# Patient Record
Sex: MALE | Race: AMERICAN INDIAN OR ALASKA NATIVE | NOT HISPANIC OR LATINO | URBAN - METROPOLITAN AREA
[De-identification: names, ages, dates, MRNs, and addresses within clinical notes are randomized per-mention and may not be internally consistent; named-entity substitution may affect disease eponyms.]

---

## 2017-11-29 ENCOUNTER — OUTPATIENT (OUTPATIENT)
Dept: OUTPATIENT SERVICES | Facility: HOSPITAL | Age: 47
LOS: 1 days | Discharge: HOME | End: 2017-11-29

## 2017-11-29 DIAGNOSIS — B20 HUMAN IMMUNODEFICIENCY VIRUS [HIV] DISEASE: ICD-10-CM

## 2018-02-28 ENCOUNTER — OUTPATIENT (OUTPATIENT)
Dept: OUTPATIENT SERVICES | Facility: HOSPITAL | Age: 48
LOS: 1 days | Discharge: HOME | End: 2018-02-28

## 2018-02-28 DIAGNOSIS — I10 ESSENTIAL (PRIMARY) HYPERTENSION: ICD-10-CM

## 2018-02-28 DIAGNOSIS — E11.9 TYPE 2 DIABETES MELLITUS WITHOUT COMPLICATIONS: ICD-10-CM

## 2018-02-28 DIAGNOSIS — Z20.6 CONTACT WITH AND (SUSPECTED) EXPOSURE TO HUMAN IMMUNODEFICIENCY VIRUS [HIV]: ICD-10-CM

## 2018-02-28 DIAGNOSIS — D50.9 IRON DEFICIENCY ANEMIA, UNSPECIFIED: ICD-10-CM

## 2018-09-19 ENCOUNTER — OUTPATIENT (OUTPATIENT)
Dept: OUTPATIENT SERVICES | Facility: HOSPITAL | Age: 48
LOS: 1 days | Discharge: HOME | End: 2018-09-19

## 2018-09-19 DIAGNOSIS — B20 HUMAN IMMUNODEFICIENCY VIRUS [HIV] DISEASE: ICD-10-CM

## 2018-10-31 ENCOUNTER — OUTPATIENT (OUTPATIENT)
Dept: OUTPATIENT SERVICES | Facility: HOSPITAL | Age: 48
LOS: 1 days | Discharge: HOME | End: 2018-10-31

## 2018-11-01 DIAGNOSIS — B20 HUMAN IMMUNODEFICIENCY VIRUS [HIV] DISEASE: ICD-10-CM

## 2019-01-10 ENCOUNTER — OUTPATIENT (OUTPATIENT)
Dept: OUTPATIENT SERVICES | Facility: HOSPITAL | Age: 49
LOS: 1 days | Discharge: HOME | End: 2019-01-10

## 2019-01-10 DIAGNOSIS — B20 HUMAN IMMUNODEFICIENCY VIRUS [HIV] DISEASE: ICD-10-CM

## 2019-02-27 PROBLEM — Z00.00 ENCOUNTER FOR PREVENTIVE HEALTH EXAMINATION: Status: ACTIVE | Noted: 2019-02-27

## 2019-02-28 ENCOUNTER — OUTPATIENT (OUTPATIENT)
Dept: OUTPATIENT SERVICES | Facility: HOSPITAL | Age: 49
LOS: 1 days | Discharge: HOME | End: 2019-02-28

## 2019-02-28 DIAGNOSIS — B20 HUMAN IMMUNODEFICIENCY VIRUS [HIV] DISEASE: ICD-10-CM

## 2019-03-29 ENCOUNTER — OUTPATIENT (OUTPATIENT)
Dept: OUTPATIENT SERVICES | Facility: HOSPITAL | Age: 49
LOS: 1 days | Discharge: HOME | End: 2019-03-29

## 2019-03-29 ENCOUNTER — APPOINTMENT (OUTPATIENT)
Dept: GASTROENTEROLOGY | Facility: CLINIC | Age: 49
End: 2019-03-29

## 2019-03-29 DIAGNOSIS — Z87.891 PERSONAL HISTORY OF NICOTINE DEPENDENCE: ICD-10-CM

## 2019-03-29 NOTE — ASSESSMENT
[FreeTextEntry1] : 49 y/o M w/ PMhx of HIV on HAART and CD 4 700, T2DM was sent by his cardiologist for chest pain with suspicion of Chronic GERD\par \par #Dysphagia to solids - possibly 2/2 Esophagitis - r/o Malignancy\par - H&H stable at 15\par - No odynophagia\par - will schedule pt for EGD\par \par #Red blood per rectum occasional w/o acute anemia\par - pt is average risk for colonoscopy\par - Pt denies any straining or constipation\par - will scheduled for colonoscopy\par \par #Transamintiis - Hepatocellular possibly medication induced 2/2 HAART, Rosuvastatn vs Janumet\par - AST 34 and ALT 58\par - T. bili and ALP wnl\par - Hepatitis panel: HAV ab positive only\par - will obtain RUQ sono\par - will order CLD workup

## 2019-03-29 NOTE — PHYSICAL EXAM
[General Appearance - Alert] : alert [General Appearance - In No Acute Distress] : in no acute distress [Auscultation Breath Sounds / Voice Sounds] : lungs were clear to auscultation bilaterally [Heart Rate And Rhythm] : heart rate was normal and rhythm regular [Heart Sounds] : normal S1 and S2 [Heart Sounds Gallop] : no gallops [Murmurs] : no murmurs [Heart Sounds Pericardial Friction Rub] : no pericardial rub [Breast Appearance] : normal in appearance [Breast Palpation Mass] : no palpable masses [Bowel Sounds] : normal bowel sounds [Abdomen Soft] : soft [Abdomen Tenderness] : non-tender [Abdomen Mass (___ Cm)] : no abdominal mass palpated [No CVA Tenderness] : no ~M costovertebral angle tenderness [No Spinal Tenderness] : no spinal tenderness [Abnormal Walk] : normal gait [Nail Clubbing] : no clubbing  or cyanosis of the fingernails [Musculoskeletal - Swelling] : no joint swelling seen [Motor Tone] : muscle strength and tone were normal [Skin Color & Pigmentation] : normal skin color and pigmentation [Skin Turgor] : normal skin turgor [] : no rash

## 2019-03-29 NOTE — HISTORY OF PRESENT ILLNESS
[de-identified] : 49 y/o M w/ PMhx of HIV on HAART and CD 4 700, T2DM was sent by his cardiologist for chest pain. Pt states that since october he has been suffering acid reflux like symptoms w/ intermittent dysphagia to solids that is resolved with consumption of liquids. He states his dysphagia has been worsening and gets a feeling of food getting stuck in his chest. 2 weeks ago he had chest pain and went to his cardiologist and underwent EKG, stress test and were unremarkable. Thus it was suspected that pt may be suffering from worsening of GERD and was referred to here. Pt currently [place] any N/V/D, fever, chills, sob, dizziness, confusion or abdominal pain.\par \par Pt also has been experiencing occasional red blood in his stool especially after eating spicy food. This happens once a month and pt does not suffer from constipation.\par \par RF: HIV on HAART, Former 30 pack year smoking hx. No illicit or IV drug use or etoh. NO family hx of stomach or colon cancer\par \par EGD/Colonoscopy: No previous egd/colonoscopy

## 2019-04-05 DIAGNOSIS — K62.5 HEMORRHAGE OF ANUS AND RECTUM: ICD-10-CM

## 2019-04-05 DIAGNOSIS — R13.10 DYSPHAGIA, UNSPECIFIED: ICD-10-CM

## 2019-04-11 LAB
ALBUMIN SERPL ELPH-MCNC: 4.7 G/DL
ALP BLD-CCNC: 53 U/L
ALT SERPL-CCNC: 38 U/L
ANA SER IF-ACNC: NEGATIVE
APTT BLD: 33.4 SEC
AST SERPL-CCNC: 23 U/L
BASOPHILS # BLD AUTO: 0.02 K/UL
BASOPHILS NFR BLD AUTO: 0.4 %
BILIRUB DIRECT SERPL-MCNC: <0.2 MG/DL
BILIRUB INDIRECT SERPL-MCNC: >0.2 MG/DL
BILIRUB SERPL-MCNC: 0.4 MG/DL
CERULOPLASMIN SERPL-MCNC: 19 MG/DL
CMV IGG SERPL QL: 3.4 U/ML
CMV IGG SERPL-IMP: POSITIVE
EBV DNA SERPL NAA+PROBE-ACNC: NOT DETECTED IU/ML
EBVPCR LOG: NOT DETECTED LOGIU/ML
EOSINOPHIL # BLD AUTO: 0.16 K/UL
EOSINOPHIL NFR BLD AUTO: 2.9 %
FERRITIN SERPL-MCNC: 494 NG/ML
HCT VFR BLD CALC: 43.5 %
HGB BLD-MCNC: 14.4 G/DL
IGA SER QL IEP: 181 MG/DL
IMM GRANULOCYTES NFR BLD AUTO: 0.4 %
INR PPP: 1.04 RATIO
IRON SATN MFR SERPL: 31 %
IRON SERPL-MCNC: 93 UG/DL
LKM AB SER QL IF: <20.1 UNITS
LYMPHOCYTES # BLD AUTO: 2.16 K/UL
LYMPHOCYTES NFR BLD AUTO: 39.3 %
MAN DIFF?: NORMAL
MCHC RBC-ENTMCNC: 28.4 PG
MCHC RBC-ENTMCNC: 33.1 G/DL
MCV RBC AUTO: 85.8 FL
MONOCYTES # BLD AUTO: 0.39 K/UL
MONOCYTES NFR BLD AUTO: 7.1 %
NEUTROPHILS # BLD AUTO: 2.74 K/UL
NEUTROPHILS NFR BLD AUTO: 49.9 %
PLATELET # BLD AUTO: 135 K/UL
PROT SERPL-MCNC: 7 G/DL
PT BLD: 12 SEC
RBC # BLD: 5.07 M/UL
RBC # FLD: 13.2 %
SMOOTH MUSCLE AB SER QL IF: NORMAL
TIBC SERPL-MCNC: 297 UG/DL
TRANSFERRIN SERPL-MCNC: 251 MG/DL
TTG IGA SER IA-ACNC: <1.2 U/ML
TTG IGA SER-ACNC: NEGATIVE
TTG IGG SER IA-ACNC: <1.2 U/ML
TTG IGG SER IA-ACNC: NEGATIVE
UIBC SERPL-MCNC: 204 UG/DL
WBC # FLD AUTO: 5.49 K/UL

## 2019-05-03 ENCOUNTER — APPOINTMENT (OUTPATIENT)
Dept: GASTROENTEROLOGY | Facility: CLINIC | Age: 49
End: 2019-05-03

## 2019-05-30 ENCOUNTER — OUTPATIENT (OUTPATIENT)
Dept: OUTPATIENT SERVICES | Facility: HOSPITAL | Age: 49
LOS: 1 days | Discharge: HOME | End: 2019-05-30

## 2019-05-30 DIAGNOSIS — B20 HUMAN IMMUNODEFICIENCY VIRUS [HIV] DISEASE: ICD-10-CM

## 2019-06-21 ENCOUNTER — TRANSCRIPTION ENCOUNTER (OUTPATIENT)
Age: 49
End: 2019-06-21

## 2019-06-21 ENCOUNTER — OUTPATIENT (OUTPATIENT)
Dept: OUTPATIENT SERVICES | Facility: HOSPITAL | Age: 49
LOS: 1 days | Discharge: HOME | End: 2019-06-21
Payer: COMMERCIAL

## 2019-06-21 ENCOUNTER — RESULT REVIEW (OUTPATIENT)
Age: 49
End: 2019-06-21

## 2019-06-21 VITALS
RESPIRATION RATE: 20 BRPM | DIASTOLIC BLOOD PRESSURE: 76 MMHG | SYSTOLIC BLOOD PRESSURE: 114 MMHG | HEIGHT: 68 IN | TEMPERATURE: 98 F | HEART RATE: 74 BPM | WEIGHT: 190.04 LBS

## 2019-06-21 VITALS — HEART RATE: 72 BPM | RESPIRATION RATE: 18 BRPM | SYSTOLIC BLOOD PRESSURE: 124 MMHG | DIASTOLIC BLOOD PRESSURE: 76 MMHG

## 2019-06-21 DIAGNOSIS — Z90.49 ACQUIRED ABSENCE OF OTHER SPECIFIED PARTS OF DIGESTIVE TRACT: Chronic | ICD-10-CM

## 2019-06-21 DIAGNOSIS — I80.219 PHLEBITIS AND THROMBOPHLEBITIS OF UNSPECIFIED ILIAC VEIN: ICD-10-CM

## 2019-06-21 PROCEDURE — 88312 SPECIAL STAINS GROUP 1: CPT | Mod: 26

## 2019-06-21 PROCEDURE — 88305 TISSUE EXAM BY PATHOLOGIST: CPT | Mod: 26

## 2019-06-21 NOTE — CHART NOTE - NSCHARTNOTEFT_GEN_A_CORE
PACU ANESTHESIA ADMISSION NOTE      Procedure:   Post op diagnosis:      ____  Intubated  TV:______       Rate: ______      FiO2: ______    _x___  Patent Airway    _x___  Full return of protective reflexes    _x___  Full recovery from anesthesia / back to baseline status    Vitals:            T:                BP :      139/91          R:   20           Sat:   100            P:69      Mental Status:  _x___ Awake   _____ Alert   _____ Drowsy   _____ Sedated    Nausea/Vomiting:  _x___  NO       ______Yes,   See Post - Op Orders         Pain Scale (0-10):  __0___    Treatment: _x___ None    ____ See Post - Op/PCA Orders    Post - Operative Fluids:   __x__ Oral   ____ See Post - Op Orders    Plan: Discharge:   _x___Home       _____Floor     _____Critical Care    _____  Other:_________________    Comments:  No anesthesia issues or complications noted.  Discharge when criteria met.

## 2019-06-24 LAB
SURGICAL PATHOLOGY STUDY: SIGNIFICANT CHANGE UP
SURGICAL PATHOLOGY STUDY: SIGNIFICANT CHANGE UP

## 2019-06-26 DIAGNOSIS — K52.9 NONINFECTIVE GASTROENTERITIS AND COLITIS, UNSPECIFIED: ICD-10-CM

## 2019-06-26 DIAGNOSIS — K63.5 POLYP OF COLON: ICD-10-CM

## 2019-06-26 DIAGNOSIS — K29.50 UNSPECIFIED CHRONIC GASTRITIS WITHOUT BLEEDING: ICD-10-CM

## 2019-06-26 DIAGNOSIS — K64.8 OTHER HEMORRHOIDS: ICD-10-CM

## 2019-06-26 DIAGNOSIS — K22.70 BARRETT'S ESOPHAGUS WITHOUT DYSPLASIA: ICD-10-CM

## 2019-06-26 DIAGNOSIS — Z12.11 ENCOUNTER FOR SCREENING FOR MALIGNANT NEOPLASM OF COLON: ICD-10-CM

## 2019-06-26 DIAGNOSIS — K57.30 DIVERTICULOSIS OF LARGE INTESTINE WITHOUT PERFORATION OR ABSCESS WITHOUT BLEEDING: ICD-10-CM

## 2019-06-26 DIAGNOSIS — D12.8 BENIGN NEOPLASM OF RECTUM: ICD-10-CM

## 2019-09-01 ENCOUNTER — INPATIENT (INPATIENT)
Facility: HOSPITAL | Age: 49
LOS: 15 days | Discharge: HOME HEALTH | End: 2019-09-17
Attending: THORACIC SURGERY (CARDIOTHORACIC VASCULAR SURGERY) | Admitting: THORACIC SURGERY (CARDIOTHORACIC VASCULAR SURGERY)
Payer: COMMERCIAL

## 2019-09-01 VITALS
HEART RATE: 118 BPM | RESPIRATION RATE: 20 BRPM | WEIGHT: 195.11 LBS | SYSTOLIC BLOOD PRESSURE: 126 MMHG | DIASTOLIC BLOOD PRESSURE: 74 MMHG | OXYGEN SATURATION: 97 % | TEMPERATURE: 103 F

## 2019-09-01 DIAGNOSIS — Z90.49 ACQUIRED ABSENCE OF OTHER SPECIFIED PARTS OF DIGESTIVE TRACT: Chronic | ICD-10-CM

## 2019-09-01 LAB
ALBUMIN SERPL ELPH-MCNC: 4.9 G/DL — SIGNIFICANT CHANGE UP (ref 3.5–5.2)
ALP SERPL-CCNC: 58 U/L — SIGNIFICANT CHANGE UP (ref 30–115)
ALT FLD-CCNC: 26 U/L — SIGNIFICANT CHANGE UP (ref 0–41)
ANION GAP SERPL CALC-SCNC: 15 MMOL/L — HIGH (ref 7–14)
APTT BLD: 31.9 SEC — SIGNIFICANT CHANGE UP (ref 27–39.2)
AST SERPL-CCNC: 33 U/L — SIGNIFICANT CHANGE UP (ref 0–41)
BASOPHILS # BLD AUTO: 0.02 K/UL — SIGNIFICANT CHANGE UP (ref 0–0.2)
BASOPHILS NFR BLD AUTO: 0.2 % — SIGNIFICANT CHANGE UP (ref 0–1)
BILIRUB SERPL-MCNC: 0.9 MG/DL — SIGNIFICANT CHANGE UP (ref 0.2–1.2)
BUN SERPL-MCNC: 12 MG/DL — SIGNIFICANT CHANGE UP (ref 10–20)
CALCIUM SERPL-MCNC: 9.5 MG/DL — SIGNIFICANT CHANGE UP (ref 8.5–10.1)
CHLORIDE SERPL-SCNC: 96 MMOL/L — LOW (ref 98–110)
CO2 SERPL-SCNC: 25 MMOL/L — SIGNIFICANT CHANGE UP (ref 17–32)
CREAT SERPL-MCNC: 1 MG/DL — SIGNIFICANT CHANGE UP (ref 0.7–1.5)
EOSINOPHIL # BLD AUTO: 0.01 K/UL — SIGNIFICANT CHANGE UP (ref 0–0.7)
EOSINOPHIL NFR BLD AUTO: 0.1 % — SIGNIFICANT CHANGE UP (ref 0–8)
GLUCOSE SERPL-MCNC: 159 MG/DL — HIGH (ref 70–99)
HCT VFR BLD CALC: 45.6 % — SIGNIFICANT CHANGE UP (ref 42–52)
HGB BLD-MCNC: 15.5 G/DL — SIGNIFICANT CHANGE UP (ref 14–18)
IMM GRANULOCYTES NFR BLD AUTO: 0.2 % — SIGNIFICANT CHANGE UP (ref 0.1–0.3)
INR BLD: 1.21 RATIO — SIGNIFICANT CHANGE UP (ref 0.65–1.3)
LACTATE SERPL-SCNC: 1.7 MMOL/L — SIGNIFICANT CHANGE UP (ref 0.5–2.2)
LYMPHOCYTES # BLD AUTO: 0.47 K/UL — LOW (ref 1.2–3.4)
LYMPHOCYTES # BLD AUTO: 5.4 % — LOW (ref 20.5–51.1)
MCHC RBC-ENTMCNC: 29.2 PG — SIGNIFICANT CHANGE UP (ref 27–31)
MCHC RBC-ENTMCNC: 34 G/DL — SIGNIFICANT CHANGE UP (ref 32–37)
MCV RBC AUTO: 85.9 FL — SIGNIFICANT CHANGE UP (ref 80–94)
MONOCYTES # BLD AUTO: 0.52 K/UL — SIGNIFICANT CHANGE UP (ref 0.1–0.6)
MONOCYTES NFR BLD AUTO: 6 % — SIGNIFICANT CHANGE UP (ref 1.7–9.3)
NEUTROPHILS # BLD AUTO: 7.63 K/UL — HIGH (ref 1.4–6.5)
NEUTROPHILS NFR BLD AUTO: 88.1 % — HIGH (ref 42.2–75.2)
NRBC # BLD: 0 /100 WBCS — SIGNIFICANT CHANGE UP (ref 0–0)
PLATELET # BLD AUTO: 104 K/UL — LOW (ref 130–400)
POTASSIUM SERPL-MCNC: 4.1 MMOL/L — SIGNIFICANT CHANGE UP (ref 3.5–5)
POTASSIUM SERPL-SCNC: 4.1 MMOL/L — SIGNIFICANT CHANGE UP (ref 3.5–5)
PROT SERPL-MCNC: 8 G/DL — SIGNIFICANT CHANGE UP (ref 6–8)
PROTHROM AB SERPL-ACNC: 13.9 SEC — HIGH (ref 9.95–12.87)
RBC # BLD: 5.31 M/UL — SIGNIFICANT CHANGE UP (ref 4.7–6.1)
RBC # FLD: 12.6 % — SIGNIFICANT CHANGE UP (ref 11.5–14.5)
SODIUM SERPL-SCNC: 136 MMOL/L — SIGNIFICANT CHANGE UP (ref 135–146)
WBC # BLD: 8.67 K/UL — SIGNIFICANT CHANGE UP (ref 4.8–10.8)
WBC # FLD AUTO: 8.67 K/UL — SIGNIFICANT CHANGE UP (ref 4.8–10.8)

## 2019-09-01 PROCEDURE — 70491 CT SOFT TISSUE NECK W/DYE: CPT | Mod: 26

## 2019-09-01 PROCEDURE — 71046 X-RAY EXAM CHEST 2 VIEWS: CPT | Mod: 26

## 2019-09-01 PROCEDURE — 99285 EMERGENCY DEPT VISIT HI MDM: CPT

## 2019-09-01 RX ORDER — SODIUM CHLORIDE 9 MG/ML
2000 INJECTION INTRAMUSCULAR; INTRAVENOUS; SUBCUTANEOUS ONCE
Refills: 0 | Status: COMPLETED | OUTPATIENT
Start: 2019-09-01 | End: 2019-09-01

## 2019-09-01 RX ORDER — SODIUM CHLORIDE 9 MG/ML
700 INJECTION INTRAMUSCULAR; INTRAVENOUS; SUBCUTANEOUS ONCE
Refills: 0 | Status: COMPLETED | OUTPATIENT
Start: 2019-09-01 | End: 2019-09-01

## 2019-09-01 RX ORDER — VANCOMYCIN HCL 1 G
1500 VIAL (EA) INTRAVENOUS ONCE
Refills: 0 | Status: COMPLETED | OUTPATIENT
Start: 2019-09-01 | End: 2019-09-01

## 2019-09-01 RX ORDER — ACETAMINOPHEN 500 MG
650 TABLET ORAL ONCE
Refills: 0 | Status: COMPLETED | OUTPATIENT
Start: 2019-09-01 | End: 2019-09-01

## 2019-09-01 RX ORDER — AMPICILLIN SODIUM AND SULBACTAM SODIUM 250; 125 MG/ML; MG/ML
3 INJECTION, POWDER, FOR SUSPENSION INTRAMUSCULAR; INTRAVENOUS ONCE
Refills: 0 | Status: COMPLETED | OUTPATIENT
Start: 2019-09-01 | End: 2019-09-01

## 2019-09-01 RX ORDER — IBUPROFEN 200 MG
600 TABLET ORAL ONCE
Refills: 0 | Status: COMPLETED | OUTPATIENT
Start: 2019-09-01 | End: 2019-09-01

## 2019-09-01 RX ADMIN — AMPICILLIN SODIUM AND SULBACTAM SODIUM 200 GRAM(S): 250; 125 INJECTION, POWDER, FOR SUSPENSION INTRAMUSCULAR; INTRAVENOUS at 21:17

## 2019-09-01 RX ADMIN — SODIUM CHLORIDE 2000 MILLILITER(S): 9 INJECTION INTRAMUSCULAR; INTRAVENOUS; SUBCUTANEOUS at 19:01

## 2019-09-01 RX ADMIN — Medication 650 MILLIGRAM(S): at 21:17

## 2019-09-01 RX ADMIN — SODIUM CHLORIDE 700 MILLILITER(S): 9 INJECTION INTRAMUSCULAR; INTRAVENOUS; SUBCUTANEOUS at 21:16

## 2019-09-01 RX ADMIN — Medication 600 MILLIGRAM(S): at 21:00

## 2019-09-01 RX ADMIN — Medication 600 MILLIGRAM(S): at 19:01

## 2019-09-01 RX ADMIN — Medication 300 MILLIGRAM(S): at 22:50

## 2019-09-01 RX ADMIN — Medication 650 MILLIGRAM(S): at 23:19

## 2019-09-01 NOTE — CONSULT NOTE ADULT - SUBJECTIVE AND OBJECTIVE BOX
Patient is a 48y old  Male who presents with a chief complaint of lower left side tooth pain    HPI: Patient c/o left tooth pain for 1 week, Has appt for wisdom tooth extraction this week, Fever up to 105 with worsening pain since last night, Took motrin 800mg 10 am and tylenol 500mg 3 pm, No HA, No neck pain,  no cough, no SOB. H/o DM, HIV with 0 viral load and normal CD4/,      PAST MEDICAL & SURGICAL HISTORY:  Diabetes  Hypercholesteremia  HIV (human immunodeficiency virus infection)  History of appendectomy    (  - ) heart valve replacement  (  - ) joint replacement  ( -  ) pregnancy    MEDICATIONS  (STANDING):  vancomycin  IVPB 1500 milliGRAM(s) IV Intermittent once    MEDICATIONS  (PRN):      Allergies    No Known Allergies    Intolerances        FAMILY HISTORY:      *SOCIAL HISTORY: (-   ) Tobacco; (-   ) ETOH    *Last Dental Visit:    Vital Signs Last 24 Hrs  T(C): 37.7 (01 Sep 2019 21:10), Max: 39.6 (01 Sep 2019 18:09)  T(F): 99.9 (01 Sep 2019 21:10), Max: 103.3 (01 Sep 2019 18:09)  HR: 118 (01 Sep 2019 18:09) (118 - 118)  BP: 126/74 (01 Sep 2019 18:09) (126/74 - 126/74)  BP(mean): --  RR: 20 (01 Sep 2019 18:09) (20 - 20)  SpO2: 97% (01 Sep 2019 18:09) (97% - 97%)    LABS:                        15.5   8.67  )-----------( 104      ( 01 Sep 2019 18:50 )             45.6     09-01    136  |  96<L>  |  12  ----------------------------<  159<H>  4.1   |  25  |  1.0    Ca    9.5      01 Sep 2019 18:50    TPro  8.0  /  Alb  4.9  /  TBili  0.9  /  DBili  x   /  AST  33  /  ALT  26  /  AlkPhos  58  09-01    WBC Count: 8.67 K/uL [4.80 - 10.80] (09-01 @ 18:50)  Platelet Count - Automated: 104 K/uL <L> [130 - 400] (09-01 @ 18:50)  INR: 1.21 ratio [0.65 - 1.30] (09-01 @ 18:50)        PT/INR - ( 01 Sep 2019 18:50 )   PT: 13.90 sec;   INR: 1.21 ratio         PTT - ( 01 Sep 2019 18:50 )  PTT:31.9 sec  EOE:  TMJ ( -  ) clicks                     (  - ) pops                     (  - ) crepitus             Mandible <<FROM>>             Facial bones and MOM <<grossly intact>>             ( +  ) trismus (around 20-25mm opening)             ( -  ) lymphadenopathy             (  + ) swelling (mild swelling on the left side of neck)             (  - ) asymmetry             ( +  ) palpation             ( -  ) dyspnea             (  - ) dysphagia             (  - ) loss of consciousness    IOE:  <<permanent> dentition: <<grossly intact>           hard/soft palate:  (  - ) palatal torus, <<No pathology noted>>           tongue/FOM <<No pathology noted>>           labial/buccal mucosa <<No pathology noted>>           ( +  ) percussion           (+   ) palpation           ( -  ) swelling            ( -  ) abscess           (  - ) sinus tract      *DENTAL RADIOGRAPHS: N/A    RADIOLOGY & ADDITIONAL STUDIES:    *ASSESSMENT:    Clinically,    EOE: slight swelling on the left neck, tenderness when palpated  IOE: no signs of swelling. Pain worsening from 19 -> 18 -> 17 when palpated. Gingiva inflamed on the distal of tooth #17    Primary diagnosis: Pericoronitis    *PLAN:    IV antibiotic and peridex oral rinse.    Extraction of tooth #17 on Tuesday    PROCEDURE:   Verbal and written consent given.  Anesthesia: << N/A   >>   Treatment: <<  N/A  >>     RECOMMENDATIONS:  1) << soft diet, IV antibiotic, Peridex oral rinse  >>  2) Dental F/U with [facility] on Tuesday.  3) If any difficulty swallowing/breathing, fever occur, call ER    Dr. Nolberto Page DDS, pager #[phone]

## 2019-09-01 NOTE — ED PROVIDER NOTE - CLINICAL SUMMARY MEDICAL DECISION MAKING FREE TEXT BOX
pt seen for neck pain x 5 days with fever x 2 days, s/p dental procedure, labs unremarkable and CT ST neck without signs abscess. pt with sepsis workup and broad spectrum abx given T max 105 and admitted for further workup fever and neck swelling/tenderness.

## 2019-09-01 NOTE — ED PROVIDER NOTE - PROGRESS NOTE DETAILS
dental at beside Dr. Page ELIESEREVY: dental at beside Dr. Page KYLE: unable to reach Dr. Paulino, paged [phone], will admit to his service as this is an HIV patient. discussed with hospitalist and she agrees that admission should go to Dr. Paulino. disucssed with MAR. ordered EKG to ensure sinus tach. called code STEMI. Dr. Ken @ bedside. MAR aware. will draw trop now. code STEMI cancelled by Dr. Ken. pt still denies CP. pt relates has had an "abnormal EKG in the past", but it is unclear if he has had these changes before. requests repeat EKG in 30 minutes which she relayed to medical resident to eval for dynamic changes. recommends to keep on medicine floor for now, and if changes on EKG can be upgraded as needed. MAR aware of all. KYLE: called code STEMI. Dr. Ken @ bedside. MAR aware. will draw trop now. MLEVY: code STEMI cancelled by Dr. Ken. pt still denies CP. pt relates has had an "abnormal EKG in the past", but it is unclear if he has had these changes before. requests repeat EKG in 30 minutes which she relayed to medical resident to eval for dynamic changes. recommends to keep on medicine floor for now, and if changes on EKG can be upgraded as needed. MAR aware of all.

## 2019-09-01 NOTE — ED PROVIDER NOTE - OBJECTIVE STATEMENT
Patient c/o left tooth pain for 1 week, Has appt for wisdom tooth extraction this week, Fever up to 105 with worsening pain since last night, Took motrin 800mg 10 am and tylenol 500mg 3 pm, No HA, No neck pain,  no cough, no SOB. H/o DM, HIV with 0 viral load and normal CD4/,

## 2019-09-01 NOTE — ED ADULT NURSE REASSESSMENT NOTE - NS ED NURSE REASSESS COMMENT FT1
pt received from urgent care for further work up and admission. medications pending from pharmacy, pt is aaox3, nad, respirations easy and regular, skin is warm, dry, and normal in color, NS infusing, will recheck temp and given medications when they arrive

## 2019-09-01 NOTE — ED ADULT NURSE NOTE - OBJECTIVE STATEMENT
Pt presents to ED c/o left lower wisdom tooth pain x1 day; pt currently has a fever. Denies nausea/vomiting.

## 2019-09-02 PROBLEM — E78.00 PURE HYPERCHOLESTEROLEMIA, UNSPECIFIED: Chronic | Status: ACTIVE | Noted: 2019-06-21

## 2019-09-02 PROBLEM — B20 HUMAN IMMUNODEFICIENCY VIRUS [HIV] DISEASE: Chronic | Status: ACTIVE | Noted: 2019-06-21

## 2019-09-02 PROBLEM — E11.9 TYPE 2 DIABETES MELLITUS WITHOUT COMPLICATIONS: Chronic | Status: ACTIVE | Noted: 2019-06-21

## 2019-09-02 LAB
ALBUMIN SERPL ELPH-MCNC: 4.3 G/DL — SIGNIFICANT CHANGE UP (ref 3.5–5.2)
ALP SERPL-CCNC: 49 U/L — SIGNIFICANT CHANGE UP (ref 30–115)
ALT FLD-CCNC: 26 U/L — SIGNIFICANT CHANGE UP (ref 0–41)
ANION GAP SERPL CALC-SCNC: 11 MMOL/L — SIGNIFICANT CHANGE UP (ref 7–14)
APTT BLD: 39.2 SEC — SIGNIFICANT CHANGE UP (ref 27–39.2)
APTT BLD: 55.5 SEC — HIGH (ref 27–39.2)
AST SERPL-CCNC: 45 U/L — HIGH (ref 0–41)
BASOPHILS # BLD AUTO: 0.02 K/UL — SIGNIFICANT CHANGE UP (ref 0–0.2)
BASOPHILS NFR BLD AUTO: 0.3 % — SIGNIFICANT CHANGE UP (ref 0–1)
BILIRUB SERPL-MCNC: 0.7 MG/DL — SIGNIFICANT CHANGE UP (ref 0.2–1.2)
BUN SERPL-MCNC: 9 MG/DL — LOW (ref 10–20)
CALCIUM SERPL-MCNC: 8.6 MG/DL — SIGNIFICANT CHANGE UP (ref 8.5–10.1)
CHLORIDE SERPL-SCNC: 101 MMOL/L — SIGNIFICANT CHANGE UP (ref 98–110)
CK MB CFR SERPL CALC: 26.5 NG/ML — HIGH (ref 0.6–6.3)
CK SERPL-CCNC: 495 U/L — HIGH (ref 0–225)
CO2 SERPL-SCNC: 23 MMOL/L — SIGNIFICANT CHANGE UP (ref 17–32)
CREAT SERPL-MCNC: 0.8 MG/DL — SIGNIFICANT CHANGE UP (ref 0.7–1.5)
EOSINOPHIL # BLD AUTO: 0.03 K/UL — SIGNIFICANT CHANGE UP (ref 0–0.7)
EOSINOPHIL NFR BLD AUTO: 0.4 % — SIGNIFICANT CHANGE UP (ref 0–8)
GLUCOSE BLDC GLUCOMTR-MCNC: 144 MG/DL — HIGH (ref 70–99)
GLUCOSE SERPL-MCNC: 157 MG/DL — HIGH (ref 70–99)
HCT VFR BLD CALC: 39.1 % — LOW (ref 42–52)
HGB BLD-MCNC: 13.1 G/DL — LOW (ref 14–18)
IMM GRANULOCYTES NFR BLD AUTO: 0.4 % — HIGH (ref 0.1–0.3)
INR BLD: 1.33 RATIO — HIGH (ref 0.65–1.3)
LYMPHOCYTES # BLD AUTO: 0.61 K/UL — LOW (ref 1.2–3.4)
LYMPHOCYTES # BLD AUTO: 9.1 % — LOW (ref 20.5–51.1)
MAGNESIUM SERPL-MCNC: 1.8 MG/DL — SIGNIFICANT CHANGE UP (ref 1.8–2.4)
MCHC RBC-ENTMCNC: 28.9 PG — SIGNIFICANT CHANGE UP (ref 27–31)
MCHC RBC-ENTMCNC: 33.5 G/DL — SIGNIFICANT CHANGE UP (ref 32–37)
MCV RBC AUTO: 86.1 FL — SIGNIFICANT CHANGE UP (ref 80–94)
MONOCYTES # BLD AUTO: 0.43 K/UL — SIGNIFICANT CHANGE UP (ref 0.1–0.6)
MONOCYTES NFR BLD AUTO: 6.4 % — SIGNIFICANT CHANGE UP (ref 1.7–9.3)
NEUTROPHILS # BLD AUTO: 5.56 K/UL — SIGNIFICANT CHANGE UP (ref 1.4–6.5)
NEUTROPHILS NFR BLD AUTO: 83.4 % — HIGH (ref 42.2–75.2)
NRBC # BLD: 0 /100 WBCS — SIGNIFICANT CHANGE UP (ref 0–0)
PLATELET # BLD AUTO: 95 K/UL — LOW (ref 130–400)
POTASSIUM SERPL-MCNC: 3.7 MMOL/L — SIGNIFICANT CHANGE UP (ref 3.5–5)
POTASSIUM SERPL-SCNC: 3.7 MMOL/L — SIGNIFICANT CHANGE UP (ref 3.5–5)
PROT SERPL-MCNC: 6.7 G/DL — SIGNIFICANT CHANGE UP (ref 6–8)
PROTHROM AB SERPL-ACNC: 15.3 SEC — HIGH (ref 9.95–12.87)
RBC # BLD: 4.54 M/UL — LOW (ref 4.7–6.1)
RBC # FLD: 12.7 % — SIGNIFICANT CHANGE UP (ref 11.5–14.5)
SODIUM SERPL-SCNC: 135 MMOL/L — SIGNIFICANT CHANGE UP (ref 135–146)
TROPONIN T SERPL-MCNC: 0.45 NG/ML — CRITICAL HIGH
TROPONIN T SERPL-MCNC: 0.49 NG/ML — CRITICAL HIGH
WBC # BLD: 6.68 K/UL — SIGNIFICANT CHANGE UP (ref 4.8–10.8)
WBC # FLD AUTO: 6.68 K/UL — SIGNIFICANT CHANGE UP (ref 4.8–10.8)

## 2019-09-02 PROCEDURE — 93010 ELECTROCARDIOGRAM REPORT: CPT | Mod: 77

## 2019-09-02 PROCEDURE — 93306 TTE W/DOPPLER COMPLETE: CPT | Mod: 26

## 2019-09-02 PROCEDURE — 93010 ELECTROCARDIOGRAM REPORT: CPT | Mod: 77,76

## 2019-09-02 RX ORDER — CLOPIDOGREL BISULFATE 75 MG/1
75 TABLET, FILM COATED ORAL DAILY
Refills: 0 | Status: DISCONTINUED | OUTPATIENT
Start: 2019-09-03 | End: 2019-09-05

## 2019-09-02 RX ORDER — AMOXICILLIN 250 MG/5ML
500 SUSPENSION, RECONSTITUTED, ORAL (ML) ORAL THREE TIMES A DAY
Refills: 0 | Status: DISCONTINUED | OUTPATIENT
Start: 2019-09-02 | End: 2019-09-02

## 2019-09-02 RX ORDER — CLOPIDOGREL BISULFATE 75 MG/1
300 TABLET, FILM COATED ORAL ONCE
Refills: 0 | Status: COMPLETED | OUTPATIENT
Start: 2019-09-02 | End: 2019-09-02

## 2019-09-02 RX ORDER — METOPROLOL TARTRATE 50 MG
25 TABLET ORAL
Refills: 0 | Status: DISCONTINUED | OUTPATIENT
Start: 2019-09-02 | End: 2019-09-09

## 2019-09-02 RX ORDER — CHLORHEXIDINE GLUCONATE 213 G/1000ML
1 SOLUTION TOPICAL
Refills: 0 | Status: DISCONTINUED | OUTPATIENT
Start: 2019-09-02 | End: 2019-09-10

## 2019-09-02 RX ORDER — HEPARIN SODIUM 5000 [USP'U]/ML
900 INJECTION INTRAVENOUS; SUBCUTANEOUS
Qty: 25000 | Refills: 0 | Status: DISCONTINUED | OUTPATIENT
Start: 2019-09-02 | End: 2019-09-02

## 2019-09-02 RX ORDER — TRAMADOL HYDROCHLORIDE 50 MG/1
50 TABLET ORAL THREE TIMES A DAY
Refills: 0 | Status: DISCONTINUED | OUTPATIENT
Start: 2019-09-02 | End: 2019-09-06

## 2019-09-02 RX ORDER — CHLORHEXIDINE GLUCONATE 213 G/1000ML
15 SOLUTION TOPICAL
Refills: 0 | Status: DISCONTINUED | OUTPATIENT
Start: 2019-09-02 | End: 2019-09-02

## 2019-09-02 RX ORDER — AMPICILLIN SODIUM AND SULBACTAM SODIUM 250; 125 MG/ML; MG/ML
INJECTION, POWDER, FOR SUSPENSION INTRAMUSCULAR; INTRAVENOUS
Refills: 0 | Status: DISCONTINUED | OUTPATIENT
Start: 2019-09-03 | End: 2019-09-06

## 2019-09-02 RX ORDER — ACETAMINOPHEN 500 MG
650 TABLET ORAL EVERY 6 HOURS
Refills: 0 | Status: DISCONTINUED | OUTPATIENT
Start: 2019-09-02 | End: 2019-09-10

## 2019-09-02 RX ORDER — PANTOPRAZOLE SODIUM 20 MG/1
40 TABLET, DELAYED RELEASE ORAL
Refills: 0 | Status: DISCONTINUED | OUTPATIENT
Start: 2019-09-02 | End: 2019-09-10

## 2019-09-02 RX ORDER — AMPICILLIN SODIUM AND SULBACTAM SODIUM 250; 125 MG/ML; MG/ML
3 INJECTION, POWDER, FOR SUSPENSION INTRAMUSCULAR; INTRAVENOUS EVERY 6 HOURS
Refills: 0 | Status: DISCONTINUED | OUTPATIENT
Start: 2019-09-02 | End: 2019-09-02

## 2019-09-02 RX ORDER — ASPIRIN/CALCIUM CARB/MAGNESIUM 324 MG
325 TABLET ORAL ONCE
Refills: 0 | Status: COMPLETED | OUTPATIENT
Start: 2019-09-02 | End: 2019-09-02

## 2019-09-02 RX ORDER — SITAGLIPTIN AND METFORMIN HYDROCHLORIDE 500; 50 MG/1; MG/1
1 TABLET, FILM COATED ORAL
Qty: 0 | Refills: 0 | DISCHARGE

## 2019-09-02 RX ORDER — METOPROLOL TARTRATE 50 MG
25 TABLET ORAL ONCE
Refills: 0 | Status: COMPLETED | OUTPATIENT
Start: 2019-09-02 | End: 2019-09-02

## 2019-09-02 RX ORDER — HEPARIN SODIUM 5000 [USP'U]/ML
1150 INJECTION INTRAVENOUS; SUBCUTANEOUS
Qty: 25000 | Refills: 0 | Status: DISCONTINUED | OUTPATIENT
Start: 2019-09-02 | End: 2019-09-03

## 2019-09-02 RX ORDER — ATORVASTATIN CALCIUM 80 MG/1
40 TABLET, FILM COATED ORAL AT BEDTIME
Refills: 0 | Status: DISCONTINUED | OUTPATIENT
Start: 2019-09-02 | End: 2019-09-10

## 2019-09-02 RX ORDER — ASPIRIN/CALCIUM CARB/MAGNESIUM 324 MG
81 TABLET ORAL DAILY
Refills: 0 | Status: DISCONTINUED | OUTPATIENT
Start: 2019-09-02 | End: 2019-09-10

## 2019-09-02 RX ORDER — AMPICILLIN SODIUM AND SULBACTAM SODIUM 250; 125 MG/ML; MG/ML
3 INJECTION, POWDER, FOR SUSPENSION INTRAMUSCULAR; INTRAVENOUS EVERY 6 HOURS
Refills: 0 | Status: DISCONTINUED | OUTPATIENT
Start: 2019-09-03 | End: 2019-09-06

## 2019-09-02 RX ORDER — AMPICILLIN SODIUM AND SULBACTAM SODIUM 250; 125 MG/ML; MG/ML
3 INJECTION, POWDER, FOR SUSPENSION INTRAMUSCULAR; INTRAVENOUS ONCE
Refills: 0 | Status: COMPLETED | OUTPATIENT
Start: 2019-09-02 | End: 2019-09-03

## 2019-09-02 RX ORDER — HEPARIN SODIUM 5000 [USP'U]/ML
4100 INJECTION INTRAVENOUS; SUBCUTANEOUS ONCE
Refills: 0 | Status: COMPLETED | OUTPATIENT
Start: 2019-09-02 | End: 2019-09-02

## 2019-09-02 RX ADMIN — HEPARIN SODIUM 9 UNIT(S)/HR: 5000 INJECTION INTRAVENOUS; SUBCUTANEOUS at 02:46

## 2019-09-02 RX ADMIN — Medication 650 MILLIGRAM(S): at 20:31

## 2019-09-02 RX ADMIN — AMPICILLIN SODIUM AND SULBACTAM SODIUM 200 GRAM(S): 250; 125 INJECTION, POWDER, FOR SUSPENSION INTRAMUSCULAR; INTRAVENOUS at 06:22

## 2019-09-02 RX ADMIN — CHLORHEXIDINE GLUCONATE 15 MILLILITER(S): 213 SOLUTION TOPICAL at 17:44

## 2019-09-02 RX ADMIN — Medication 650 MILLIGRAM(S): at 18:54

## 2019-09-02 RX ADMIN — HEPARIN SODIUM 4100 UNIT(S): 5000 INJECTION INTRAVENOUS; SUBCUTANEOUS at 02:45

## 2019-09-02 RX ADMIN — HEPARIN SODIUM 11.5 UNIT(S)/HR: 5000 INJECTION INTRAVENOUS; SUBCUTANEOUS at 20:00

## 2019-09-02 RX ADMIN — AMPICILLIN SODIUM AND SULBACTAM SODIUM 200 GRAM(S): 250; 125 INJECTION, POWDER, FOR SUSPENSION INTRAMUSCULAR; INTRAVENOUS at 17:45

## 2019-09-02 RX ADMIN — Medication 500 MILLIGRAM(S): at 21:52

## 2019-09-02 RX ADMIN — Medication 25 MILLIGRAM(S): at 02:58

## 2019-09-02 RX ADMIN — AMPICILLIN SODIUM AND SULBACTAM SODIUM 200 GRAM(S): 250; 125 INJECTION, POWDER, FOR SUSPENSION INTRAMUSCULAR; INTRAVENOUS at 12:30

## 2019-09-02 RX ADMIN — CLOPIDOGREL BISULFATE 300 MILLIGRAM(S): 75 TABLET, FILM COATED ORAL at 02:45

## 2019-09-02 RX ADMIN — Medication 81 MILLIGRAM(S): at 11:39

## 2019-09-02 RX ADMIN — CHLORHEXIDINE GLUCONATE 15 MILLILITER(S): 213 SOLUTION TOPICAL at 06:22

## 2019-09-02 RX ADMIN — Medication 650 MILLIGRAM(S): at 04:01

## 2019-09-02 RX ADMIN — Medication 325 MILLIGRAM(S): at 02:45

## 2019-09-02 RX ADMIN — Medication 25 MILLIGRAM(S): at 05:47

## 2019-09-02 RX ADMIN — Medication 650 MILLIGRAM(S): at 12:31

## 2019-09-02 RX ADMIN — ATORVASTATIN CALCIUM 40 MILLIGRAM(S): 80 TABLET, FILM COATED ORAL at 21:52

## 2019-09-02 RX ADMIN — Medication 25 MILLIGRAM(S): at 17:44

## 2019-09-02 NOTE — CONSULT NOTE ADULT - ASSESSMENT
Assessment:  Elevated cardiac enzymes  Fever rule out sources of infection  Pt denies every having chest pain    Plan:  start aspirin, load with plavix  heparin bolus and drip  upgrade to CCU  check 2D Echo  trend cardiac enzymes Assessment:  Elevated cardiac enzymes  Fever rule out sources of infection  Pt denies every having chest pain    Plan:  start aspirin, load with plavix  heparin bolus and drip  upgrade to CCU  check 2D Echo  trend cardiac enzymes  sepsis work up, obtain blood culture

## 2019-09-02 NOTE — PROGRESS NOTE ADULT - SUBJECTIVE AND OBJECTIVE BOX
Patient is a 48y old  Male who presents with a chief complaint of Fevers and left-sided tooth/facial pain (02 Sep 2019 12:31)        REVIEW OF SYSTEMS: All other review systems are negative      ICU Vital Signs Last 24 Hrs  T(C): 37.7 (02 Sep 2019 20:00), Max: 38.7 (02 Sep 2019 18:00)  T(F): 99.8 (02 Sep 2019 20:00), Max: 101.6 (02 Sep 2019 18:00)  HR: 84 (02 Sep 2019 22:00) (83 - 103)  BP: 104/63 (02 Sep 2019 22:00) (101/59 - 158/92)  BP(mean): 83 (02 Sep 2019 22:00) (75 - 118)  ABP: --  ABP(mean): --  RR: 22 (02 Sep 2019 22:00) (16 - 22)  SpO2: 99% (02 Sep 2019 22:00) (95% - 100%)        PHYSICAL EXAM:  GENERAL: Not in distress  HEENT:   CHEST/LUNG: Clear to percussion bilaterally  HEART: s1 and s2 present   ABDOMEN: Soft, Nontender, Nondistended; Bowel sounds present  EXTREMITIES:  2+ Peripheral Pulses, No clubbing, cyanosis, or edema  CNS: Awake and Alert      MEDICATIONS  (STANDING):  abacavir 600 mG/dolutegravir 50 mG/lamiVUDine 300 mG 1 Tablet(s) Oral daily  amoxicillin 500 milliGRAM(s) Oral three times a day  aspirin enteric coated 81 milliGRAM(s) Oral daily  atorvastatin 40 milliGRAM(s) Oral at bedtime  chlorhexidine 4% Liquid 1 Application(s) Topical <User Schedule>  heparin  Infusion 1150 Unit(s)/Hr (11.5 mL/Hr) IV Continuous <Continuous>  metoprolol tartrate 25 milliGRAM(s) Oral two times a day  pantoprazole    Tablet 40 milliGRAM(s) Oral before breakfast    MEDICATIONS  (PRN):  acetaminophen   Tablet .. 650 milliGRAM(s) Oral every 6 hours PRN Temp greater or equal to 38C (100.4F)  acetaminophen   Tablet .. 650 milliGRAM(s) Oral every 6 hours PRN Mild Pain (1 - 3)  traMADol 50 milliGRAM(s) Oral three times a day PRN Moderate Pain (4 - 6)        Allergies    No Known Allergies        LABS:                        13.1   6.68  )-----------( 95       ( 02 Sep 2019 05:44 )             39.1       09-02    135  |  101  |  9<L>  ----------------------------<  157<H>  3.7   |  23  |  0.8    Ca    8.6      02 Sep 2019 05:44  Mg     1.8     09-02    TPro  6.7  /  Alb  4.3  /  TBili  0.7  /  DBili  x   /  AST  45<H>  /  ALT  26  /  AlkPhos  49  09-02    PT/INR - ( 02 Sep 2019 05:44 )   PT: 15.30 sec;   INR: 1.33 ratio      PTT - ( 02 Sep 2019 18:10 )  PTT:39.2 sec        CAPILLARY BLOOD GLUCOSE  POCT Blood Glucose.: 144 mg/dL (02 Sep 2019 19:48)      RADIOLOGY & ADDITIONAL TESTS:    < from: CT Neck Soft Tissue w/ IV Cont (09.01.19 @ 20:28) >  Bilateral parotid, submandibular, and sublingual glands are symmetric and   enhance homogeneously.    The adenoidal soft tissues, lingual tonsils and palatine tonsils are   grossly unremarkable.    The soft tissues of the oral cavity are obscured by metallic streaking   artifact.  Soft tissues of the floor of the mouth and tongue base are   symmetric and within normal limits.      The pre-epiglottic space is clear.  Bilateral aryepiglottic folds are   within normal limits bilateral pyriform sinuses are patent.The true and   false vocal cords are within normal limits.     The thyroid gland is symmetric and enhances homogeneously.  The great   vessels of the neck are unremarkable. No cervical lymphadenopathy.   Straightening of the normal curvature of the cervical spine.    Right apical calcified granuloma. The upper heart and mediastinum are   within normal limits. Main pulmonary artery measuring at the upper limits   of normal at 2.9 cm (5/269).    Few locules of air adjacent to the left molar likely secondary to recent   dental procedure.    < end of copied text > Patient is seen and examined at the bed side, spiked fever and currently is afebrile. The Left mandibular area/neck pain persist, CT neck was not revealing. The OMSF recommendation noted.         REVIEW OF SYSTEMS: All other review systems are negative        ICU Vital Signs Last 24 Hrs  T(C): 37.7 (02 Sep 2019 20:00), Max: 38.7 (02 Sep 2019 18:00)  T(F): 99.8 (02 Sep 2019 20:00), Max: 101.6 (02 Sep 2019 18:00)  HR: 84 (02 Sep 2019 22:00) (83 - 103)  BP: 104/63 (02 Sep 2019 22:00) (101/59 - 158/92)  BP(mean): 83 (02 Sep 2019 22:00) (75 - 118)  ABP: --  ABP(mean): --  RR: 22 (02 Sep 2019 22:00) (16 - 22)  SpO2: 99% (02 Sep 2019 22:00) (95% - 100%)        PHYSICAL EXAM:  GENERAL: Not in distress  HEENT: Left Mandibular area, Parotid area swollen, tender and firm  CHEST/LUNG: Clear to percussion bilaterally  HEART: s1 and s2 present   ABDOMEN: Nontender and non distended  EXTREMITIES: No pedal  edema  CNS: Awake and Alert        MEDICATIONS  (STANDING):  abacavir 600 mG/dolutegravir 50 mG/lamiVUDine 300 mG 1 Tablet(s) Oral daily  amoxicillin 500 milliGRAM(s) Oral three times a day  aspirin enteric coated 81 milliGRAM(s) Oral daily  atorvastatin 40 milliGRAM(s) Oral at bedtime  chlorhexidine 4% Liquid 1 Application(s) Topical <User Schedule>  heparin  Infusion 1150 Unit(s)/Hr (11.5 mL/Hr) IV Continuous <Continuous>  metoprolol tartrate 25 milliGRAM(s) Oral two times a day  pantoprazole    Tablet 40 milliGRAM(s) Oral before breakfast    MEDICATIONS  (PRN):  acetaminophen   Tablet .. 650 milliGRAM(s) Oral every 6 hours PRN Temp greater or equal to 38C (100.4F)  acetaminophen   Tablet .. 650 milliGRAM(s) Oral every 6 hours PRN Mild Pain (1 - 3)  traMADol 50 milliGRAM(s) Oral three times a day PRN Moderate Pain (4 - 6)        Allergies    No Known Allergies        LABS:                        13.1   6.68  )-----------( 95       ( 02 Sep 2019 05:44 )             39.1       09-02    135  |  101  |  9<L>  ----------------------------<  157<H>  3.7   |  23  |  0.8    Ca    8.6      02 Sep 2019 05:44  Mg     1.8     09-02    TPro  6.7  /  Alb  4.3  /  TBili  0.7  /  DBili  x   /  AST  45<H>  /  ALT  26  /  AlkPhos  49  09-02    PT/INR - ( 02 Sep 2019 05:44 )   PT: 15.30 sec;   INR: 1.33 ratio      PTT - ( 02 Sep 2019 18:10 )  PTT:39.2 sec        CAPILLARY BLOOD GLUCOSE  POCT Blood Glucose.: 144 mg/dL (02 Sep 2019 19:48)      RADIOLOGY & ADDITIONAL TESTS:    < from: CT Neck Soft Tissue w/ IV Cont (09.01.19 @ 20:28) >  Bilateral parotid, submandibular, and sublingual glands are symmetric and   enhance homogeneously.    The adenoidal soft tissues, lingual tonsils and palatine tonsils are   grossly unremarkable.    The soft tissues of the oral cavity are obscured by metallic streaking   artifact.  Soft tissues of the floor of the mouth and tongue base are   symmetric and within normal limits.      The pre-epiglottic space is clear.  Bilateral aryepiglottic folds are   within normal limits bilateral pyriform sinuses are patent.The true and   false vocal cords are within normal limits.     The thyroid gland is symmetric and enhances homogeneously.  The great   vessels of the neck are unremarkable. No cervical lymphadenopathy.   Straightening of the normal curvature of the cervical spine.    Right apical calcified granuloma. The upper heart and mediastinum are   within normal limits. Main pulmonary artery measuring at the upper limits   of normal at 2.9 cm (5/269).    Few locules of air adjacent to the left molar likely secondary to recent   dental procedure.    < end of copied text >

## 2019-09-02 NOTE — H&P ADULT - NSICDXPASTMEDICALHX_GEN_ALL_CORE_FT
PAST MEDICAL HISTORY:  Diabetes     HIV (human immunodeficiency virus infection)     Hypercholesteremia

## 2019-09-02 NOTE — H&P ADULT - ASSESSMENT
49 y/o M with PMH of HIV (on treatment), DM, DLD, presents to the ED for left tooth pain for 1 week.     # Pericoronitis   - febrile at home to 105.   - s/p 2.7 L bolus and unasyn and vancomycin in ED  - Dental consult noted and appreciated  - start Unasyn and vancomycin  - soft diet. speech and swallow eval if patient is unable to tolerate  - Dental F/U with [facility] on Tuesday.  - f/u ID    # HIV  - f/u CD4 and viral load  - continue Triumeq    # ST elevation in leads V1-V3. Asymptomatic  - Code STEMI called in ED. Seen by cardiology resident Dr. Ken, and code cancelled.   - Pt's known to have abnormal EKG which pt had work up as outpatient with treadmill stress test and Echo 6-7 months ago noted no abnormal findings at the time  - two serial EKGs reviewed with Cardiologist on call  - f/u troponin  - f/u am toponin and repeat EKG    # DM  - hold oral anti-glycemics  - fingersticks ACHS  - Start on basal/bolus and SSI insulin if fs > 180     # DLD  - f/u am lipid profile  - continue statin    dvt ppx: lovenox  gi ppx: not indicated  Soft diet  ambulate as tolerated  dispo: from home  FULL CODE 49 y/o M with PMH of HIV (on treatment), DM, DLD, presents to the ED for left tooth pain for 1 week.     # Pericoronitis   - febrile at home to 105.   - s/p 2.7 L bolus and unasyn and vancomycin in ED  - Dental consult noted and appreciated  - start Unasyn and vancomycin  - soft diet. speech and swallow eval if patient is unable to tolerate  - Dental F/U with [facility] on Tuesday.  - f/u ID    # HIV  - f/u CD4 and viral load  - continue Triumeq    # ST elevation in leads V1-V3. Asymptomatic  - Code STEMI called in ED. Seen by cardiology resident Dr. Ken, and code cancelled.   - Pt's known to have abnormal EKG which pt had work up as outpatient with treadmill stress test and Echo 6-7 months ago noted no abnormal findings at the time  - two serial EKGs reviewed with Cardiologist on call  - troponin positive at 0.45. CKMB: 26.5.   - f/u am toponin and repeat EKG  - start heparin drip.     # DM  - hold oral anti-glycemics  - fingersticks ACHS  - Start on basal/bolus and SSI insulin if fs > 180     # DLD  - f/u am lipid profile  - continue statin    dvt ppx: lovenox  gi ppx: not indicated  Soft diet  ambulate as tolerated  dispo: from home  FULL CODE 47 y/o M with PMH of HIV (on treatment), DM, DLD, presents to the ED for left tooth pain for 1 week.     # Pericoronitis   - febrile at home to 105.   - s/p 2.7 L bolus and unasyn and vancomycin in ED  - Dental consult noted and appreciated  - start Unasyn and vancomycin  - soft diet. speech and swallow eval if patient is unable to tolerate  - Dental F/U with [facility] on Tuesday.  - f/u ID    # HIV  - f/u CD4 and viral load  - continue Triumeq    # ST elevation in leads V1-V3. Asymptomatic  - Code STEMI called in ED. Seen by cardiology resident Dr. Ken, and code cancelled.   - Pt's known to have abnormal EKG which pt had work up as outpatient with treadmill stress test and Echo 6-7 months ago noted no abnormal findings at the time  - two serial EKGs reviewed with Cardiologist on call  - troponin positive at 0.45. CKMB: 26.5.   - f/u am toponin and repeat EKG    # DM  - hold oral anti-glycemics  - fingersticks ACHS  - Start on basal/bolus and SSI insulin if fs > 180     # DLD  - f/u am lipid profile  - continue statin    dvt ppx: lovenox  gi ppx: not indicated  Soft diet  ambulate as tolerated  dispo: from home  FULL CODE 49 y/o M with PMH of HIV (on treatment), DM, DLD, presents to the ED for left tooth pain for 1 week. Code STEMI called in ED for ST elevation and positive cardiac enzymes.     # STEMI  - Code STEMI called in ED. Seen by cardiology fellow, Dr Ken. Upgrade to CCU approved by Dr Bennett  - Pt's known to have abnormal EKG which and recently had work up as outpatient with treadmill stress test and Echo 6-7 months ago noted no abnormal findings at the time  - troponin positive at 0.45. CKMB: 26.5.   - aspirin and plavix loading dose  - start patient on heparin drip  - f/u am toponin and repeat EKG  - cardiology follow up.     # Pericoronitis   - febrile at home to 105.   - s/p 2.7 L bolus and unasyn and vancomycin in ED  - Dental consult noted and appreciated  - start Unasyn and vancomycin  - soft diet. speech and swallow eval if patient is unable to tolerate  - Dental F/U with [facility] on Tuesday.  - f/u ID    # HIV  - f/u CD4 and viral load  - continue Triumeq    # DM  - hold oral anti-glycemics  - fingersticks ACHS  - Start on basal/bolus and SSI insulin if fs > 180     # DLD  - f/u am lipid profile  - continue statin    dvt ppx: lovenox  gi ppx: not indicated  Soft diet  ambulate as tolerated  dispo: from home  FULL CODE 47 y/o M with PMH of HIV (on treatment), DM, DLD, presents to the ED for left tooth pain for 1 week. Code STEMI called in ED for ST elevation and positive cardiac enzymes.     # STEMI  - Code STEMI called in ED. Seen by cardiology fellow, Dr Ken. Upgrade to CCU approved by Dr Bennett  - Pt's known to have abnormal EKG which and recently had work up as outpatient with treadmill stress test and Echo 6-7 months ago noted no abnormal findings at the time  - troponin positive at 0.45. CKMB: 26.5.   - aspirin and plavix loading dose  - start patient on heparin drip  - f/u am toponin and repeat EKG  - f/u TTE  - cardiology follow up.     # Pericoronitis   - febrile at home to 105.   - s/p 2.7 L bolus and unasyn and vancomycin in ED  - Dental consult noted and appreciated  - start Unasyn and vancomycin  - soft diet. speech and swallow eval if patient is unable to tolerate  - Dental F/U with [facility] on Tuesday.  - f/u ID    # HIV  - f/u CD4 and viral load  - continue Triumeq    # DM  - hold oral anti-glycemics  - fingersticks ACHS  - Start on basal/bolus and SSI insulin if fs > 180     # DLD  - f/u am lipid profile  - continue statin    dvt ppx: lovenox  gi ppx: not indicated  npo for now  ambulate as tolerated  dispo: from home  FULL CODE 49 y/o M with PMH of HIV (on treatment), DM, DLD, presents to the ED for left tooth pain for 1 week. Code STEMI called in ED for ST elevation and positive cardiac enzymes.     # STEMI  - Code STEMI called in ED. Seen by cardiology fellow, Dr Ken. Upgrade to CCU approved by Dr Bennett  - Pt's known to have abnormal EKG which and recently had work up as outpatient with treadmill stress test and Echo 6-7 months ago noted no abnormal findings at the time  - troponin positive at 0.45. CKMB: 26.5.   - aspirin and plavix loading dose. start BB  - start patient on heparin drip  - f/u am toponin and repeat EKG  - f/u TTE  - npo  - cardiology follow up.     # Pericoronitis   - febrile at home to 105.   - s/p 2.7 L bolus and unasyn and vancomycin in ED  - Dental consult noted and appreciated  - start Unasyn and vancomycin  - soft diet. speech and swallow eval if patient is unable to tolerate  - Dental F/U with [facility] on Tuesday.  - f/u ID    # HIV  - f/u CD4 and viral load  - continue Triumeq    # DM  - hold oral anti-glycemics  - fingersticks ACHS  - Start on basal/bolus and SSI insulin if fs > 180     # DLD  - f/u am lipid profile  - continue statin    dvt ppx: lovenox  gi ppx: not indicated  npo for now  ambulate as tolerated  dispo: from home  FULL CODE 47 y/o M with PMH of HIV (on treatment), DM, DLD, presents to the ED for left tooth pain for 1 week. Code STEMI called in ED for ST elevation and positive cardiac enzymes.     # STEMI  - Code STEMI called in ED. Seen by cardiology fellow, Dr Ken. Upgrade to CCU approved by Dr Bennett  - Pt's known to have abnormal EKG which and recently had work up as outpatient with treadmill stress test and Echo 6-7 months ago noted no abnormal findings at the time  - troponin positive at 0.45. CKMB: 26.5.   - aspirin and plavix loading dose. start BB  - start patient on heparin drip  - f/u am toponin and repeat EKG  - f/u TTE  - npo  - cardiology follow up.     # Pericoronitis   - febrile at home to 105.   - s/p 2.7 L bolus and unasyn and vancomycin in ED  - Dental consult noted and appreciated  - start Unasyn   - soft diet. speech and swallow eval if patient is unable to tolerate  - Dental F/U with [facility] on Tuesday.  - f/u ID    # HIV  - f/u CD4 and viral load  - continue Triumeq    # DM  - hold oral anti-glycemics  - fingersticks ACHS  - Start on basal/bolus and SSI insulin if fs > 180     # DLD  - f/u am lipid profile  - continue statin    dvt ppx: lovenox  gi ppx: not indicated  npo for now  ambulate as tolerated  dispo: from home  FULL CODE

## 2019-09-02 NOTE — CHART NOTE - NSCHARTNOTEFT_GEN_A_CORE
STEMI called, which I immediately responded  Pt denies any chest pain  Pt had left sided jaw and gum pain with presence of tenderness upon palpation of the jaw, mainly presented with chief compliant of dental pain and jaw pain persistent for days with recent dental work  Pt's known to have abnormal EKG which pt had work up as outpatient with treadmill stress test and Echo 6-7 months ago noted no abnormal findings at the time  EKG reviewed with Cardiologist on call  STEMI code cancelled STEMI called, which I immediately responded  Pt denies any chest pain, SOB or palpitations  Pt had left sided jaw and gum pain with presence of tenderness upon palpation of the jaw, mainly presented with chief compliant of fever, dental pain and jaw pain persistent for days with recent dental work  Pt's known to have abnormal EKG which pt had work up as outpatient with treadmill stress test and Echo 6-7 months ago noted no abnormal findings at the time  two serial EKGs reviewed with Cardiologist on call  STEMI code cancelled STEMI called, which I immediately responded  Pt denies any chest pain, SOB or palpitations  Pt had left sided jaw and gum pain with presence of tenderness upon palpation of the jaw, mainly presented with chief compliant of fever, dental pain and jaw pain persistent for days with recent dental work  Pt's known to have abnormal EKG which pt had work up as outpatient with treadmill stress test and Echo 6-7 months ago noted no abnormal findings at the time  two serial EKGs reviewed with Cardiologist on call, no dyanmic changes  STEMI code cancelled

## 2019-09-02 NOTE — H&P ADULT - NSHPPHYSICALEXAM_GEN_ALL_CORE
GENERAL: NAD, speaks in full sentences, no signs of respiratory distress  HEAD: tenderness to palpation over left jaw-line. mild swelling of left neck  NECK: Supple  CHEST/LUNG: Clear to auscultation bilaterally; No wheeze or crackles  HEART: S1, S2; RRR; No murmurs, rubs, or gallops  ABDOMEN: BS+; Soft, Non-tender, Non-distended  EXTREMITIES:  2+ Peripheral Pulses, No clubbing, cyanosis, or edema  PSYCH: AAOx3  NEUROLOGY: non-focal  SKIN: No rashes or lesions

## 2019-09-02 NOTE — CONSULT NOTE ADULT - SUBJECTIVE AND OBJECTIVE BOX
Date of Admission: 09-01-19    CHIEF COMPLAINT: Patient is a 48y old  Male who presents with a chief complaint of Fevers and left-sided tooth/facial pain (02 Sep 2019 01:07)      HPI:  49 y/o M with PMH of HIV (on treatment), DM, DLD, presents to the ED for left tooth pain for 1 week. He says the pain is left sided and radiates up to his head. It is shock-like and pulsatile in nature. Originates at angle of jaw. Made worse by chewing food and palpation of outside of his jaw. He was recently seen by dentist and had a filling placed and was told he would need to have his left wisdom teeth extracted. He has an appointment scheduled with them for Tuesday. He had a fever at home to up to 105 since last night. He took motrin 800mg 10 am and tylenol 500mg 3 pm with minimal relieft of his pain. He went to a walk in clinic where he received amoxicillin and told to come to the ED. He denies changes in vision or hearing, denies neck pain or cough, no SOB or dysphagia/odynophagia. He denies chest pain, SOB, palpitations, abdominal pain, nausea/vomiting/constipation, urinary symptoms or lower extremity swelling. He has HIV with 0 viral load and normal CD4, as per patient. In ED, STEMI code was called for ST elevation on V1-V3. Stat enzymes were positive. Patient denied chest pain and recently had a stress test and echo by Dr Griffin for abnormal EKG findings. He reports workup was negative. (02 Sep 2019 01:07)      PAST MEDICAL & SURGICAL HISTORY:  Diabetes  Hypercholesteremia  HIV (human immunodeficiency virus infection)  History of appendectomy      FAMILY HISTORY:  FH: diabetes mellitus: mother and father  FH: heart failure: mother  [x] no pertinent family history of premature cardiovascular disease in first degree relatives.    SOCIAL HISTORY:    [x] Active smoker  [x] No alcohol use  [x] No illicit drug use    Allergies: No Known Allergies	    REVIEW OF SYSTEMS:  CONSTITUTIONAL: (+) Fever 105. No weight loss, or fatigue  CARDIOLOGY: No chest pain, shortness of breath or syncopal episodes.   RESPIRATORY: No shortness of breath, wheezing.   NEUROLOGICAL: No weakness, no focal deficits to report.  GI: No BRBPR, no N,V,diarrhea.    PSYCHIATRY: Normal mood and affect.  HEENT: No nasal discharge, no ecchymosis. (+) Jaw pain and tenderness.  SKIN: No ecchymosis, no breakdown  MUSCULOSKELETAL: Full range of motion x4.   EXTREM: No leg swelling or erythema.    PHYSICAL EXAM:  T(C): 36.8 (09-01-19 @ 23:54), Max: 39.6 (09-01-19 @ 18:09)  HR: 83 (09-01-19 @ 23:54) (83 - 118)  BP: 123/80 (09-01-19 @ 23:54) (123/80 - 126/74)  RR: 18 (09-01-19 @ 23:54) (18 - 20)  SpO2: 97% (09-01-19 @ 18:09) (97% - 97%)  Wt(kg): --  I&O's Summary      General Appearance: Well appearing, normal for age and gender. 	  Neck: Normal JVP, no bruit.   Eyes: No xanthomalasia, Extra Ocular muscles intact.   HENT:  No lesion. Jaw and neck tenderness upon palpation.  Cardiovascular: Regular rate and rhythm S1 S2, No JVD, No murmurs, No edema  Respiratory: Lungs clear to auscultation	  Psychiatry: Alert and oriented x 3, Mood & affect appropriate  Gastrointestinal:  Soft, Non-tender  Skin/Integumen: No rashes, No ecchymoses, No cyanosis	  Neurologic: Non-focal deficits.  Musculoskeletal/ extremities: Normal range of motion, No clubbing, cyanosis or edema  Vascular: Peripheral pulses palpable 2+ bilaterally    LABS:	 	                        15.5   8.67  )-----------( 104      ( 01 Sep 2019 18:50 )             45.6     09-01    136  |  96<L>  |  12  ----------------------------<  159<H>  4.1   |  25  |  1.0    Ca    9.5      01 Sep 2019 18:50    TPro  8.0  /  Alb  4.9  /  TBili  0.9  /  DBili  x   /  AST  33  /  ALT  26  /  AlkPhos  58  09-01    CARDIAC MARKERS ( 02 Sep 2019 00:53 )  x     / 0.45 ng/mL / 495 U/L / x     / 26.5 ng/mL      PT/INR - ( 01 Sep 2019 18:50 )   PT: 13.90 sec;   INR: 1.21 ratio         PTT - ( 01 Sep 2019 18:50 )  PTT:31.9 sec    CARDIAC MARKERS:      TELEMETRY EVENTS: 	    ECG: Normal sinus rhythm. ST elevation in V1-V3	with Q waves  RADIOLOGY: < from: CT Neck Soft Tissue w/ IV Cont (09.01.19 @ 20:28) >  Normal CT scan of the soft tissues of the neckwith contrast.    OTHER: 	  Home Medications:  Janumet 50 mg-1000 mg oral tablet: 1 tab(s) orally 2 times a day (02 Sep 2019 01:15)  omeprazole 40 mg oral delayed release capsule: 1 cap(s) orally once a day (02 Sep 2019 01:15)  rosuvastatin 20 mg oral tablet: 1 tab(s) orally once a day (02 Sep 2019 01:15)  Triumeq oral tablet: 1 tab(s) orally once a day (02 Sep 2019 01:15)    MEDICATIONS  (STANDING):  abacavir 600 mG/dolutegravir 50 mG/lamiVUDine 300 mG 1 Tablet(s) Oral daily  aspirin enteric coated 325 milliGRAM(s) Oral once  aspirin enteric coated 81 milliGRAM(s) Oral daily  atorvastatin 40 milliGRAM(s) Oral at bedtime  chlorhexidine 0.12% Liquid 15 milliLiter(s) Oral Mucosa two times a day  chlorhexidine 4% Liquid 1 Application(s) Topical <User Schedule>  clopidogrel Tablet 300 milliGRAM(s) Oral once  pantoprazole    Tablet 40 milliGRAM(s) Oral before breakfast    MEDICATIONS  (PRN):

## 2019-09-02 NOTE — PROGRESS NOTE ADULT - ASSESSMENT
# Ehsan's Angina vs  Lemierre's syndrome    would recommend:     1. Follow up cultures  2. Start on Unasyn ( If shortage of Unasyn then can be start on Zosyn)  and Clindamycin until work up is done  3. Monitor Temp. and c/w supportive care Patient is a 48y old  Male who presents with a chief complaint of Fevers and left-sided tooth/facial pain (02 Sep 2019 12:31)    # Ehsan's Angina - Most likely  # s/p code STEMI - Management by CCU team    would recommend:     1. Follow up Blood cultures  2. Start on Unasyn ( If shortage of Unasyn then can be start on Zosyn)  and Clindamycin until work up is done  3. Monitor Temp. and c/w supportive care  4. Management of STEMI as per CCU team  5. Continue HAART  6. Pain management as needed    d/w patient and CCU team

## 2019-09-02 NOTE — H&P ADULT - NSHPLABSRESULTS_GEN_ALL_CORE
15.5   8.67  )-----------( 104      ( 01 Sep 2019 18:50 )             45.6       09-01    136  |  96<L>  |  12  ----------------------------<  159<H>  4.1   |  25  |  1.0    Ca    9.5      01 Sep 2019 18:50    TPro  8.0  /  Alb  4.9  /  TBili  0.9  /  DBili  x   /  AST  33  /  ALT  26  /  AlkPhos  58  09-01      PT/INR - ( 01 Sep 2019 18:50 )   PT: 13.90 sec;   INR: 1.21 ratio         PTT - ( 01 Sep 2019 18:50 )  PTT:31.9 sec    RADIOLOGY:  < from: CT Neck Soft Tissue w/ IV Cont (09.01.19 @ 20:28) >      Impression:     Normal CT scan of the soft tissues of the neckwith contrast.      < end of copied text >

## 2019-09-02 NOTE — CONSULT NOTE ADULT - ASSESSMENT
A/P: 48M pmhx DM/HIV/HLD with left mandibular pain of odontogenic origin (teeth #16, 17) d/t caries, less likely pericoronitis. Code STEMI activated, upgraded to CCU for positive troponemia. Hemodynamically stable.     -No acute surgical intervention per OMFS  -Discontinue iv antibiotics and peridex, recommend PO amoxicillin to complete 7 days course  -Pain control  -Cardiac workup/monitoring per ccu  -Continue care per primary team  -OMFS following, please page if any questions or concerns    D/w Dr. Andersen A/P: 48M pmhx DM/HIV/HLD with left mandibular pain of odontogenic origin (teeth #16, 17) d/t caries, less likely pericoronitis as soft tissue is asymptomatic and non-edematous. Code STEMI activated, upgraded to CCU for positive troponemia and pos ekg findings, asymtopmatic. Hemodynamically stable.     -No acute surgical intervention per OMFS  -Discontinue iv antibiotics and peridex, recommend PO amoxicillin to complete 7 days course  -Pain control  -Cardiac workup/monitoring per ccu  -Continue care per primary team  -OMFS following, please page if any questions or concerns    D/w Dr. Andersen

## 2019-09-02 NOTE — ED ADULT NURSE REASSESSMENT NOTE - NS ED NURSE REASSESS COMMENT FT1
EKG performed on pt, code STEMI was called, Len MIR request green and gold top to be draw, meds will not be given at this time, pt not moved to crit at this time, pt placed on cardiac monitor, pt denies CP, sob, diaphoresis, nausea

## 2019-09-02 NOTE — PROVIDER CONTACT NOTE (OTHER) - SITUATION
Patient remains on Heparin Infusion at 900units/hr as ordered PATIENT SPECIFIC PROTOCOL. Last PTT results around 0545AM. NO PTT ordered since then.

## 2019-09-02 NOTE — ED ADULT NURSE REASSESSMENT NOTE - NS ED NURSE REASSESS COMMENT FT1
pt was upgraded to ccu, pt moved to crit, medications administered as ordered, pt stable at this time, pt denies cp at this time

## 2019-09-02 NOTE — H&P ADULT - HISTORY OF PRESENT ILLNESS
49 y/o M with PMH of HIV (on treatment), DM, DLD, presents to the ED for left tooth pain for 1 week. He says the pain is left sided and radiates up to his head. It is shock-like and pulsatile in nature. Originates at angle of jaw. Made worse by chewing food and palpation of outside of his jaw. He was recently seen by dentist and had a filling placed and was told he would need to have his left wisdom teeth extracted. He has an appointment scheduled with them for Tuesday. He had a fever at home to up to 105 since last night. He took motrin 800mg 10 am and tylenol 500mg 3 pm with minimal relieft of his pain. He went to a walk in clinic where he received amoxicillin and told to come to the ED. He denies changes in vision or hearing, denies neck pain or cough, no SOB or dysphagia/odynophagia. He denies chest pain, SOB, palpitations, abdominal pain, nausea/vomiting/constipation, urinary symptoms or lower extremity swelling. He has HIV with 0 viral load and normal CD4, as per patient. In ED, STEMI code was called for ST elevation on V1-V3. Stat enzymes were drawn and sent to ED. Patient denied chest pain and recently had a stress test and echo by Dr Griffin for abnormal EKG findings. He reports workup was negative. 49 y/o M with PMH of HIV (on treatment), DM, DLD, presents to the ED for left tooth pain for 1 week. He says the pain is left sided and radiates up to his head. It is shock-like and pulsatile in nature. Originates at angle of jaw. Made worse by chewing food and palpation of outside of his jaw. He was recently seen by dentist and had a filling placed and was told he would need to have his left wisdom teeth extracted. He has an appointment scheduled with them for Tuesday. He had a fever at home to up to 105 since last night. He took motrin 800mg 10 am and tylenol 500mg 3 pm with minimal relieft of his pain. He went to a walk in clinic where he received amoxicillin and told to come to the ED. He denies changes in vision or hearing, denies neck pain or cough, no SOB or dysphagia/odynophagia. He denies chest pain, SOB, palpitations, abdominal pain, nausea/vomiting/constipation, urinary symptoms or lower extremity swelling. He has HIV with 0 viral load and normal CD4, as per patient. In ED, STEMI code was called for ST elevation on V1-V3. Stat enzymes were positive. Patient denied chest pain and recently had a stress test and echo by Dr Griffin for abnormal EKG findings. He reports workup was negative. 49 y/o M with PMH of HIV (on treatment), DM, DLD, presents to the ED for left tooth pain for 1 week. He says the pain is left sided and radiates up to his head. It is shock-like and pulsatile in nature. Originates at angle of jaw. Made worse by chewing food and palpation of outside of his jaw. He was recently seen by dentist and had a filling placed and was told he would need to have his left wisdom teeth extracted. He has an appointment scheduled with them for Tuesday. He had a fever at home to up to 105 since last night. He took motrin 800mg 10 am and tylenol 500mg 3 pm with minimal relieft of his pain. He went to a walk in clinic where he received amoxicillin and told to come to the ED. He denies changes in vision or hearing, denies neck pain or cough, no SOB or dysphagia/odynophagia. He denies chest pain, SOB, palpitations, abdominal pain, nausea/vomiting/constipation, urinary symptoms or lower extremity swelling. He has HIV with 0 viral load and normal CD4, as per patient.     In ED, STEMI code was called for ST elevation on V1-V3. Stat enzymes were positive. Patient denied chest pain, palpitations, or SOB, and recently had a stress test and echo by Dr Griffin for abnormal EKG findings. He reports workup was negative. Seen by cardiology fellow, Dr Ken and upgrade to CCU approved by Dr Sloan.

## 2019-09-02 NOTE — PATIENT PROFILE ADULT - NSPROGENANESREACTION_GEN_A_NUR
never had anesthesia I have personally performed a face to face diagnostic evaluation on this patient. I have reviewed the ACP note and agree with the history, exam and plan of care, except as noted.

## 2019-09-02 NOTE — CONSULT NOTE ADULT - SUBJECTIVE AND OBJECTIVE BOX
Oral & Maxillofacial Surgery Consult Note    Pt name: Ana M Belcher  MRN: [id number]    HPI: 49 y/o M with PMH of HIV (on treatment), DM, DLD, presents to the ED for left tooth pain for 1 week. He says the pain is left sided and radiates up to his head. It is shock-like and pulsatile in nature. Originates at angle of jaw. Made worse by chewing food and palpation of outside of his jaw. He was recently seen by dentist and had a filling placed and was told he would need to have his left wisdom teeth extracted. He has an appointment scheduled with them for Tuesday. He had a fever at home to up to 105 since last night. He took motrin 800mg 10 am and tylenol 500mg 3 pm with minimal relieft of his pain. He went to a walk in clinic where he received amoxicillin and told to come to the ED. He denies changes in vision or hearing, denies neck pain or cough, no SOB or dysphagia/odynophagia. He denies chest pain, SOB, palpitations, abdominal pain, nausea/vomiting/constipation, urinary symptoms or lower extremity swelling. He has HIV with 0 viral load and normal CD4, as per patient. In ED, STEMI code was called for ST elevation on V1-V3. Stat enzymes were positive. Patient denied chest pain, palpitations, or SOB, and recently had a stress test and echo by Dr Griffin for abnormal EKG findings. He reports workup was negative. Seen by cardiology fellow, Dr Ken and upgrade to CCU approved by Dr Sloan.     OMFS: pt was seen and examined in ED critical care area #1. Pt resting comfortably in bed, denies any sob/cp, difficulty breathing. Only endorses pain to left jaw and headache. Denies dysphagia or strange taste in mouth. Pt reports pain to have been present for 1 week gradually increasing and developed fever last night. Plan was to have tooth #16 top wisdom tooth extracted tomorrow 9/3 with omfs dr. calhoun.     ROS: as per hpi  MHx: DM, HIV (undetected VL), HLD  Meds: rosuvastatin, trimumeq, janumet, omeprazole  Allergies: NKDA  PSHx: appendectomy  SocHx: social drinker, smokes 10 cigarettes per day, denies illicits    PE:  vitals reviewed. Currently Tc 100.6F, non-tachycardic, normotensive  in no acute distress  breathing RA unlabored  no facial extraoral swelling, neck soft  intraorally, mild trismus present, ALESSIO > 25 mm. Calculus present by left mandibular molars. Teeth #16 and #17 left upper and lower wisdom teeth, carious, tender to percussion/palpation. Gingiva no ttp. No i/o swelling.     Labs:   No leukocytosis, wbc 6.68  /3.7/101/23/9/0.8<157  Trop: 0.45>0.49  CKMB 26.5    Imaging:  EKG: NSR  CT Neck w/ contrast: Normal CT scan of the soft tissues of the neck with contrast. Oral & Maxillofacial Surgery Consult Note    Pt name: Ana M Belcher  MRN: [id number]    HPI: 49 y/o M with PMH of HIV (on treatment), DM, DLD, presents to the ED for left tooth pain for 1 week. He says the pain is left sided and radiates up to his head. It is shock-like and pulsatile in nature. Originates at angle of jaw. Made worse by chewing food and palpation of outside of his jaw. He was recently seen by dentist and had a filling placed and was told he would need to have his left wisdom teeth extracted. He has an appointment scheduled with them for Tuesday. He had a fever at home to up to 105 since last night. He took motrin 800mg 10 am and tylenol 500mg 3 pm with minimal relieft of his pain. He went to a walk in clinic where he received amoxicillin and told to come to the ED. He denies changes in vision or hearing, denies neck pain or cough, no SOB or dysphagia/odynophagia. He denies chest pain, SOB, palpitations, abdominal pain, nausea/vomiting/constipation, urinary symptoms or lower extremity swelling. He has HIV with 0 viral load and normal CD4, as per patient. In ED, STEMI code was called for ST elevation on V1-V3. Stat enzymes were positive. Patient denied chest pain, palpitations, or SOB, and recently had a stress test and echo by Dr Griffin for abnormal EKG findings. He reports workup was negative. Seen by cardiology fellow, Dr Ken and upgrade to CCU approved by Dr Sloan.     OMFS: pt was seen and examined in ED critical care area #1. Pt resting comfortably in bed, denies any sob/cp, difficulty breathing. Only endorses pain to left jaw and headache. Denies dysphagia or strange taste in mouth. Pt reports pain to have been present for 1 week gradually increasing and developed fever last night. Plan was to have tooth #16 top wisdom tooth extracted tomorrow 9/3 with omfs dr. calhoun.     ROS: as per hpi  MHx: DM, HIV (undetected VL), HLD  Meds: rosuvastatin, trimumeq, janumet, omeprazole  Allergies: NKDA  PSHx: appendectomy  SocHx: social drinker, smokes 10 cigarettes per day, denies illicits    PE:  vitals reviewed. Currently Tc 100.6F, non-tachycardic, normotensive  in no acute distress  breathing RA unlabored  no facial extraoral swelling, neck soft  intraorally, mild trismus present, ALESSIO > 25 mm. Calculus present by left mandibular molars. Teeth #16 and #17 left upper and lower wisdom teeth, carious, tender to percussion/palpation. Gingiva no ttp, non-edematous. No i/o swelling.     Labs:   No leukocytosis, wbc 6.68  /3.7/101/23/9/0.8<157  Trop: 0.45>0.49  CKMB 26.5    Imaging:  EKG: NSR  CT Neck w/ contrast: Normal CT scan of the soft tissues of the neck with contrast.

## 2019-09-03 LAB
ANION GAP SERPL CALC-SCNC: 13 MMOL/L — SIGNIFICANT CHANGE UP (ref 7–14)
APPEARANCE UR: CLEAR — SIGNIFICANT CHANGE UP
APTT BLD: 49.9 SEC — HIGH (ref 27–39.2)
APTT BLD: 58.3 SEC — HIGH (ref 27–39.2)
APTT BLD: 58.8 SEC — HIGH (ref 27–39.2)
APTT BLD: 61.2 SEC — HIGH (ref 27–39.2)
BACTERIA # UR AUTO: NEGATIVE — SIGNIFICANT CHANGE UP
BASOPHILS # BLD AUTO: 0.02 K/UL — SIGNIFICANT CHANGE UP (ref 0–0.2)
BASOPHILS NFR BLD AUTO: 0.4 % — SIGNIFICANT CHANGE UP (ref 0–1)
BILIRUB UR-MCNC: NEGATIVE — SIGNIFICANT CHANGE UP
BUN SERPL-MCNC: 8 MG/DL — LOW (ref 10–20)
CALCIUM SERPL-MCNC: 8.3 MG/DL — LOW (ref 8.5–10.1)
CHLORIDE SERPL-SCNC: 99 MMOL/L — SIGNIFICANT CHANGE UP (ref 98–110)
CK MB CFR SERPL CALC: 5.9 NG/ML — SIGNIFICANT CHANGE UP (ref 0.6–6.3)
CK MB CFR SERPL CALC: 6.5 NG/ML — HIGH (ref 0.6–6.3)
CK MB CFR SERPL CALC: 7.5 NG/ML — HIGH (ref 0.6–6.3)
CK SERPL-CCNC: 185 U/L — SIGNIFICANT CHANGE UP (ref 0–225)
CK SERPL-CCNC: 228 U/L — HIGH (ref 0–225)
CK SERPL-CCNC: 278 U/L — HIGH (ref 0–225)
CO2 SERPL-SCNC: 22 MMOL/L — SIGNIFICANT CHANGE UP (ref 17–32)
COLOR SPEC: YELLOW — SIGNIFICANT CHANGE UP
CREAT SERPL-MCNC: 0.7 MG/DL — SIGNIFICANT CHANGE UP (ref 0.7–1.5)
DIFF PNL FLD: NEGATIVE — SIGNIFICANT CHANGE UP
EOSINOPHIL # BLD AUTO: 0.05 K/UL — SIGNIFICANT CHANGE UP (ref 0–0.7)
EOSINOPHIL NFR BLD AUTO: 0.9 % — SIGNIFICANT CHANGE UP (ref 0–8)
EPI CELLS # UR: 0 /HPF — SIGNIFICANT CHANGE UP (ref 0–5)
GLUCOSE SERPL-MCNC: 151 MG/DL — HIGH (ref 70–99)
GLUCOSE UR QL: NEGATIVE — SIGNIFICANT CHANGE UP
HCT VFR BLD CALC: 37.6 % — LOW (ref 42–52)
HGB BLD-MCNC: 12.7 G/DL — LOW (ref 14–18)
HIV 1+2 AB+HIV1 P24 AG SERPL QL IA: REACTIVE
HIV1+2 AB SPEC QL: ABNORMAL
HIV1+2 AB SPEC QL: SIGNIFICANT CHANGE UP
HYALINE CASTS # UR AUTO: 0 /LPF — SIGNIFICANT CHANGE UP (ref 0–7)
IMM GRANULOCYTES NFR BLD AUTO: 0.4 % — HIGH (ref 0.1–0.3)
KETONES UR-MCNC: NEGATIVE — SIGNIFICANT CHANGE UP
LEUKOCYTE ESTERASE UR-ACNC: NEGATIVE — SIGNIFICANT CHANGE UP
LYMPHOCYTES # BLD AUTO: 1.13 K/UL — LOW (ref 1.2–3.4)
LYMPHOCYTES # BLD AUTO: 20.8 % — SIGNIFICANT CHANGE UP (ref 20.5–51.1)
MAGNESIUM SERPL-MCNC: 1.9 MG/DL — SIGNIFICANT CHANGE UP (ref 1.8–2.4)
MCHC RBC-ENTMCNC: 29.3 PG — SIGNIFICANT CHANGE UP (ref 27–31)
MCHC RBC-ENTMCNC: 33.8 G/DL — SIGNIFICANT CHANGE UP (ref 32–37)
MCV RBC AUTO: 86.8 FL — SIGNIFICANT CHANGE UP (ref 80–94)
MONOCYTES # BLD AUTO: 0.6 K/UL — SIGNIFICANT CHANGE UP (ref 0.1–0.6)
MONOCYTES NFR BLD AUTO: 11 % — HIGH (ref 1.7–9.3)
NEUTROPHILS # BLD AUTO: 3.62 K/UL — SIGNIFICANT CHANGE UP (ref 1.4–6.5)
NEUTROPHILS NFR BLD AUTO: 66.5 % — SIGNIFICANT CHANGE UP (ref 42.2–75.2)
NITRITE UR-MCNC: NEGATIVE — SIGNIFICANT CHANGE UP
NRBC # BLD: 0 /100 WBCS — SIGNIFICANT CHANGE UP (ref 0–0)
PH UR: 7 — SIGNIFICANT CHANGE UP (ref 5–8)
PLATELET # BLD AUTO: 85 K/UL — LOW (ref 130–400)
POTASSIUM SERPL-MCNC: 3.4 MMOL/L — LOW (ref 3.5–5)
POTASSIUM SERPL-SCNC: 3.4 MMOL/L — LOW (ref 3.5–5)
PROT UR-MCNC: ABNORMAL
RBC # BLD: 4.33 M/UL — LOW (ref 4.7–6.1)
RBC # FLD: 12.5 % — SIGNIFICANT CHANGE UP (ref 11.5–14.5)
RBC CASTS # UR COMP ASSIST: 6 /HPF — HIGH (ref 0–4)
SODIUM SERPL-SCNC: 134 MMOL/L — LOW (ref 135–146)
SP GR SPEC: 1.02 — SIGNIFICANT CHANGE UP (ref 1.01–1.02)
TROPONIN T SERPL-MCNC: 0.33 NG/ML — CRITICAL HIGH
TROPONIN T SERPL-MCNC: 0.34 NG/ML — CRITICAL HIGH
TROPONIN T SERPL-MCNC: 0.36 NG/ML — CRITICAL HIGH
UROBILINOGEN FLD QL: ABNORMAL
WBC # BLD: 5.44 K/UL — SIGNIFICANT CHANGE UP (ref 4.8–10.8)
WBC # FLD AUTO: 5.44 K/UL — SIGNIFICANT CHANGE UP (ref 4.8–10.8)
WBC UR QL: 1 /HPF — SIGNIFICANT CHANGE UP (ref 0–5)

## 2019-09-03 PROCEDURE — 93010 ELECTROCARDIOGRAM REPORT: CPT

## 2019-09-03 PROCEDURE — 99223 1ST HOSP IP/OBS HIGH 75: CPT

## 2019-09-03 RX ORDER — POTASSIUM CHLORIDE 20 MEQ
40 PACKET (EA) ORAL ONCE
Refills: 0 | Status: COMPLETED | OUTPATIENT
Start: 2019-09-03 | End: 2019-09-03

## 2019-09-03 RX ORDER — HEPARIN SODIUM 5000 [USP'U]/ML
1450 INJECTION INTRAVENOUS; SUBCUTANEOUS
Qty: 25000 | Refills: 0 | Status: DISCONTINUED | OUTPATIENT
Start: 2019-09-03 | End: 2019-09-05

## 2019-09-03 RX ORDER — HEPARIN SODIUM 5000 [USP'U]/ML
1350 INJECTION INTRAVENOUS; SUBCUTANEOUS
Qty: 25000 | Refills: 0 | Status: DISCONTINUED | OUTPATIENT
Start: 2019-09-03 | End: 2019-09-03

## 2019-09-03 RX ADMIN — Medication 25 MILLIGRAM(S): at 05:51

## 2019-09-03 RX ADMIN — AMPICILLIN SODIUM AND SULBACTAM SODIUM 200 GRAM(S): 250; 125 INJECTION, POWDER, FOR SUSPENSION INTRAMUSCULAR; INTRAVENOUS at 17:16

## 2019-09-03 RX ADMIN — HEPARIN SODIUM 13.5 UNIT(S)/HR: 5000 INJECTION INTRAVENOUS; SUBCUTANEOUS at 01:00

## 2019-09-03 RX ADMIN — PANTOPRAZOLE SODIUM 40 MILLIGRAM(S): 20 TABLET, DELAYED RELEASE ORAL at 05:51

## 2019-09-03 RX ADMIN — TRAMADOL HYDROCHLORIDE 50 MILLIGRAM(S): 50 TABLET ORAL at 17:44

## 2019-09-03 RX ADMIN — ATORVASTATIN CALCIUM 40 MILLIGRAM(S): 80 TABLET, FILM COATED ORAL at 22:21

## 2019-09-03 RX ADMIN — Medication 100 MILLIGRAM(S): at 05:52

## 2019-09-03 RX ADMIN — Medication 100 MILLIGRAM(S): at 00:45

## 2019-09-03 RX ADMIN — Medication 40 MILLIEQUIVALENT(S): at 06:53

## 2019-09-03 RX ADMIN — Medication 81 MILLIGRAM(S): at 12:14

## 2019-09-03 RX ADMIN — TRAMADOL HYDROCHLORIDE 50 MILLIGRAM(S): 50 TABLET ORAL at 00:05

## 2019-09-03 RX ADMIN — AMPICILLIN SODIUM AND SULBACTAM SODIUM 200 GRAM(S): 250; 125 INJECTION, POWDER, FOR SUSPENSION INTRAMUSCULAR; INTRAVENOUS at 00:46

## 2019-09-03 RX ADMIN — AMPICILLIN SODIUM AND SULBACTAM SODIUM 200 GRAM(S): 250; 125 INJECTION, POWDER, FOR SUSPENSION INTRAMUSCULAR; INTRAVENOUS at 12:23

## 2019-09-03 RX ADMIN — AMPICILLIN SODIUM AND SULBACTAM SODIUM 200 GRAM(S): 250; 125 INJECTION, POWDER, FOR SUSPENSION INTRAMUSCULAR; INTRAVENOUS at 05:52

## 2019-09-03 RX ADMIN — Medication 25 MILLIGRAM(S): at 17:12

## 2019-09-03 RX ADMIN — TRAMADOL HYDROCHLORIDE 50 MILLIGRAM(S): 50 TABLET ORAL at 00:46

## 2019-09-03 RX ADMIN — AMPICILLIN SODIUM AND SULBACTAM SODIUM 200 GRAM(S): 250; 125 INJECTION, POWDER, FOR SUSPENSION INTRAMUSCULAR; INTRAVENOUS at 23:09

## 2019-09-03 RX ADMIN — CLOPIDOGREL BISULFATE 75 MILLIGRAM(S): 75 TABLET, FILM COATED ORAL at 12:14

## 2019-09-03 RX ADMIN — TRAMADOL HYDROCHLORIDE 50 MILLIGRAM(S): 50 TABLET ORAL at 23:52

## 2019-09-03 RX ADMIN — TRAMADOL HYDROCHLORIDE 50 MILLIGRAM(S): 50 TABLET ORAL at 23:12

## 2019-09-03 RX ADMIN — TRAMADOL HYDROCHLORIDE 50 MILLIGRAM(S): 50 TABLET ORAL at 06:57

## 2019-09-03 RX ADMIN — TRAMADOL HYDROCHLORIDE 50 MILLIGRAM(S): 50 TABLET ORAL at 17:22

## 2019-09-03 NOTE — PROGRESS NOTE ADULT - ASSESSMENT
IMPRESSION  47 yo M with HIV on ART, DM, DLD, recent cavity filling 8/26, presents to the ED for worsened left lower molar pain for 1 week, found to have ST elevations V2-3 and elevated CE, fever    ASSESSMENT/RECOMMENDATION  #ST elevations V2-3 and elevated CE, ischemic vs focal myocarditis. CE now downtrending. Pt denies any chest pain. Unclear if tooth pain is a manifestation of cardiac issues as occurred one week prior and pt was diagnosed with a true tooth pathology s/p cavity filling 8/26. Recent EKG/Stress test wnl.   - Cardiology following Dr. Griffin, ?cath   - Trend EKG and Shaneka  - clopidogrel Tablet 75 milliGRAM(s) Oral daily  - heparin  Infusion 1350 Unit(s)/Hr (13.5 mL/Hr) IV Continuous <Continuous>  - aspirin enteric coated 81 milliGRAM(s) Oral daily  - Check Coxsackie Abs  - TTE    #Tooth ache with L-neck swelling. Fevers. CT neck wnl, focal air lower molar likely 2/2 procedure. Doubt Lemierre's, but on differential.   - f/u BCX  - Continue ampicillin/sulbactam  IVPB 3 Gram(s) IV Intermittent every 6 hours  - D/C Clinda  - ENT consult as worsened L neck swelling   - Appreciate OMFS consult  - May need repeat CT neck     #HLD  - Discuss need for high dose statin with Cards, on atorvastatin 40 milliGRAM(s) Oral at bedtime    #Asymptomatic HIV 5/2019 CD4 670, VL UD. Well-controlled  - abacavir 600 mG/dolutegravir 50 mG/lamiVUDine 300 mG 1 Tablet(s) Oral daily  - Follows with [facility] Guadalupe    #HTN  - metoprolol tartrate 25 milliGRAM(s) Oral two times a day    #GERD  - pantoprazole    Tablet 40 milliGRAM(s) Oral before breakfast    Spectra [id number]

## 2019-09-03 NOTE — CONSULT NOTE ADULT - ASSESSMENT
A/P: 48M pmhx DM/HIV/HLD with left mandibular pain of odontogenic origin (teeth #16, 17) d/t caries, less likely pericoronitis or razia's angina suggested by ID as ct neck was negative, airway patent/no respiratory distress, no suspicion for abscess, no EO/IO swelling. Currently afebrile. Admitted to CCU for STEMI and cardiac monitoring/work-up. Hemodynamically stable.     #Odontogenic pain (teeth #16, 17)    -No acute surgical intervention per OMFS  -ID recs noted, continue with antibiotics per ID   -Pain control  -Daily labs, monitor for leukocytosis  -Trend fever curve, supportive management  -Management of stemi as per ccu/cardiology  -When stable and discharged, please have pt call Dr. Shaver at [phone] to reschedule oral surgery appointment  -OMFS signing off, consult appreciated, please re-consult if pt condition worsens or swelling develops    D/w Dr. Andersen and CCU A/P: 48M pmhx DM/HIV/HLD with left mandibular pain of odontogenic origin (teeth #16, 17) d/t caries, less likely pericoronitis or razia's angina suggested by ID as ct neck was negative, airway patent/no respiratory distress, no suspicion for abscess, no EO/IO swelling. Currently afebrile. Admitted to CCU for STEMI and cardiac monitoring/work-up. Hemodynamically stable.     #Odontogenic pain (teeth #16, 17)    -No acute surgical intervention per OMFS  -ID recs noted, continue with antibiotics per ID   -Pain control  -Daily labs, monitor for leukocytosis  -Trend fever curve, supportive management  -Management of stemi as per ccu/cardiology  -When stable and discharged, please have pt call Dr. Shaver at [phone] to reschedule oral surgery appointment  -OMFS signing off, consult appreciated, please re-consult if pt condition worsens     D/w Dr. Andersen and CCU

## 2019-09-03 NOTE — PROGRESS NOTE ADULT - SUBJECTIVE AND OBJECTIVE BOX
BRISEIDA SIMON  48y, Male  Allergy: No Known Allergies      CHIEF COMPLAINT: Fevers and left-sided tooth/facial pain (02 Sep 2019 22:44)      INTERVAL EVENTS/HPI  - T(F): , Max: 101.6 (19 @ 18:00)  - CE downtrending   - Denies CP, but reports worsening L neck pain/edema, trismus and sore throat  - WBC Count: 5.44 K/uL (19 @ 04:33)      ROS  General: Denies rigors, nightsweats  HEENT: Denies headache, rhinorrhea, eye pain  CV: Denies CP, palpitations  PULM: Denies SOB, wheezing  GI: Denies hematemesis, hematochezia, melena  : Denies discharge, hematuria  MSK: Denies arthralgias, myalgias  SKIN: Denies rash, lesions  NEURO: Denies paresthesias, weakness  PSYCH: Denies depression, anxiety    VITALS:  T(F): 99.5, Max: 101.6 (19 @ 18:00)  HR: 84  BP: 124/67  RR: 13Vital Signs Last 24 Hrs  T(C): 37.5 (03 Sep 2019 04:00), Max: 38.7 (02 Sep 2019 18:00)  T(F): 99.5 (03 Sep 2019 04:00), Max: 101.6 (02 Sep 2019 18:00)  HR: 84 (03 Sep 2019 06:00) (84 - 96)  BP: 124/67 (03 Sep 2019 06:00) (104/63 - 158/92)  BP(mean): 81 (03 Sep 2019 06:00) (81 - 118)  RR: 13 (03 Sep 2019 06:00) (13 - 22)  SpO2: 97% (03 Sep 2019 06:00) (97% - 100%)    PHYSICAL EXAM:  Gen: NAD, resting in bed  HEENT: Normocephalic, atraumatic, L lower molar erythema  Neck: supple, no lymphadenopathy,  L neck induration in submandibular area, TTP, trismus  CV: Regular rate & regular rhythm  Lungs: CTAB  Abdomen: Soft, BS present  Ext: Warm, well perfused  Neuro: non focal, awake  Skin: no rash, no erythema  Lines: no phlebitis    FH: Non-contributory  Social Hx: Non-contributory    TESTS & MEASUREMENTS:                        12.7   5.44  )-----------( 85       ( 03 Sep 2019 04:33 )             37.6         134<L>  |  99  |  8<L>  ----------------------------<  151<H>  3.4<L>   |  22  |  0.7    Ca    8.3<L>      03 Sep 2019 04:33  Mg     1.9         TPro  6.7  /  Alb  4.3  /  TBili  0.7  /  DBili  x   /  AST  45<H>  /  ALT  26  /  AlkPhos  49      eGFR if Non African American: 112 mL/min/1.73M2 (19 @ 04:33)  eGFR if African American: 130 mL/min/1.73M2 (19 @ 04:33)    LIVER FUNCTIONS - ( 02 Sep 2019 05:44 )  Alb: 4.3 g/dL / Pro: 6.7 g/dL / ALK PHOS: 49 U/L / ALT: 26 U/L / AST: 45 U/L / GGT: x           Urinalysis Basic - ( 03 Sep 2019 00:00 )    Color: Yellow / Appearance: Clear / S.016 / pH: x  Gluc: x / Ketone: Negative  / Bili: Negative / Urobili: 3 mg/dL   Blood: x / Protein: 30 mg/dL / Nitrite: Negative   Leuk Esterase: Negative / RBC: 6 /HPF / WBC 1 /HPF   Sq Epi: x / Non Sq Epi: 0 /HPF / Bacteria: Negative        Culture - Blood (collected 19 @ 18:50)  Source: .Blood Blood  Preliminary Report (19 @ 23:01):    No growth to date.    Culture - Blood (collected 19 @ 18:50)  Source: .Blood Blood  Preliminary Report (19 @ 23:01):    No growth to date.        Lactate, Blood: 1.7 mmol/L (19 @ 18:50)      INFECTIOUS DISEASES TESTING      RADIOLOGY & ADDITIONAL TESTS:  I have personally reviewed the last Chest xray  CXR  Xray Chest 2 Views PA/Lat:   EXAM:  XR CHEST PA LAT 2V            PROCEDURE DATE:  2019            INTERPRETATION:  Clinical History / Reason for exam: Fever    Comparison : Chest radiograph 2014.    Technique/Positioning: PA and lateral.    Findings:    Support devices: None.    Cardiac/mediastinum/hilum: Unremarkable.    Lung parenchyma/Pleura: Within normal limits.    Skeleton/soft tissues: Unremarkable.    Impression:      No radiographic evidence of acute cardiopulmonary disease.                      HARMONY MOISE M.D., ATTENDING RADIOLOGIST  This document has been electronically signed. Sep  2 2019 12:38PM             (19 @ 19:12)      CT      CARDIOLOGY TESTING  12 Lead ECG:   Ventricular Rate 89 BPM    Atrial Rate 89 BPM    P-R Interval 160 ms    QRS Duration 110 ms    Q-T Interval 342 ms    QTC Calculation(Bezet) 416 ms    P Axis 40 degrees    R Axis 83 degrees    T Axis 18 degrees    Diagnosis Line Normal sinus rhythm  Anteroseptal infarct , age undetermined  Abnormal ECG    Confirmed by SEKOU JULES MD ([id number]) on 2019 9:34:58 AM (19 @ 09:01)  12 Lead ECG:   Ventricular Rate 91 BPM    Atrial Rate 91 BPM    P-R Interval 164 ms    QRS Duration 110 ms    Q-T Interval 352 ms    QTC Calculation(Bezet) 432 ms    P Axis 32 degrees    R Axis 90 degrees    T Axis 39 degrees    Diagnosis Line Normal sinus rhythm  Rightward axis  Anteroseptal infarct , possibly acute  ** ** ACUTE MI / STEMI ** **  Abnormal ECG    Confirmed by Len Foote ([id number]) on 2019 7:48:21 AM (19 @ 02:24)      MEDICATIONS  abacavir 600 mG/dolutegravir 50 mG/lamiVUDine 300 mG 1  ampicillin/sulbactam  IVPB 3  ampicillin/sulbactam  IVPB   aspirin enteric coated 81  atorvastatin 40  chlorhexidine 4% Liquid 1  clindamycin IVPB   clindamycin IVPB 900  clopidogrel Tablet 75  heparin  Infusion 1350  metoprolol tartrate 25  pantoprazole    Tablet 40      ANTIBIOTICS:  abacavir 600 mG/dolutegravir 50 mG/lamiVUDine 300 mG 1 Tablet(s) Oral daily  ampicillin/sulbactam  IVPB 3 Gram(s) IV Intermittent every 6 hours  ampicillin/sulbactam  IVPB      clindamycin IVPB      clindamycin IVPB 900 milliGRAM(s) IV Intermittent every 8 hours      All available historical records have been reviewed

## 2019-09-03 NOTE — CHART NOTE - NSCHARTNOTEFT_GEN_A_CORE
CCU DOWNGRADE NOTE:    48y Male transferred to floor from CCU    Patient is a 48y old Male who presents with a chief complaint of Fevers and left-sided tooth/facial pain (03 Sep 2019 08:07)    The patient is currently admitted for the primary diagnosis of Fever    The patient was admitted to the unit for 1 Days.    The patient was never intubated, and never on pressors.     Indwelling vascular catheters: None    Urinary Catheter: None     Disposition: Home    Code Status: Full    CCU COURSE OF EVENTS:  47 yo M with HIV on ART (0 VL), DM, DLD, recent cavity filling 8/26, presents to the ED for worsened left lower molar pain for 1 week, found to have ST elevations V2-3 and elevated CE, fever. Workup ischemic vs focal myocarditis. CE now downtrending. Pt denies any chest pain. Unclear if tooth pain is a manifestation of cardiac issues as occurred one week prior and pt was diagnosed with a true tooth pathology s/p cavity filling 8/26. Per cardiologist Dr. Griffin, patient had a similar EKG about a year ago, had an ECHO and stress test wnl. Cardiac cath not needed per Dr. Griffin and can be downgraded to telemetry unit. ECHO today EF 40-45%, mildly to moderately decreased global left ventricular systolic function, multiple left ventricular regional wall motion abnormalities exist, and ischemic cardiomyopathy.      Current workup in progress:    #STEMI V2-V3 with elevated CE  - Cardiology following Dr. Griffin, no cath as of now   - Trend EKG and Shaneka  - clopidogrel Tablet 75 milliGRAM(s) Oral daily  - heparin  Infusion 1350 Unit(s)/Hr (13.5 mL/Hr) IV Continuous <Continuous>  - aspirin enteric coated 81 milliGRAM(s) Oral daily  - Check Coxsackie Abs- pending    #Tooth ache with L-neck swelling. Fevers. CT neck wnl, focal air lower molar likely 2/2 procedure. Doubt Lemierre's, but on differential.   - f/u BCX  - Continue ampicillin/sulbactam  IVPB 3 Gram(s) IV Intermittent every 6 hours  - D/C Clinda  - ENT consult as worsened L neck swelling   - Appreciate OMFS consult  - May need repeat CT neck   - When stable and discharged, please have pt call Dr. Shaver at [phone] to reschedule oral surgery appointment    #HLD  - Discuss need for high dose statin with Cards, on atorvastatin 40 milliGRAM(s) Oral at bedtime    #Asymptomatic HIV 5/2019 CD4 670, VL UD. Well-controlled  - abacavir 600 mG/dolutegravir 50 mG/lamiVUDine 300 mG 1 Tablet(s) Oral daily  - Follows with [facility] Guadalupe    #HTN  - metoprolol tartrate 25 milliGRAM(s) Oral two times a day    #GERD  - pantoprazole    Tablet 40 milliGRAM(s) Oral before breakfast

## 2019-09-03 NOTE — CONSULT NOTE ADULT - SUBJECTIVE AND OBJECTIVE BOX
Patient was seen and examined on morning rounds. No acute events overnight. Yesterday pt was upgraded to CCU for continued cardiac monitoring/work-up. Pt complaints of left wisdom tooth pain that travels to left side of head, relieved with analgesics. Denies difficulty breathing, dysphagia, swelling, chest pain, shortness of breath. Said he slept well overnight. ID consult recommendations noted, suspect razia's angina and to c/w IV abx. Otherwise patient is subjectively feeling better and reports fever to be resolved.     PE:  vitals reviewed. Non-tachycardic, normotensive. Tmax 101.6F, Tcurrent 99.5F  in no acute distress  breathing RA unlabored  no extraoral facial swelling, neck soft b/l. soft tissue lipomatosis b/l neck/chin. non-erythematous   intraorally, mild trismus present (pt reports this is normal to him), ALESSIO > 25 mm. Calculus present by left mandibular molars. Teeth #16 and #17 left upper and lower wisdom teeth, carious, tender to percussion/palpation. Gingiva and soft tissue and buccal vestibule no ttp, non-edematous. No i/o swelling. Floor of mouth is soft, unraised. Tongue has full range of motion. Mihir-pharyngeal space clear.     Labs:  no leukocytosis, wbc 5.44  bmp 134/3.4/99/22/8/0.7<151  ckmb 6.5  trop 0.45>0.49>0.36>0.34  UA negative    Imaging:  no new imaging

## 2019-09-03 NOTE — CONSULT NOTE ADULT - ASSESSMENT
48 y.o male with odontogenic pain (#17) and worsening L neck swelling.    ·	OMFS evaluated pt, OP f/u when cleared for extraction  ·	cont IV abx  ·	consider adding decadron for symptomatic relief  ·	will discuss US vs rpt CT  ·	warm compress to area  ·	w/d with attng 48 y.o male with odontogenic pain (#17) and worsening L neck swelling.    ·	OMFS evaluated pt, OP f/u when cleared for extraction  ·	cont IV abx  ·	consider adding decadron for symptomatic relief  ·	d/w Dr Garcia -> would rec to rpt CT neck with contrast to r/o developing abscess as neck findings are new since previous scan  ·	warm compress to area  ·	w/d with attng

## 2019-09-03 NOTE — CONSULT NOTE ADULT - SUBJECTIVE AND OBJECTIVE BOX
PT is a 48 y.o male admitted with fevers, pain and elevated CE - called for worsening swelling on the L neck. PT states he had a dental filling done on his left lower side last week. Pt felt immediate pain after the lidocaine wore off - returned to the dentist the next day and was told his pain was related to his wisdom tooth. PT was recommended to see Dr Shaver, saw later last week - also told his pain was related to his wisdom tooth, was planning for extraction today in the office. PT wound up coming to the ED for fevers at home (pt states 105 was highest) and inability to tolerate PO. PT was noted to have elevated cardiac enzymes in ED as well. PT states the pain and swelling have gotten worse since he has been admitted, feels like he cannot open his mouth fully. PT has not been able to tolerate much PO since being admitted, only soft/liquid (jello). Pt denies any SOB/ diff breathing.     PAST MEDICAL & SURGICAL HISTORY:  Diabetes  Hypercholesteremia  HIV (human immunodeficiency virus infection)  History of appendectomy  MEDICATIONS  (STANDING):  abacavir 600 mG/dolutegravir 50 mG/lamiVUDine 300 mG 1 Tablet(s) Oral daily  ampicillin/sulbactam  IVPB 3 Gram(s) IV Intermittent every 6 hours  aspirin enteric coated 81 milliGRAM(s) Oral daily  atorvastatin 40 milliGRAM(s) Oral at bedtime  chlorhexidine 4% Liquid 1 Application(s) Topical <User Schedule>  clopidogrel Tablet 75 milliGRAM(s) Oral daily  heparin  Infusion 1350 Unit(s)/Hr (13.5 mL/Hr) IV Continuous <Continuous>  metoprolol tartrate 25 milliGRAM(s) Oral two times a day  pantoprazole    Tablet 40 milliGRAM(s) Oral before breakfast  Allergies    No Known Allergies    Intolerances    Vital Signs Last 24 Hrs  T(C): 36.7 (03 Sep 2019 12:00), Max: 38.7 (02 Sep 2019 18:00)  T(F): 98 (03 Sep 2019 12:00), Max: 101.6 (02 Sep 2019 18:00)  HR: 76 (03 Sep 2019 12:00) (76 - 92)  BP: 106/62 (03 Sep 2019 12:00) (104/63 - 150/93)  BP(mean): 70 (03 Sep 2019 12:00) (70 - 113)  RR: 18 (03 Sep 2019 12:00) (13 - 22)  SpO2: 98% (03 Sep 2019 08:00) (97% - 100%)    GEN: NAD, awake and alert. no drooling or pooling of secretions. good vocal quality. no increased respiratory effort.  HEENT: + mild edema noted to L submandibular space. no erythema. + TTP over area, + firm area, approx 2 cm - no induration or fluctuance noted. no open areas of drainage. no TTP or edematous areas over the R submandibular space or submental space. + trismus noted, mild. oral mucosa pink, no erythema/edema to post pharynx. uvula midline. no FOMT or edema. + TTP over # 17, no georges pus or drainage noted. FROM of neck, + discomfort with turning to L.    LABS:                          12.7   5.44  )-----------( 85       ( 03 Sep 2019 04:33 )             37.6     134<L>  |  99  |  8<L>  ----------------------------<  151<H>  3.4<L>   |  22  |  0.7    Ca    8.3<L>      03 Sep 2019 04:33  Mg     1.9     09-03    TPro  6.7  /  Alb  4.3  /  TBili  0.7  /  DBili  x   /  AST  45<H>  /  ALT  26  /  AlkPhos  49  09-02    RADIOLOGY:    EXAM:  CT NECK SOFT TISSUE IC        PROCEDURE DATE:  09/01/2019    INTERPRETATION:  Clinical History / Reason for exam: Left-sided neck and   jaw pain. Fever.    Technique: Multiple contiguous axial images were obtained of the soft   tissues of the neck from the superior aspect of the frontal sinuses   through to the dayanara after the intravenous administration of  75 cc of   Optiray 320.    Comparison: None.    Findings: Visualized portions of the brain demonstrate no abnormal   enhancing masses or lesions. The paranasal sinuses are clear. The   bilateral mastoid air complexes are clear.      Globes and lenses are intact.  Bilateral optic nerves and extraocular   muscles are symmetric and within normal limits.    Bilateral parotid, submandibular, and sublingual glands are symmetric and   enhance homogeneously.    The adenoidal soft tissues, lingual tonsils and palatine tonsils are   grossly unremarkable.    The soft tissues of the oral cavity are obscured by metallic streaking   artifact.  Soft tissues of the floor of the mouth and tongue base are   symmetric and within normal limits.      The pre-epiglottic space is clear.  Bilateral aryepiglottic folds are   within normal limits bilateral pyriform sinuses are patent.The true and   false vocal cords are within normal limits.     The thyroid gland is symmetric and enhances homogeneously.  The great   vessels of the neck are unremarkable. No cervical lymphadenopathy.   Straightening of the normal curvature of the cervical spine.    Right apical calcified granuloma. The upper heart and mediastinum are   within normal limits. Main pulmonary artery measuring at the upper limits   of normal at 2.9 cm (5/269).    Few locules of air adjacent to the left molar likely secondary to recent   dental procedure.    Impression:     Normal CT scan of the soft tissues of the neck with contrast.      BLANCA BAUER M.D., RESIDENT RADIOLOGIST  This document has been electronically signed.  LAURA GOODEN M.D., ATTENDING RADIOLOGIST  This document has been electronically signed. Sep  2 2019 11:59AM

## 2019-09-04 LAB
ALBUMIN SERPL ELPH-MCNC: 3.7 G/DL — SIGNIFICANT CHANGE UP (ref 3.5–5.2)
ALP SERPL-CCNC: 42 U/L — SIGNIFICANT CHANGE UP (ref 30–115)
ALT FLD-CCNC: 37 U/L — SIGNIFICANT CHANGE UP (ref 0–41)
ANION GAP SERPL CALC-SCNC: 16 MMOL/L — HIGH (ref 7–14)
APTT BLD: 64.7 SEC — HIGH (ref 27–39.2)
AST SERPL-CCNC: 28 U/L — SIGNIFICANT CHANGE UP (ref 0–41)
BASOPHILS # BLD AUTO: 0.02 K/UL — SIGNIFICANT CHANGE UP (ref 0–0.2)
BASOPHILS NFR BLD AUTO: 0.5 % — SIGNIFICANT CHANGE UP (ref 0–1)
BILIRUB SERPL-MCNC: 0.7 MG/DL — SIGNIFICANT CHANGE UP (ref 0.2–1.2)
BUN SERPL-MCNC: 9 MG/DL — LOW (ref 10–20)
CALCIUM SERPL-MCNC: 8.5 MG/DL — SIGNIFICANT CHANGE UP (ref 8.5–10.1)
CHLORIDE SERPL-SCNC: 101 MMOL/L — SIGNIFICANT CHANGE UP (ref 98–110)
CK MB CFR SERPL CALC: 4.2 NG/ML — SIGNIFICANT CHANGE UP (ref 0.6–6.3)
CK SERPL-CCNC: 120 U/L — SIGNIFICANT CHANGE UP (ref 0–225)
CO2 SERPL-SCNC: 21 MMOL/L — SIGNIFICANT CHANGE UP (ref 17–32)
CREAT SERPL-MCNC: 0.7 MG/DL — SIGNIFICANT CHANGE UP (ref 0.7–1.5)
CULTURE RESULTS: NO GROWTH — SIGNIFICANT CHANGE UP
EOSINOPHIL # BLD AUTO: 0.14 K/UL — SIGNIFICANT CHANGE UP (ref 0–0.7)
EOSINOPHIL NFR BLD AUTO: 3.2 % — SIGNIFICANT CHANGE UP (ref 0–8)
GLUCOSE BLDC GLUCOMTR-MCNC: 134 MG/DL — HIGH (ref 70–99)
GLUCOSE BLDC GLUCOMTR-MCNC: 143 MG/DL — HIGH (ref 70–99)
GLUCOSE BLDC GLUCOMTR-MCNC: 145 MG/DL — HIGH (ref 70–99)
GLUCOSE SERPL-MCNC: 141 MG/DL — HIGH (ref 70–99)
HCT VFR BLD CALC: 35.5 % — LOW (ref 42–52)
HGB BLD-MCNC: 12.1 G/DL — LOW (ref 14–18)
IMM GRANULOCYTES NFR BLD AUTO: 0.2 % — SIGNIFICANT CHANGE UP (ref 0.1–0.3)
INR BLD: 1.21 RATIO — SIGNIFICANT CHANGE UP (ref 0.65–1.3)
LYMPHOCYTES # BLD AUTO: 1.57 K/UL — SIGNIFICANT CHANGE UP (ref 1.2–3.4)
LYMPHOCYTES # BLD AUTO: 36.3 % — SIGNIFICANT CHANGE UP (ref 20.5–51.1)
MAGNESIUM SERPL-MCNC: 2 MG/DL — SIGNIFICANT CHANGE UP (ref 1.8–2.4)
MCHC RBC-ENTMCNC: 29.2 PG — SIGNIFICANT CHANGE UP (ref 27–31)
MCHC RBC-ENTMCNC: 34.1 G/DL — SIGNIFICANT CHANGE UP (ref 32–37)
MCV RBC AUTO: 85.5 FL — SIGNIFICANT CHANGE UP (ref 80–94)
MONOCYTES # BLD AUTO: 0.48 K/UL — SIGNIFICANT CHANGE UP (ref 0.1–0.6)
MONOCYTES NFR BLD AUTO: 11.1 % — HIGH (ref 1.7–9.3)
NEUTROPHILS # BLD AUTO: 2.1 K/UL — SIGNIFICANT CHANGE UP (ref 1.4–6.5)
NEUTROPHILS NFR BLD AUTO: 48.7 % — SIGNIFICANT CHANGE UP (ref 42.2–75.2)
NRBC # BLD: 0 /100 WBCS — SIGNIFICANT CHANGE UP (ref 0–0)
PHOSPHATE SERPL-MCNC: 3.7 MG/DL — SIGNIFICANT CHANGE UP (ref 2.1–4.9)
PLATELET # BLD AUTO: 100 K/UL — LOW (ref 130–400)
POTASSIUM SERPL-MCNC: 3.7 MMOL/L — SIGNIFICANT CHANGE UP (ref 3.5–5)
POTASSIUM SERPL-SCNC: 3.7 MMOL/L — SIGNIFICANT CHANGE UP (ref 3.5–5)
PROT SERPL-MCNC: 6.3 G/DL — SIGNIFICANT CHANGE UP (ref 6–8)
PROTHROM AB SERPL-ACNC: 13.9 SEC — HIGH (ref 9.95–12.87)
RBC # BLD: 4.15 M/UL — LOW (ref 4.7–6.1)
RBC # FLD: 12.6 % — SIGNIFICANT CHANGE UP (ref 11.5–14.5)
SODIUM SERPL-SCNC: 138 MMOL/L — SIGNIFICANT CHANGE UP (ref 135–146)
SPECIMEN SOURCE: SIGNIFICANT CHANGE UP
TROPONIN T SERPL-MCNC: 0.49 NG/ML — CRITICAL HIGH
WBC # BLD: 4.32 K/UL — LOW (ref 4.8–10.8)
WBC # FLD AUTO: 4.32 K/UL — LOW (ref 4.8–10.8)

## 2019-09-04 PROCEDURE — 70491 CT SOFT TISSUE NECK W/DYE: CPT | Mod: 26

## 2019-09-04 PROCEDURE — 99232 SBSQ HOSP IP/OBS MODERATE 35: CPT

## 2019-09-04 PROCEDURE — 93010 ELECTROCARDIOGRAM REPORT: CPT

## 2019-09-04 RX ORDER — SODIUM CHLORIDE 9 MG/ML
1000 INJECTION INTRAMUSCULAR; INTRAVENOUS; SUBCUTANEOUS
Refills: 0 | Status: DISCONTINUED | OUTPATIENT
Start: 2019-09-04 | End: 2019-09-07

## 2019-09-04 RX ADMIN — TRAMADOL HYDROCHLORIDE 50 MILLIGRAM(S): 50 TABLET ORAL at 14:37

## 2019-09-04 RX ADMIN — AMPICILLIN SODIUM AND SULBACTAM SODIUM 200 GRAM(S): 250; 125 INJECTION, POWDER, FOR SUSPENSION INTRAMUSCULAR; INTRAVENOUS at 12:38

## 2019-09-04 RX ADMIN — HEPARIN SODIUM 14.5 UNIT(S)/HR: 5000 INJECTION INTRAVENOUS; SUBCUTANEOUS at 12:37

## 2019-09-04 RX ADMIN — TRAMADOL HYDROCHLORIDE 50 MILLIGRAM(S): 50 TABLET ORAL at 15:07

## 2019-09-04 RX ADMIN — PANTOPRAZOLE SODIUM 40 MILLIGRAM(S): 20 TABLET, DELAYED RELEASE ORAL at 06:17

## 2019-09-04 RX ADMIN — SODIUM CHLORIDE 75 MILLILITER(S): 9 INJECTION INTRAMUSCULAR; INTRAVENOUS; SUBCUTANEOUS at 18:29

## 2019-09-04 RX ADMIN — AMPICILLIN SODIUM AND SULBACTAM SODIUM 200 GRAM(S): 250; 125 INJECTION, POWDER, FOR SUSPENSION INTRAMUSCULAR; INTRAVENOUS at 23:11

## 2019-09-04 RX ADMIN — AMPICILLIN SODIUM AND SULBACTAM SODIUM 200 GRAM(S): 250; 125 INJECTION, POWDER, FOR SUSPENSION INTRAMUSCULAR; INTRAVENOUS at 18:32

## 2019-09-04 RX ADMIN — AMPICILLIN SODIUM AND SULBACTAM SODIUM 200 GRAM(S): 250; 125 INJECTION, POWDER, FOR SUSPENSION INTRAMUSCULAR; INTRAVENOUS at 05:40

## 2019-09-04 RX ADMIN — CLOPIDOGREL BISULFATE 75 MILLIGRAM(S): 75 TABLET, FILM COATED ORAL at 12:38

## 2019-09-04 RX ADMIN — Medication 25 MILLIGRAM(S): at 18:29

## 2019-09-04 RX ADMIN — Medication 81 MILLIGRAM(S): at 12:38

## 2019-09-04 RX ADMIN — ATORVASTATIN CALCIUM 40 MILLIGRAM(S): 80 TABLET, FILM COATED ORAL at 23:11

## 2019-09-04 RX ADMIN — Medication 25 MILLIGRAM(S): at 05:41

## 2019-09-04 NOTE — PROGRESS NOTE ADULT - ASSESSMENT
49 y/o M with PMH of HIV (on treatment), DM, DLD, presents to the ED for left tooth pain for 1 week. Code STEMI called in ED for ST elevation and positive cardiac enzymes.     #STEMI V2-V3 with elevated CE  - Cardiology following Dr. Griffin, no cath as of now   - Trend EKG and Shaneka - still elevated.  - clopidogrel Tablet 75 milliGRAM(s) Oral daily  - heparin 1350 units. Follow PTT  - aspirin enteric coated 81 milliGRAM(s) Oral daily  - Check Coxsackie Abs- pending  - Echo showed: EF 40-45%, ischemic CM, and wall motion abnormalities    #Toothache with L-neck swelling. Post procedural (likely) vs. Lemierre's (doubt)  - Fevers. CT neck wnl, focal air lower molar likely 2/2 procedure  - f/u BCX  - Continue ampicillin/sulbactam  IVPB 3 Gram(s) IV Intermittent every 6 hours  - ENT consult as worsened L neck swelling - recommending repeat of CT neck and soft tissues, F/U  - Appreciate OMFS consult: no intervention at this time  - When stable and discharged, please have pt call Dr. Shaver at [phone] to reschedule oral surgery appointment  - HIV positive    #HLD  - Discuss need for high dose statin with Cards, on atorvastatin 40 milliGRAM(s) Oral at bedtime    #Asymptomatic HIV 5/2019 CD4 670, VL UD. Well-controlled  - abacavir 600 mG/dolutegravir 50 mG/lamiVUDine 300 mG 1 Tablet(s) Oral daily  - Follows with [facility] Gu[facility]    #HTN  - metoprolol tartrate 25 milliGRAM(s) Oral two times a day    #GERD  - pantoprazole    Tablet 40 milliGRAM(s) Oral before breakfast.    dvt ppx: lovenox  ambulate as tolerated  dispo: from home  FULL CODE 47 y/o M with PMH of HIV (on treatment), DM, DLD, presents to the ED for left tooth pain for 1 week. Code STEMI called in ED for ST elevation and positive cardiac enzymes.     #STEMI V2-V3 with elevated CE  - Cardiology following Dr. Griffin - cath tomorrow   - Trend EKG and Shaneka - still elevated.  - clopidogrel Tablet 75 milliGRAM(s) Oral daily  - heparin 1350 units. Follow PTT  - aspirin enteric coated 81 milliGRAM(s) Oral daily  - Check Coxsackie Abs- pending  - Echo showed: EF 40-45%, ischemic CM, and wall motion abnormalities    #Toothache with L-neck swelling. Post procedural (likely) vs. Lemierre's (doubt)  - Fevers. CT neck wnl, focal air lower molar likely 2/2 procedure  - f/u BCX  - Continue ampicillin/sulbactam  IVPB 3 Gram(s) IV Intermittent every 6 hours  - ENT consult as worsened L neck swelling - recommending repeat of CT neck and soft tissues, F/U  - Warm compress to site  - Appreciate OMFS consult: no intervention at this time  - When stable and discharged, please have pt call Dr. Shaver at [phone] to reschedule oral surgery appointment  - HIV positive    #HLD  - Discuss need for high dose statin with Cards, on atorvastatin 40 milliGRAM(s) Oral at bedtime    #Asymptomatic HIV 5/2019 CD4 670, VL UD. Well-controlled  - abacavir 600 mG/dolutegravir 50 mG/lamiVUDine 300 mG 1 Tablet(s) Oral daily  - Follows with Jewels Guadalupe    #HTN  - metoprolol tartrate 25 milliGRAM(s) Oral two times a day    #GERD  - pantoprazole    Tablet 40 milliGRAM(s) Oral before breakfast.    dvt ppx: lovenox  ambulate as tolerated  dispo: from home  FULL CODE

## 2019-09-04 NOTE — PROGRESS NOTE ADULT - SUBJECTIVE AND OBJECTIVE BOX
BRISEIDA SIMON  48y, Male  Allergy: No Known Allergies      CHIEF COMPLAINT: Fevers and left-sided tooth/facial pain (04 Sep 2019 11:11)      INTERVAL EVENTS/HPI  - No acute events overnight, continued L neck swelling  - T(F): , Max: 97.9 (19 @ 20:49) afebrile   - Denies any worsening symptoms  - Tolerating medication  - WBC Count: 4.32 K/uL (19 @ 06:00)      ROS  General: Denies rigors, nightsweats  HEENT: Denies headache, rhinorrhea, sore throat, eye pain  CV: Denies CP, palpitations  PULM: Denies SOB, wheezing  GI: Denies hematemesis, hematochezia, melena  : Denies discharge, hematuria  MSK: Denies arthralgias, myalgias  SKIN: Denies rash, lesions  NEURO: Denies paresthesias, weakness  PSYCH: Denies depression, anxiety    VITALS:  T(F): 97, Max: 97.9 (19 @ 20:49)  HR: 69  BP: 125/61  RR: 17Vital Signs Last 24 Hrs  T(C): 36.1 (04 Sep 2019 05:38), Max: 36.6 (03 Sep 2019 20:49)  T(F): 97 (04 Sep 2019 05:38), Max: 97.9 (03 Sep 2019 20:49)  HR: 69 (04 Sep 2019 07:55) (69 - 80)  BP: 125/61 (04 Sep 2019 05:38) (93/52 - 125/61)  BP(mean): --  RR: 17 (04 Sep 2019 07:55) (16 - 18)  SpO2: 98% (04 Sep 2019 07:55) (98% - 98%)    PHYSICAL EXAM:  Gen: NAD, resting in bed  HEENT: Normocephalic, atraumatic, L lower molar erythema  Neck: supple, no lymphadenopathy,  L neck induration in anterior cervical area, TTP, trismus  CV: Regular rate & regular rhythm  Lungs: CTAB  Abdomen: Soft, BS present  Ext: Warm, well perfused  Neuro: non focal, awake  Skin: no rash, no erythema  Lines: no phlebitis      FH: Non-contributory  Social Hx: Non-contributory    TESTS & MEASUREMENTS:                        12.1   4.32  )-----------( 100      ( 04 Sep 2019 06:00 )             35.5         138  |  101  |  9<L>  ----------------------------<  141<H>  3.7   |  21  |  0.7    Ca    8.5      04 Sep 2019 06:00  Phos  3.7       Mg     2.0         TPro  6.3  /  Alb  3.7  /  TBili  0.7  /  DBili  x   /  AST  28  /  ALT  37  /  AlkPhos  42      eGFR if Non African American: 112 mL/min/1.73M2 (19 @ 06:00)  eGFR if African American: 130 mL/min/1.73M2 (19 @ 06:00)    LIVER FUNCTIONS - ( 04 Sep 2019 06:00 )  Alb: 3.7 g/dL / Pro: 6.3 g/dL / ALK PHOS: 42 U/L / ALT: 37 U/L / AST: 28 U/L / GGT: x           Urinalysis Basic - ( 03 Sep 2019 00:00 )    Color: Yellow / Appearance: Clear / S.016 / pH: x  Gluc: x / Ketone: Negative  / Bili: Negative / Urobili: 3 mg/dL   Blood: x / Protein: 30 mg/dL / Nitrite: Negative   Leuk Esterase: Negative / RBC: 6 /HPF / WBC 1 /HPF   Sq Epi: x / Non Sq Epi: 0 /HPF / Bacteria: Negative        Culture - Urine (collected 19 @ 00:00)  Source: .Urine Clean Catch (Midstream)  Final Report (19 @ 10:59):    No growth    Culture - Blood (collected 19 @ 05:44)  Source: .Blood None  Preliminary Report (19 @ 11:13):    No growth to date.    Culture - Blood (collected 19 @ 18:50)  Source: .Blood Blood  Preliminary Report (19 @ 23:01):    No growth to date.    Culture - Blood (collected 19 @ 18:50)  Source: .Blood Blood  Preliminary Report (19 @ 23:01):    No growth to date.        Lactate, Blood: 1.7 mmol/L (19 @ 18:50)      INFECTIOUS DISEASES TESTING  HIV-1/2 Combo Result: Reactive (19 @ 05:44)      RADIOLOGY & ADDITIONAL TESTS:  I have personally reviewed the last Chest xray  CXR  Xray Chest 2 Views PA/Lat:   EXAM:  XR CHEST PA LAT 2V            PROCEDURE DATE:  2019            INTERPRETATION:  Clinical History / Reason for exam: Fever    Comparison : Chest radiograph 2014.    Technique/Positioning: PA and lateral.    Findings:    Support devices: None.    Cardiac/mediastinum/hilum: Unremarkable.    Lung parenchyma/Pleura: Within normal limits.    Skeleton/soft tissues: Unremarkable.    Impression:      No radiographic evidence of acute cardiopulmonary disease.                      HARMONY MOISE M.D., ATTENDING RADIOLOGIST  This document has been electronically signed. Sep  2 2019 12:38PM             (19 @ 19:12)      CT      CARDIOLOGY TESTING  12 Lead ECG:   Ventricular Rate 69 BPM    Atrial Rate 69 BPM    P-R Interval 180 ms    QRS Duration 108 ms    Q-T Interval 408 ms    QTC Calculation(Bezet) 437 ms    P Axis 32 degrees    R Axis 66 degrees    T Axis 21 degrees    Diagnosis Line Sinus rhythm with occasional Premature ventricular complexes  Anteroseptal infarct , age undetermined  Abnormal ECG    Confirmed by Chin Liao ([id number]) on 2019 8:36:13 AM (19 @ 07:40)  12 Lead ECG:   Ventricular Rate 83 BPM    Atrial Rate 83 BPM    P-R Interval 164 ms    QRS Duration 112 ms    Q-T Interval 366 ms    QTC Calculation(Bezet) 430 ms    P Axis 34 degrees    R Axis 96 degrees    T Axis -11 degrees    Diagnosis Line Normal sinus rhythm  Possible Left atrial enlargement  Rightward axis  Anteroseptal infarct , age undetermined  Abnormal ECG    Confirmed by MATT WAYNE MD [id number]) on 9/3/2019 8:04:02 AM (19 @ 07:33)      MEDICATIONS  abacavir 600 mG/dolutegravir 50 mG/lamiVUDine 300 mG 1  ampicillin/sulbactam  IVPB 3  ampicillin/sulbactam  IVPB   aspirin enteric coated 81  atorvastatin 40  chlorhexidine 4% Liquid 1  clopidogrel Tablet 75  heparin  Infusion 1450  metoprolol tartrate 25  pantoprazole    Tablet 40      ANTIBIOTICS:  abacavir 600 mG/dolutegravir 50 mG/lamiVUDine 300 mG 1 Tablet(s) Oral daily  ampicillin/sulbactam  IVPB 3 Gram(s) IV Intermittent every 6 hours  ampicillin/sulbactam  IVPB          All available historical records have been reviewed

## 2019-09-04 NOTE — PROGRESS NOTE ADULT - SUBJECTIVE AND OBJECTIVE BOX
Patient was seen and examined by me on 3C.    He appears comfortable in bed.  He denies any chest pain, shortness of breath and jaw pain.  No fever for >48 hrs.    Vitals:  T(C): 36.1 (09-04-19 @ 05:38), Max: 38.7 (09-02-19 @ 18:00)  HR: 69 (09-04-19 @ 07:55) (69 - 96)  BP: 125/61 (09-04-19 @ 05:38) (93/52 - 158/92)  RR: 17 (09-04-19 @ 07:55) (13 - 22)  SpO2: 98% (09-04-19 @ 07:55) (97% - 100%)    Telemetry: Sinus Rhythm    LABS:                        12.1   4.32  )-----------( 100      ( 04 Sep 2019 06:00 )             35.5     09-04    138  |  101  |  9<L>  ----------------------------<  141<H>  3.7   |  21  |  0.7    Ca    8.5      04 Sep 2019 06:00  Phos  3.7     09-04  Mg     2.0     09-04    TPro  6.3  /  Alb  3.7  /  TBili  0.7  /  DBili  x   /  AST  28  /  ALT  37  /  AlkPhos  42  09-04    PT/INR - ( 04 Sep 2019 06:00 )   PT: 13.90 sec;   INR: 1.21 ratio         PTT - ( 04 Sep 2019 06:00 )  PTT:64.7 sec  MEDICATIONS  (STANDING):  abacavir 600 mG/dolutegravir 50 mG/lamiVUDine 300 mG 1 Tablet(s) Oral daily  ampicillin/sulbactam  IVPB 3 Gram(s) IV Intermittent every 6 hours  ampicillin/sulbactam  IVPB      aspirin enteric coated 81 milliGRAM(s) Oral daily  atorvastatin 40 milliGRAM(s) Oral at bedtime  chlorhexidine 4% Liquid 1 Application(s) Topical <User Schedule>  clopidogrel Tablet 75 milliGRAM(s) Oral daily  heparin  Infusion 1450 Unit(s)/Hr (14.5 mL/Hr) IV Continuous <Continuous>  metoprolol tartrate 25 milliGRAM(s) Oral two times a day  pantoprazole    Tablet 40 milliGRAM(s) Oral before breakfast    MEDICATIONS  (PRN):  acetaminophen   Tablet .. 650 milliGRAM(s) Oral every 6 hours PRN Temp greater or equal to 38C (100.4F)  acetaminophen   Tablet .. 650 milliGRAM(s) Oral every 6 hours PRN Mild Pain (1 - 3)  traMADol 50 milliGRAM(s) Oral three times a day PRN Moderate Pain (4 - 6)      PHYSICAL EXAM:  Constitutional: appears stated age, well developed/nourished, no acute distress  Eyes: EOM's intact.  PERRLA  ENMT: Normocephalic, atraumatic..  Neck: Jugular veins non-distended; no carotid bruits bilaterally.  Respiratory: respiratory pattern unlabored; no dullness to percussion; lungs clear to auscultation bilaterally.  Cardiovascular: Regular rhythm.  S1 and S2 normal.  No murmur nor rub appreciated.  Abdomen: Soft, non-tender.  Normal bowel sounds.  Extremities: extremities warm; no edema.  Pulses: Intact bilaterally  Skin: No gross abnormalities noted.  Musculoskeletal: No gross deformities  Neurological: Alert, oriented x 3.  No focal neurologic deficits noted.    Echo  < from: Transthoracic Echocardiogram (09.02.19 @ 13:49) >  Summary:   1. Left ventricular ejection fraction, by visual estimation, is 40 to   45%.   2. Mildly to moderately decreased global left ventricular systolic   function.   3. Multiple left ventricular regional wall motion abnormalities exist.   See wall motion findings.   4. Ischemic cardiomyopathy.   5. LA volume Index is 16.0 ml/m² ml/m2.    < end of copied text >

## 2019-09-04 NOTE — PROGRESS NOTE ADULT - ASSESSMENT
1] ACS with atypical anginal symptom (Jaw Pain) - NSTEMI (Not STEMI)      LVEF 40-45%  - Results of serial cardiac enzymes highly indicative of recent ACS - NSTEMI.  His ECG is the same from almost a year ago.    - Echo in office in 11/2018 showed normal left ventricular systolic function.  Echo during this admission showed decreased LVEF and segmental wall motion abnormalities  - I am recommending a cardiac catheterization to definitively rule out any occlusive CAD.  Risks, benefits and alternatives of cardiac cath discussed with the patient.  Risks include but not limited to infection, vascular complication, bleeding, SENDY leading to hemodialysis, CVA and MI.  He understands and agrees to have procedure.  He is scheduled for 7 am (first case) on Sep 5, 2019.  - Continue DAPT  - No ARB or ACEI for now.  Will decide post cardiac cath.    2] Hyperlipidemia  - Continue medications    3] Type II DM  - Glycemic control    4] HIV  - stable    Plan discussed with House Staff during rounds    Remington Griffin MD  [phone] Office

## 2019-09-04 NOTE — CHART NOTE - NSCHARTNOTEFT_GEN_A_CORE
I eviewed patient's CT images and examined patient at bedside. No increase in swelling seen. awaiting official CT read.   Continue antibiotics.

## 2019-09-04 NOTE — PROGRESS NOTE ADULT - ASSESSMENT
IMPRESSION  49 yo M with HIV on ART, DM, DLD, recent cavity filling 8/26, presents to the ED for worsened left lower molar pain for 1 week, found to have ST elevations V2-3 and elevated CE, fever    ASSESSMENT/RECOMMENDATION  #NSTEMI. ST elevations V1-3 (seen on previous EKG at outpatient Cards) and elevated CE, ischemic vs focal myocarditis. CE now downtrending. Pt denies any chest pain. Unclear if tooth pain is a manifestation of cardiac issues as occurred one week prior and pt was diagnosed with a true tooth pathology s/p cavity filling 8/26. Recent EKG/Stress test wnl.   - Cardiology following Dr. Griffin, pending Cath tomorrow  - Trend EKG and Shaneka  - clopidogrel Tablet 75 milliGRAM(s) Oral daily  - heparin  Infusion 1350 Unit(s)/Hr (13.5 mL/Hr) IV Continuous <Continuous>  - aspirin enteric coated 81 milliGRAM(s) Oral daily  - TTE with decreased LVEF compared to previous, ischemic CM    #Tooth ache with L-neck swelling. Fevers. CT neck wnl, focal air lower molar likely 2/2 procedure. Doubt Lemierre's/Lugwigs, but on differential.   - All BCX NGTD  - Continue ampicillin/sulbactam  IVPB 3 Gram(s) IV Intermittent every 6 hours  - ENT consult appreciated, pending repeat CT neck w/ IV  - IVF as getting IV contrast with CT and then Cath tomorrow  - Appreciate OMFS consult    #HLD  - Discuss need for high dose statin with Cards, on atorvastatin 40 milliGRAM(s) Oral at bedtime    #Asymptomatic HIV 5/2019 CD4 670, VL UD. Well-controlled  - abacavir 600 mG/dolutegravir 50 mG/lamiVUDine 300 mG 1 Tablet(s) Oral daily  - Follows with [facility]    #HTN  - metoprolol tartrate 25 milliGRAM(s) Oral two times a day  - no ace/arb per Cards    #GERD  - pantoprazole    Tablet 40 milliGRAM(s) Oral before breakfast    Spectra [id number]

## 2019-09-04 NOTE — PROGRESS NOTE ADULT - SUBJECTIVE AND OBJECTIVE BOX
HPI  Patient is a 48y old Male who presents with a chief complaint of Fevers and left-sided tooth/facial pain (03 Sep 2019 13:47)    Currently admitted to medicine with the primary diagnosis of Fever     Today is hospital day 3d.     INTERVAL HPI / OVERNIGHT EVENTS:  Patient was examined and seen at bedside. This morning he is resting comfortably in bed and reports no new issues or overnight events.     ROS: Otherwise unremarkable     PAST MEDICAL & SURGICAL HISTORY  Diabetes  Hypercholesteremia  HIV (human immunodeficiency virus infection)  History of appendectomy    ALLERGIES  No Known Allergies    MEDICATIONS  STANDING MEDICATIONS  abacavir 600 mG/dolutegravir 50 mG/lamiVUDine 300 mG 1 Tablet(s) Oral daily  ampicillin/sulbactam  IVPB 3 Gram(s) IV Intermittent every 6 hours  ampicillin/sulbactam  IVPB      aspirin enteric coated 81 milliGRAM(s) Oral daily  atorvastatin 40 milliGRAM(s) Oral at bedtime  chlorhexidine 4% Liquid 1 Application(s) Topical <User Schedule>  clopidogrel Tablet 75 milliGRAM(s) Oral daily  heparin  Infusion 1450 Unit(s)/Hr IV Continuous <Continuous>  metoprolol tartrate 25 milliGRAM(s) Oral two times a day  pantoprazole    Tablet 40 milliGRAM(s) Oral before breakfast    PRN MEDICATIONS  acetaminophen   Tablet .. 650 milliGRAM(s) Oral every 6 hours PRN  acetaminophen   Tablet .. 650 milliGRAM(s) Oral every 6 hours PRN  traMADol 50 milliGRAM(s) Oral three times a day PRN    VITALS:  T(F): 97  HR: 73  BP: 125/61  RR: 18  SpO2: 98%    PHYSICAL EXAM  GEN: NAD, Resting comfortably in bed  PULM: Clear to auscultation bilaterally, No wheezes  CVS: Regular rate and rhythm, S1-S2, no murmurs  ABD: Soft, non-tender, non-distended, no guarding  EXT: No edema  NEURO: AAOx3, no focal deficits    LABS                        12.1   4.32  )-----------( 100      ( 04 Sep 2019 06:00 )             35.5     09-03    134<L>  |  99  |  8<L>  ----------------------------<  151<H>  3.4<L>   |  22  |  0.7    Ca    8.3<L>      03 Sep 2019 04:33  Mg     1.9           PT/INR - ( 04 Sep 2019 06:00 )   PT: 13.90 sec;   INR: 1.21 ratio         PTT - ( 04 Sep 2019 06:00 )  PTT:64.7 sec  Urinalysis Basic - ( 03 Sep 2019 00:00 )    Color: Yellow / Appearance: Clear / S.016 / pH: x  Gluc: x / Ketone: Negative  / Bili: Negative / Urobili: 3 mg/dL   Blood: x / Protein: 30 mg/dL / Nitrite: Negative   Leuk Esterase: Negative / RBC: 6 /HPF / WBC 1 /HPF   Sq Epi: x / Non Sq Epi: 0 /HPF / Bacteria: Negative        Troponin T, Serum: 0.49 ng/mL <HH> (19 @ 06:00)  Troponin T, Serum: 0.33 ng/mL <HH> (19 @ 11:01)  Creatine Kinase, Serum: 185 U/L (19 @ 11:01)    CKMB Units (19 @ 06:00)    CKMB Units: 4.2 ng/mL    CKMB Units (19 @ 11:01)    CKMB Units: 5.9 ng/mL    CKMB Units (19 @ 04:33)    CKMB Units: 6.5 ng/mL    CKMB Units (19 @ 23:49)    CKMB Units: 7.5 ng/mL    Culture - Blood (collected 02 Sep 2019 05:44)  Source: .Blood None  Preliminary Report (03 Sep 2019 11:13):    No growth to date.    Culture - Blood (collected 01 Sep 2019 18:50)  Source: .Blood Blood  Preliminary Report (02 Sep 2019 23:01):    No growth to date.    Culture - Blood (collected 01 Sep 2019 18:50)  Source: .Blood Blood  Preliminary Report (02 Sep 2019 23:01):    No growth to date.      CARDIAC MARKERS ( 04 Sep 2019 06:00 )  x     / 0.49 ng/mL / x     / x     / 4.2 ng/mL  CARDIAC MARKERS ( 03 Sep 2019 11:01 )  x     / 0.33 ng/mL / 185 U/L / x     / 5.9 ng/mL  CARDIAC MARKERS ( 03 Sep 2019 04:33 )  x     / 0.34 ng/mL / 228 U/L / x     / 6.5 ng/mL  CARDIAC MARKERS ( 02 Sep 2019 23:49 )  x     / 0.36 ng/mL / 278 U/L / x     / 7.5 ng/mL      RADIOLOGY  < from: Xray Chest 2 Views PA/Lat (19 @ 19:12) >  Impression:      No radiographic evidence of acute cardiopulmonary disease.    < end of copied text >  < from: CT Neck Soft Tissue w/ IV Cont (19 @ 20:28) >  Impression:     Normal CT scan of the soft tissues of the neck with contrast.    < end of copied text >    < from: Transthoracic Echocardiogram (19 @ 13:49) >  Summary:   1. Left ventricular ejection fraction, by visual estimation, is 40 to   45%.   2. Mildly to moderately decreased global left ventricular systolic   function.   3. Multiple left ventricular regional wall motion abnormalities exist.   See wall motion findings.   4. Ischemic cardiomyopathy.   5. LA volume Index is 16.0 ml/m² ml/m2.    < end of copied text > HPI  Patient is a 48y old Male who presents with a chief complaint of Fevers and left-sided tooth/facial pain (03 Sep 2019 13:47)    Currently admitted to medicine with the primary diagnosis of Fever     Today is hospital day 3d.     INTERVAL HPI / OVERNIGHT EVENTS:  Patient was examined and seen at bedside. This morning he is resting comfortably in bed and reports no new issues or overnight events. The patient was still complaining of swelling in the left cheek, but denied chest pain, sob, abdominal pain, or changes in BM or urinary habits.     ROS: Otherwise unremarkable     PAST MEDICAL & SURGICAL HISTORY  Diabetes  Hypercholesteremia  HIV (human immunodeficiency virus infection)  History of appendectomy    ALLERGIES  No Known Allergies    MEDICATIONS  STANDING MEDICATIONS  abacavir 600 mG/dolutegravir 50 mG/lamiVUDine 300 mG 1 Tablet(s) Oral daily  ampicillin/sulbactam  IVPB 3 Gram(s) IV Intermittent every 6 hours  ampicillin/sulbactam  IVPB      aspirin enteric coated 81 milliGRAM(s) Oral daily  atorvastatin 40 milliGRAM(s) Oral at bedtime  chlorhexidine 4% Liquid 1 Application(s) Topical <User Schedule>  clopidogrel Tablet 75 milliGRAM(s) Oral daily  heparin  Infusion 1450 Unit(s)/Hr IV Continuous <Continuous>  metoprolol tartrate 25 milliGRAM(s) Oral two times a day  pantoprazole    Tablet 40 milliGRAM(s) Oral before breakfast    PRN MEDICATIONS  acetaminophen   Tablet .. 650 milliGRAM(s) Oral every 6 hours PRN  acetaminophen   Tablet .. 650 milliGRAM(s) Oral every 6 hours PRN  traMADol 50 milliGRAM(s) Oral three times a day PRN    VITALS:  T(F): 97  HR: 73  BP: 125/61  RR: 18  SpO2: 98%    PHYSICAL EXAM  GEN: NAD, Resting comfortably in bed eating breakfast  PULM: Clear to auscultation bilaterally, No wheezes  CVS: Regular rate and rhythm, S1-S2, no murmurs  ABD: Soft, non-tender, non-distended, no guarding  EXT: No edema  HEENT: Swelling in the left cheek at the level of the mandible angle. Tenderness and firmness to palpation of left SM muscle and angle of mandible. Restriction in jaw deviation to the left and jaw opening. No palapble LA, but very firm to palpation along the mastoid attachment of the SCM. No mastoid bone tenderness.  NEURO: AAOx3, no focal deficits    LABS                        12.1   4.32  )-----------( 100      ( 04 Sep 2019 06:00 )             35.5         134<L>  |  99  |  8<L>  ----------------------------<  151<H>  3.4<L>   |  22  |  0.7    Ca    8.3<L>      03 Sep 2019 04:33  Mg     1.9           PT/INR - ( 04 Sep 2019 06:00 )   PT: 13.90 sec;   INR: 1.21 ratio         PTT - ( 04 Sep 2019 06:00 )  PTT:64.7 sec  Urinalysis Basic - ( 03 Sep 2019 00:00 )    Color: Yellow / Appearance: Clear / S.016 / pH: x  Gluc: x / Ketone: Negative  / Bili: Negative / Urobili: 3 mg/dL   Blood: x / Protein: 30 mg/dL / Nitrite: Negative   Leuk Esterase: Negative / RBC: 6 /HPF / WBC 1 /HPF   Sq Epi: x / Non Sq Epi: 0 /HPF / Bacteria: Negative        Troponin T, Serum: 0.49 ng/mL <HH> (19 @ 06:00)  Troponin T, Serum: 0.33 ng/mL <HH> (19 @ 11:01)  Creatine Kinase, Serum: 185 U/L (19 @ 11:01)    CKMB Units (19 @ 06:00)    CKMB Units: 4.2 ng/mL    CKMB Units (19 @ 11:01)    CKMB Units: 5.9 ng/mL    CKMB Units (19 @ 04:33)    CKMB Units: 6.5 ng/mL    CKMB Units (19 @ 23:49)    CKMB Units: 7.5 ng/mL    Culture - Blood (collected 02 Sep 2019 05:44)  Source: .Blood None  Preliminary Report (03 Sep 2019 11:13):    No growth to date.    Culture - Blood (collected 01 Sep 2019 18:50)  Source: .Blood Blood  Preliminary Report (02 Sep 2019 23:01):    No growth to date.    Culture - Blood (collected 01 Sep 2019 18:50)  Source: .Blood Blood  Preliminary Report (02 Sep 2019 23:01):    No growth to date.      CARDIAC MARKERS ( 04 Sep 2019 06:00 )  x     / 0.49 ng/mL / x     / x     / 4.2 ng/mL  CARDIAC MARKERS ( 03 Sep 2019 11:01 )  x     / 0.33 ng/mL / 185 U/L / x     / 5.9 ng/mL  CARDIAC MARKERS ( 03 Sep 2019 04:33 )  x     / 0.34 ng/mL / 228 U/L / x     / 6.5 ng/mL  CARDIAC MARKERS ( 02 Sep 2019 23:49 )  x     / 0.36 ng/mL / 278 U/L / x     / 7.5 ng/mL      RADIOLOGY  < from: Xray Chest 2 Views PA/Lat (19 @ 19:12) >  Impression:      No radiographic evidence of acute cardiopulmonary disease.    < end of copied text >  < from: CT Neck Soft Tissue w/ IV Cont (19 @ 20:28) >  Impression:     Normal CT scan of the soft tissues of the neck with contrast.    < end of copied text >    < from: Transthoracic Echocardiogram (19 @ 13:49) >  Summary:   1. Left ventricular ejection fraction, by visual estimation, is 40 to   45%.   2. Mildly to moderately decreased global left ventricular systolic   function.   3. Multiple left ventricular regional wall motion abnormalities exist.   See wall motion findings.   4. Ischemic cardiomyopathy.   5. LA volume Index is 16.0 ml/m² ml/m2.    < end of copied text >

## 2019-09-05 LAB
BLD GP AB SCN SERPL QL: SIGNIFICANT CHANGE UP
GLUCOSE BLDC GLUCOMTR-MCNC: 133 MG/DL — HIGH (ref 70–99)
GLUCOSE BLDC GLUCOMTR-MCNC: 171 MG/DL — HIGH (ref 70–99)
INR BLD: 1.13 RATIO — SIGNIFICANT CHANGE UP (ref 0.65–1.3)
PROTHROM AB SERPL-ACNC: 13 SEC — HIGH (ref 9.95–12.87)

## 2019-09-05 PROCEDURE — ZZZZZ: CPT

## 2019-09-05 PROCEDURE — 93880 EXTRACRANIAL BILAT STUDY: CPT | Mod: 26

## 2019-09-05 PROCEDURE — 71250 CT THORAX DX C-: CPT | Mod: 26

## 2019-09-05 PROCEDURE — 99222 1ST HOSP IP/OBS MODERATE 55: CPT

## 2019-09-05 RX ORDER — ENOXAPARIN SODIUM 100 MG/ML
40 INJECTION SUBCUTANEOUS DAILY
Refills: 0 | Status: DISCONTINUED | OUTPATIENT
Start: 2019-09-05 | End: 2019-09-06

## 2019-09-05 RX ADMIN — ATORVASTATIN CALCIUM 40 MILLIGRAM(S): 80 TABLET, FILM COATED ORAL at 22:23

## 2019-09-05 RX ADMIN — CHLORHEXIDINE GLUCONATE 1 APPLICATION(S): 213 SOLUTION TOPICAL at 06:46

## 2019-09-05 RX ADMIN — Medication 25 MILLIGRAM(S): at 05:09

## 2019-09-05 RX ADMIN — ENOXAPARIN SODIUM 40 MILLIGRAM(S): 100 INJECTION SUBCUTANEOUS at 12:14

## 2019-09-05 RX ADMIN — AMPICILLIN SODIUM AND SULBACTAM SODIUM 200 GRAM(S): 250; 125 INJECTION, POWDER, FOR SUSPENSION INTRAMUSCULAR; INTRAVENOUS at 18:09

## 2019-09-05 RX ADMIN — Medication 81 MILLIGRAM(S): at 07:40

## 2019-09-05 RX ADMIN — Medication 25 MILLIGRAM(S): at 18:09

## 2019-09-05 RX ADMIN — AMPICILLIN SODIUM AND SULBACTAM SODIUM 200 GRAM(S): 250; 125 INJECTION, POWDER, FOR SUSPENSION INTRAMUSCULAR; INTRAVENOUS at 05:08

## 2019-09-05 RX ADMIN — AMPICILLIN SODIUM AND SULBACTAM SODIUM 200 GRAM(S): 250; 125 INJECTION, POWDER, FOR SUSPENSION INTRAMUSCULAR; INTRAVENOUS at 14:16

## 2019-09-05 NOTE — CONSULT NOTE ADULT - SUBJECTIVE AND OBJECTIVE BOX
Surgeon: /Patrizia/ Lata    Consult requesting by: Elias    [facility]    HISTORY OF PRESENT ILLNESS:  48y Male presented to ED with 1 week h/o left Jaw pain. found to have elevated troponins with ekg changes, STEMI code called and cancelled. PMH consitent for DM, HIV, AP DLD. Seen be OMF who felt pain in jaw related to dental caries. Patient sent for echo which revealed ischemic cardiomyopathy and subsequent cardiac catheterization demonstrated multi-vessel CAD. No chest pain at current time. CTS called for CABG evaluation    NYHA functional class    [X ] Class I (no limitation) [ ] Class II (slight limitation) [ ] Class III (marked limitation) [ ] Class IV (symptoms at rest)    PAST MEDICAL & SURGICAL HISTORY:  Diabetes  Hypercholesteremia  HIV (human immunodeficiency virus infection)  History of appendectomy      MEDICATIONS  (STANDING):  abacavir 600 mG/dolutegravir 50 mG/lamiVUDine 300 mG 1 Tablet(s) Oral daily  ampicillin/sulbactam  IVPB 3 Gram(s) IV Intermittent every 6 hours  ampicillin/sulbactam  IVPB      aspirin enteric coated 81 milliGRAM(s) Oral daily  atorvastatin 40 milliGRAM(s) Oral at bedtime  chlorhexidine 4% Liquid 1 Application(s) Topical <User Schedule>  enoxaparin Injectable 40 milliGRAM(s) SubCutaneous daily  metoprolol tartrate 25 milliGRAM(s) Oral two times a day  pantoprazole    Tablet 40 milliGRAM(s) Oral before breakfast  sodium chloride 0.9%. 1000 milliLiter(s) (75 mL/Hr) IV Continuous <Continuous>    MEDICATIONS  (PRN):  acetaminophen   Tablet .. 650 milliGRAM(s) Oral every 6 hours PRN Temp greater or equal to 38C (100.4F)  acetaminophen   Tablet .. 650 milliGRAM(s) Oral every 6 hours PRN Mild Pain (1 - 3)  traMADol 50 milliGRAM(s) Oral three times a day PRN Moderate Pain (4 - 6)    Antiplatelet therapy:     Plavix today                      Last dose/amt:    Allergies    No Known Allergies    Intolerances        SOCIAL HISTORY:  Smoker: [ ] Yes  [ ] No        PACK YEARS:                         WHEN QUIT?  ETOH use:   [ ] No               Ilicit Drug use:   [ ] No  Occupation:  Lives with: family  Assisted device use: none  5 meter walk test: 1____sec, 2____sec, 3___sec just cathed  FAMILY HISTORY:  FH: diabetes mellitus: mother and father  FH: heart failure: mother      Review of Systems; at current time  CONSTITUTIONAL: no fever, chills or night sweats]   NEURO:  denies seizures, paralysis or paresthesias                                                                                EYES: wears glasses, no double vision, no blurry vision                                                                          ENMT: no difficulty hearing, vertigo, dysphagia, epistaxis recent dental work [ ]                                      CV:  denies chest pain, JIMÉNEZ or palpitations' at current time                                                                                            RESPIRATORY:  no cough or hemoptysis                                                                 GI:  no nausea, vomiting, constipation or diarrhea   : denies dysuria, hematuria, incontinence or retention                                                                                           MUSKULOSKELETAL:  denies joint swelling or muscle weakness  PSYCH:  denies dementia, depresion, anxiety   ENDOCRINE:  cold intolerance[ ] heat intolerance[ ] polydipsia[ ]                                                                                                                                                                                                PHYSICAL EXAM  Vital Signs Last 24 Hrs  T(C): 35.9 (05 Sep 2019 11:00), Max: 36.4 (05 Sep 2019 05:38)  T(F): 96.6 (05 Sep 2019 11:00), Max: 97.6 (05 Sep 2019 05:38)  HR: 69 (05 Sep 2019 11:00) (61 - 84)  BP: 111/63 (05 Sep 2019 11:00) (109/61 - 133/80)  BP(mean): 89 (04 Sep 2019 18:25) (89 - 89)  RR: 18 (05 Sep 2019 11:00) (16 - 18)  SpO2: --  Right arm bp: Left arm bp;    CONSTITUTIONAL:    well nourished, well developed, overweight in NAD                                                                       Neuro: oriented to person/place & time with no focal motor or sensory  deficits     Eyes: PERRLA, EOMI, no nystagmus, sclera anicteric  ENT:  nasal/oral mucosa with absence of cyanosis, fair dentition  Neck: no jugular vein distention, trachea midline, no goiter   Chest: bilatertal breath sounds with good air movement absence of wheezes, rales, or rhonchi                                                                           CV:  RSR, S1S2, no significant murmur appreciated  Carotids: No Bruits  GI:  soft, non-tender non-distended, + bowel sounds                                                                                                          Extremities:  no evidence of cyanosis or deformity, no pedal edema Edema  Extremity Pulses: right / left:  femorals 2+/ 2+; DP's 2+/2+; radials 2+/2+ negative Allens  SKIN : no rashes                                                          LABS:                        12.1   4.32  )-----------( 100      ( 04 Sep 2019 06:00 )             35.5     09-04    138  |  101  |  9<L>  ----------------------------<  141<H>  3.7   |  21  |  0.7    Ca    8.5      04 Sep 2019 06:00  Phos  3.7     09-04  Mg     2.0     09-04    TPro  6.3  /  Alb  3.7  /  TBili  0.7  /  DBili  x   /  AST  28  /  ALT  37  /  AlkPhos  42  09-04    PT/INR - ( 04 Sep 2019 20:00 )   PT: 13.00 sec;   INR: 1.13 ratio         PTT - ( 04 Sep 2019 06:00 )  PTT:64.7 sec    CARDIAC MARKERS ( 04 Sep 2019 06:00 )  x     / 0.49 ng/mL / 120 U/L / x     / 4.2 ng/mL    Cardiac Cath:   Coronary circulation: There was 3-vessel coronary artery disease (LAD, RCA, and circumflex). Left main: Angiography showed moderate atherosclerosis predominantly in distal half of the vessel, no clinical lesions appreciated. LAD: Angiography showed multiple discrete lesions. Ostial LAD: There was a tubular 50 % stenosis. Proximal LAD: There was a tubular 99 % stenosis. There was KEN grade 2 flow through the vessel (partial perfusion). This lesion is a chronic total occlusion. Mid LAD: There was a 100 % stenosis. There was KEN grade 0 flow through the vessel (no flow). 1st diagonal: The vessel was small sized. Angiography showed moderate atherosclerosis with no clinical lesions appreciated. 2nd diagonal: The vessel was small to medium sized. Angiography showed multiple discrete lesions. There was a tubular 60 % stenosis in the proximal third of the vessel segment. In a second lesion, there was a tubular 50 % stenosis. Distal vessel angiography showed severe diffuse disease. Proximal circumflex: Normal. Mid circumflex: Angiography showed mild atherosclerosis with no flow limiting lesions. Distal circumflex: There was a tubular 60 % stenosis at the origin of OM2. Distal vessel angiography showed severe diffuse disease. 1st obtuse marginal: The vessel was small sized. Angiography showed severe atherosclerosis. 2nd obtuse marginal: The vessel was medium sized. There was a discrete 90 % stenosis in the proximal third of the vessel segment. 3rd obtuse marginal: Normal. RCA: The vessel was large sized. There was a 100 % stenosis in the proximal third of the vessel segment. There was KEN grade 1 flow through the vessel (slow flow without perfusion). This lesion is a chronic total occlusion. Right PDA: The distal vessel was supplied by collaterals from septal branches of LAD.     TTE / GWEN:  < from: Transthoracic Echocardiogram (09.02.19 @ 13:49) >  Summary:   1. Left ventricular ejection fraction, by visual estimation, is 40 to   45%.   2. Mildly to moderately decreased global left ventricular systolic   function.   3. Multiple left ventricular regional wall motion abnormalities exist.   See wall motion findings.   4. Ischemic cardiomyopathy.   5. LA volume Index is 16.0 ml/m² ml/m2.    < end of copied text >  < from: Xray Chest 2 Views PA/Lat (09.01.19 @ 19:12) >  Impression:      No radiographic evidence of acute cardiopulmonary disease.    < end of copied text >    Recommendation: (Procedures/Evaluations)  CT Chest without contrast  Carotid Duplex   PFT : Simple PFT   Dental Consult [ ]   possible Hem-Onc Consult for thrombocytopeniz    STS Score:   Isolated CAB CALCULATE   Risk of Mortality: 	0.777% 	  Renal Failure: 	1.178% 	  Permanent Stroke: 	0.843% 	  Prolonged Ventilation: 	6.585% 	  DSW Infection: 	0.431% 	  Reoperation: 	2.707% 	  Morbidity or Mortality: 	9.409% 	  Short Length of Stay: 	57.049% 	  Long Length of Stay: 	2.803%	    Impression:     CAD [ ]  Anemia: Yes [ ],   Diabetes :Yes [ ]  Acute MI: Yes [ ],  Heart Failure: Yes , HFrEF [ ]    Dental Caries      Assessment/ Plan:    Patient is a candidate for CABG, will start pre-op work up.  To see dental for caries  Anticipate CABG next week due to current Plavix usage,   Will discuss with attending

## 2019-09-05 NOTE — PROGRESS NOTE ADULT - ASSESSMENT
PT is a 48 y.o male with odontogenic pain, infected L lower wisdom tooth - CT shows infiltrative changes to the submandibular gland, no abscess.    ·	cont IV abx for now  ·	recalled OMFS - s/w resident to re-eval patient given he is being pre op'd for CABG next week, possible tooth extraction prior?  ·	no acute ENT intervention  ·	w/d with attng, discussed with medical resident.

## 2019-09-05 NOTE — PROGRESS NOTE ADULT - SUBJECTIVE AND OBJECTIVE BOX
BRISEIDA SIMON  48y, Male  Allergy: No Known Allergies      CHIEF COMPLAINT: Fevers and left-sided tooth/facial pain (05 Sep 2019 08:58)      INTERVAL EVENTS/HPI  - No acute events overnight  - s/p repeat CT neck Mild submandibular infiltrative changes greater on the left which can be seen in the setting of cellulitis. No evidence of abscess.  - pending cath   - T(F): , Max: 97.6 (09-05-19 @ 05:38)  - Denies any worsening symptoms  - Tolerating medication  -     ROS  General: Denies rigors, nightsweats  HEENT: Denies headache, rhinorrhea, sore throat, eye pain  CV: Denies CP, palpitations  PULM: Denies SOB, wheezing  GI: Denies hematemesis, hematochezia, melena  : Denies discharge, hematuria  MSK: Denies arthralgias, myalgias  SKIN: Denies rash, lesions  NEURO: Denies paresthesias, weakness  PSYCH: Denies depression, anxiety    VITALS:  T(F): 97.6, Max: 97.6 (09-05-19 @ 05:38)  HR: 61  BP: 133/80  RR: 18Vital Signs Last 24 Hrs  T(C): 36.4 (05 Sep 2019 05:38), Max: 36.4 (05 Sep 2019 05:38)  T(F): 97.6 (05 Sep 2019 05:38), Max: 97.6 (05 Sep 2019 05:38)  HR: 61 (05 Sep 2019 05:38) (61 - 84)  BP: 133/80 (05 Sep 2019 05:38) (109/61 - 133/80)  BP(mean): 89 (04 Sep 2019 18:25) (89 - 89)  RR: 18 (05 Sep 2019 05:38) (16 - 18)  SpO2: --    PHYSICAL EXAM:  Gen: NAD, resting in bed  HEENT: Normocephalic, atraumatic, L lower molar erythema  Neck: supple, no lymphadenopathy,  decreased L neck induration in submandibular area, TTP, trismus  CV: Regular rate & regular rhythm  Lungs: CTAB  Abdomen: Soft, BS present  Ext: Warm, well perfused  Neuro: non focal, awake  Skin: no rash, no erythema  Lines: no phlebitis      FH: Non-contributory  Social Hx: Non-contributory    TESTS & MEASUREMENTS:                        12.1   4.32  )-----------( 100      ( 04 Sep 2019 06:00 )             35.5     09-04    138  |  101  |  9<L>  ----------------------------<  141<H>  3.7   |  21  |  0.7    Ca    8.5      04 Sep 2019 06:00  Phos  3.7     09-04  Mg     2.0     09-04    TPro  6.3  /  Alb  3.7  /  TBili  0.7  /  DBili  x   /  AST  28  /  ALT  37  /  AlkPhos  42  09-04      LIVER FUNCTIONS - ( 04 Sep 2019 06:00 )  Alb: 3.7 g/dL / Pro: 6.3 g/dL / ALK PHOS: 42 U/L / ALT: 37 U/L / AST: 28 U/L / GGT: x               Culture - Urine (collected 09-03-19 @ 00:00)  Source: .Urine Clean Catch (Midstream)  Final Report (09-04-19 @ 10:59):    No growth    Culture - Blood (collected 09-02-19 @ 05:44)  Source: .Blood None  Preliminary Report (09-03-19 @ 11:13):    No growth to date.    Culture - Blood (collected 09-01-19 @ 18:50)  Source: .Blood Blood  Preliminary Report (09-02-19 @ 23:01):    No growth to date.    Culture - Blood (collected 09-01-19 @ 18:50)  Source: .Blood Blood  Preliminary Report (09-02-19 @ 23:01):    No growth to date.        Lactate, Blood: 1.7 mmol/L (09-01-19 @ 18:50)      INFECTIOUS DISEASES TESTING  HIV-1/2 Combo Result: Reactive (09-02-19 @ 05:44)      RADIOLOGY & ADDITIONAL TESTS:  I have personally reviewed the last Chest xray  CXR      CT      CARDIOLOGY TESTING  12 Lead ECG:   Ventricular Rate 69 BPM    Atrial Rate 69 BPM    P-R Interval 180 ms    QRS Duration 108 ms    Q-T Interval 408 ms    QTC Calculation(Bezet) 437 ms    P Axis 32 degrees    R Axis 66 degrees    T Axis 21 degrees    Diagnosis Line Sinus rhythm with occasional Premature ventricular complexes  Anteroseptal infarct , age undetermined  Abnormal ECG    Confirmed by Chin Liao ([id number]) on 9/4/2019 8:36:13 AM (09-04-19 @ 07:40)  12 Lead ECG:   Ventricular Rate 83 BPM    Atrial Rate 83 BPM    P-R Interval 164 ms    QRS Duration 112 ms    Q-T Interval 366 ms    QTC Calculation(Bezet) 430 ms    P Axis 34 degrees    R Axis 96 degrees    T Axis -11 degrees    Diagnosis Line Normal sinus rhythm  Possible Left atrial enlargement  Rightward axis  Anteroseptal infarct , age undetermined  Abnormal ECG    Confirmed by MATT WAYNE MD [id number]) on 9/3/2019 8:04:02 AM (09-03-19 @ 07:33)      MEDICATIONS  abacavir 600 mG/dolutegravir 50 mG/lamiVUDine 300 mG 1  ampicillin/sulbactam  IVPB 3  ampicillin/sulbactam  IVPB   aspirin enteric coated 81  atorvastatin 40  chlorhexidine 4% Liquid 1  enoxaparin Injectable 40  metoprolol tartrate 25  pantoprazole    Tablet 40  sodium chloride 0.9%. 1000      ANTIBIOTICS:  abacavir 600 mG/dolutegravir 50 mG/lamiVUDine 300 mG 1 Tablet(s) Oral daily  ampicillin/sulbactam  IVPB 3 Gram(s) IV Intermittent every 6 hours  ampicillin/sulbactam  IVPB          All available historical records have been reviewed BRISEIDA SIMON  48y, Male  Allergy: No Known Allergies      CHIEF COMPLAINT: Fevers and left-sided tooth/facial pain (05 Sep 2019 08:58)      INTERVAL EVENTS/HPI  - Cath with multivessel disease  - s/p repeat CT neck Mild submandibular infiltrative changes greater on the left which can be seen in the setting of cellulitis. No evidence of abscess.  - T(F): , Max: 97.6 (09-05-19 @ 05:38)  - Denies any worsening symptoms  - Tolerating medication  -     ROS  General: Denies rigors, nightsweats  HEENT: Denies headache, rhinorrhea, sore throat, eye pain  CV: Denies CP, palpitations  PULM: Denies SOB, wheezing  GI: Denies hematemesis, hematochezia, melena  : Denies discharge, hematuria  MSK: Denies arthralgias, myalgias  SKIN: Denies rash, lesions  NEURO: Denies paresthesias, weakness  PSYCH: Denies depression, anxiety    VITALS:  T(F): 97.6, Max: 97.6 (09-05-19 @ 05:38)  HR: 61  BP: 133/80  RR: 18Vital Signs Last 24 Hrs  T(C): 36.4 (05 Sep 2019 05:38), Max: 36.4 (05 Sep 2019 05:38)  T(F): 97.6 (05 Sep 2019 05:38), Max: 97.6 (05 Sep 2019 05:38)  HR: 61 (05 Sep 2019 05:38) (61 - 84)  BP: 133/80 (05 Sep 2019 05:38) (109/61 - 133/80)  BP(mean): 89 (04 Sep 2019 18:25) (89 - 89)  RR: 18 (05 Sep 2019 05:38) (16 - 18)  SpO2: --    PHYSICAL EXAM:  Gen: NAD, resting in bed  HEENT: Normocephalic, atraumatic, L lower molar erythema  Neck: supple, no lymphadenopathy,  decreased L neck induration in submandibular area, TTP, trismus  CV: Regular rate & regular rhythm  Lungs: CTAB  Abdomen: Soft, BS present  Ext: Warm, well perfused  Neuro: non focal, awake  Skin: no rash, no erythema  Lines: no phlebitis      FH: Non-contributory  Social Hx: Non-contributory    TESTS & MEASUREMENTS:                        12.1   4.32  )-----------( 100      ( 04 Sep 2019 06:00 )             35.5     09-04    138  |  101  |  9<L>  ----------------------------<  141<H>  3.7   |  21  |  0.7    Ca    8.5      04 Sep 2019 06:00  Phos  3.7     09-04  Mg     2.0     09-04    TPro  6.3  /  Alb  3.7  /  TBili  0.7  /  DBili  x   /  AST  28  /  ALT  37  /  AlkPhos  42  09-04      LIVER FUNCTIONS - ( 04 Sep 2019 06:00 )  Alb: 3.7 g/dL / Pro: 6.3 g/dL / ALK PHOS: 42 U/L / ALT: 37 U/L / AST: 28 U/L / GGT: x               Culture - Urine (collected 09-03-19 @ 00:00)  Source: .Urine Clean Catch (Midstream)  Final Report (09-04-19 @ 10:59):    No growth    Culture - Blood (collected 09-02-19 @ 05:44)  Source: .Blood None  Preliminary Report (09-03-19 @ 11:13):    No growth to date.    Culture - Blood (collected 09-01-19 @ 18:50)  Source: .Blood Blood  Preliminary Report (09-02-19 @ 23:01):    No growth to date.    Culture - Blood (collected 09-01-19 @ 18:50)  Source: .Blood Blood  Preliminary Report (09-02-19 @ 23:01):    No growth to date.        Lactate, Blood: 1.7 mmol/L (09-01-19 @ 18:50)      INFECTIOUS DISEASES TESTING  HIV-1/2 Combo Result: Reactive (09-02-19 @ 05:44)      RADIOLOGY & ADDITIONAL TESTS:  I have personally reviewed the last Chest xray  CXR      CT      CARDIOLOGY TESTING  12 Lead ECG:   Ventricular Rate 69 BPM    Atrial Rate 69 BPM    P-R Interval 180 ms    QRS Duration 108 ms    Q-T Interval 408 ms    QTC Calculation(Bezet) 437 ms    P Axis 32 degrees    R Axis 66 degrees    T Axis 21 degrees    Diagnosis Line Sinus rhythm with occasional Premature ventricular complexes  Anteroseptal infarct , age undetermined  Abnormal ECG    Confirmed by Chin Liao ([id number]) on 9/4/2019 8:36:13 AM (09-04-19 @ 07:40)  12 Lead ECG:   Ventricular Rate 83 BPM    Atrial Rate 83 BPM    P-R Interval 164 ms    QRS Duration 112 ms    Q-T Interval 366 ms    QTC Calculation(Bezet) 430 ms    P Axis 34 degrees    R Axis 96 degrees    T Axis -11 degrees    Diagnosis Line Normal sinus rhythm  Possible Left atrial enlargement  Rightward axis  Anteroseptal infarct , age undetermined  Abnormal ECG    Confirmed by MATT WAYNE MD [id number]) on 9/3/2019 8:04:02 AM (09-03-19 @ 07:33)      MEDICATIONS  abacavir 600 mG/dolutegravir 50 mG/lamiVUDine 300 mG 1  ampicillin/sulbactam  IVPB 3  ampicillin/sulbactam  IVPB   aspirin enteric coated 81  atorvastatin 40  chlorhexidine 4% Liquid 1  enoxaparin Injectable 40  metoprolol tartrate 25  pantoprazole    Tablet 40  sodium chloride 0.9%. 1000      ANTIBIOTICS:  abacavir 600 mG/dolutegravir 50 mG/lamiVUDine 300 mG 1 Tablet(s) Oral daily  ampicillin/sulbactam  IVPB 3 Gram(s) IV Intermittent every 6 hours  ampicillin/sulbactam  IVPB          All available historical records have been reviewed

## 2019-09-05 NOTE — CONSULT NOTE ADULT - ATTENDING COMMENTS
Reviewed angios. Agree with CABG. explained risks of surgery to patient and family including death, stroke, bleeding, infection etc. Will treat dental infection with antibiotics, check echo, PFTs. Also long time smoker so will need pulmonary optimization with bronchodilators and mucolytics.

## 2019-09-05 NOTE — CHART NOTE - NSCHARTNOTEFT_GEN_A_CORE
Preliminary Cardiac Catheterization Post-Procedure Report:09-05-19 @ 09:26    Procedure Performed:  [x] Left Heart Catheterization  [ ] Right Heart Catheterization  [ ] Percutaneous Coronary Intervention    Primary Physician: Dr. Elias MD  Assistant(s): Dr. Andria MD and  Dr. Bayron MD    Preliminary Procedure Summary (Official full report to follow)    Pre-procedure diagnosis:   Post Procedure Diagnosis/Impression:    Left Heart Catheterization:  approximate EF%:  15%  [ ] Normal Coronary Arteries  [ ] Luminal Irregularities  [ ] non-obstructive CAD  [x] 3 vessel coronary artery disease       Anesthesia Type  [x] conscious sedation  [x] local/regional anesthesia  [  ] general anesthesia    Estimated Blood Loss  [x ] less than 30 ml    Amount of Contrast used: 75 ml    Access  [ ] Rt. Femoral A  [ ] Rt. Femoral V  [x] Rt. Radial A  [ ] Rt. Brachial V    Condition of patient after procedure  [x] stable  [  ] guarded  [  ] satisfactory     CATH SUMMARY/FINDINGS:    Dominance:   [x] Right  [ ] Left    Coronary circulation: There was 3-vessel coronary artery disease (LAD, RCA, and circumflex). Left main: Angiography showed moderate atherosclerosis predominantly in distal half of the vessel, no clinical lesions appreciated. LAD: Angiography showed multiple discrete lesions. Ostial LAD: There was a tubular 50 % stenosis. Proximal LAD: There was a tubular 99 % stenosis. There was KEN grade 2 flow through the vessel (partial perfusion). This lesion is a chronic total occlusion. Mid LAD: There was a 100 % stenosis. There was KEN grade 0 flow through the vessel (no flow). 1st diagonal: The vessel was small sized. Angiography showed moderate atherosclerosis with no clinical lesions appreciated. 2nd diagonal: The vessel was small to medium sized. Angiography showed multiple discrete lesions. There was a tubular 60 % stenosis in the proximal third of the vessel segment. In a second lesion, there was a tubular 50 % stenosis. Distal vessel angiography showed severe diffuse disease. Proximal circumflex: Normal. Mid circumflex: Angiography showed mild atherosclerosis with no flow limiting lesions. Distal circumflex: There was a tubular 60 % stenosis at the origin of OM2. Distal vessel angiography showed severe diffuse disease. 1st obtuse marginal: The vessel was small sized. Angiography showed severe atherosclerosis. 2nd obtuse marginal: The vessel was medium sized. There was a discrete 90 % stenosis in the proximal third of the vessel segment. 3rd obtuse marginal: Normal. RCA: The vessel was large sized. There was a 100 % stenosis in the proximal third of the vessel segment. There was KEN grade 1 flow through the vessel (slow flow without perfusion). This lesion is a chronic total occlusion. Right PDA: The distal vessel was supplied by collaterals from septal branches of LAD.                   Follow-up Care:  [x] Return to In-patient bed  [x] CT Surgery consult called  [x] hold plavix and continue with ASA 81mg, statin, BB, ACEI  [x ] intensive medical management  Monitor BUN/Cr  CT surgery consultation for CABG

## 2019-09-05 NOTE — PROGRESS NOTE ADULT - ASSESSMENT
IMPRESSION  47 yo M with HIV on ART, DM, DLD, recent cavity filling 8/26, presents to the ED for worsened left lower molar pain for 1 week, found to have ST elevations V2-3 and elevated CE, fever    ASSESSMENT/RECOMMENDATION  #NSTEMI. ST elevations V1-3 (seen on previous EKG at outpatient Cards) and elevated CE, ischemic vs focal myocarditis. CE now downtrending. Pt denies any chest pain. Unclear if tooth pain is a manifestation of cardiac issues as occurred one week prior and pt was diagnosed with a true tooth pathology s/p cavity filling 8/26. Recent EKG/Stress test wnl.   - Cardiology following Dr. Griffin, pending Cath tomorrow  - Trend EKG and Shaneka  - clopidogrel Tablet 75 milliGRAM(s) Oral daily  - heparin  Infusion 1350 Unit(s)/Hr (13.5 mL/Hr) IV Continuous <Continuous>  - aspirin enteric coated 81 milliGRAM(s) Oral daily  - TTE with decreased LVEF compared to previous, ischemic CM    #Tooth ache with L-neck swelling. Fevers. CT neck wnl, focal air lower molar likely 2/2 procedure.   Repeat 9/4 CT neck Mild submandibular infiltrative changes greater on the left which can be seen in the setting of cellulitis. No evidence of abscess.  - All BCX NGTD  - Continue ampicillin/sulbactam  IVPB 3 Gram(s) IV Intermittent every 6 hours, will plan to complete course with PO augmentin on D/C  - ENT consult appreciated  - Appreciate OMFS consult    #HLD  - Discuss need for high dose statin with Cards, on atorvastatin 40 milliGRAM(s) Oral at bedtime    #Asymptomatic HIV 5/2019 CD4 670, VL UD. Well-controlled  - abacavir 600 mG/dolutegravir 50 mG/lamiVUDine 300 mG 1 Tablet(s) Oral daily  - Follows with Jewels Guadalupe    #HTN  - metoprolol tartrate 25 milliGRAM(s) Oral two times a day  - no ace/arb per Cards    #GERD  - pantoprazole    Tablet 40 milliGRAM(s) Oral before breakfast    Spectra [id number] IMPRESSION  47 yo M with HIV on ART, DM, DLD, recent cavity filling 8/26, presents to the ED for worsened left lower molar pain for 1 week, found to have ST elevations V2-3 and elevated CE, fever    ASSESSMENT/RECOMMENDATION  #Multivessel disease, pending CABG. ST elevations V1-3 (seen on previous EKG at outpatient Cards) and elevated CE, ischemic vs focal myocarditis. CE now downtrending. Pt denies any chest pain. Unclear if tooth pain is a manifestation of cardiac issues as occurred one week prior and pt was diagnosed with a true tooth pathology s/p cavity filling 8/26. Recent EKG/Stress test wnl.   - Cardiology following Dr. Griffin  - Trend EKG and Shaneka  - OFF clopidogrel Tablet 75 milliGRAM(s) Oral daily  - aspirin enteric coated 81 milliGRAM(s) Oral daily  - TTE with decreased LVEF compared to previous, ischemic CM    #Tooth ache with L-neck swelling. Fevers. CT neck wnl, focal air lower molar likely 2/2 procedure.   Repeat 9/4 CT neck Mild submandibular infiltrative changes greater on the left which can be seen in the setting of cellulitis. No evidence of abscess.  - Dental consult  - All BCX NGTD  - Continue ampicillin/sulbactam  IVPB 3 Gram(s) IV Intermittent every 6 hours (9/2-, plan for 7 day course pending clinical response)  - ENT consult appreciated  - Appreciate OMFS consult    #Thrombocytopenia  - were within NL on previous testing in [place]   - Peripheral smear    #HLD  - Discuss need for high dose statin with Cards, on atorvastatin 40 milliGRAM(s) Oral at bedtime    #Asymptomatic HIV 5/2019 CD4 670, VL UD. Well-controlled  - abacavir 600 mG/dolutegravir 50 mG/lamiVUDine 300 mG 1 Tablet(s) Oral daily  - Follows with Jewels Guadalupe    #HTN  - metoprolol tartrate 25 milliGRAM(s) Oral two times a day  - no ace/arb per Cards    #GERD  - pantoprazole    Tablet 40 milliGRAM(s) Oral before breakfast    Spectra [id number] no radiation

## 2019-09-05 NOTE — CONSULT NOTE ADULT - SUBJECTIVE AND OBJECTIVE BOX
Patient is a 48y old  Male who presents with a chief complaint of Fevers and left-sided tooth/facial pain (05 Sep 2019 13:27)      HPI:  49 y/o M with PMH of HIV (on treatment), DM, DLD, presents to the ED for left tooth pain for 1 week. He says the pain is left sided and radiates up to his head. It is shock-like and pulsatile in nature. Originates at angle of jaw. Made worse by chewing food and palpation of outside of his jaw. He was recently seen by dentist and had a filling placed and was told he would need to have his left wisdom teeth extracted. He has an appointment scheduled with them for Tuesday. He had a fever at home to up to 105 since last night. He took motrin 800mg 10 am and tylenol 500mg 3 pm with minimal relieft of his pain. He went to a walk in clinic where he received amoxicillin and told to come to the ED. He denies changes in vision or hearing, denies neck pain or cough, no SOB or dysphagia/odynophagia. He denies chest pain, SOB, palpitations, abdominal pain, nausea/vomiting/constipation, urinary symptoms or lower extremity swelling. He has HIV with 0 viral load and normal CD4, as per patient.     In ED, STEMI code was called for ST elevation on V1-V3. Stat enzymes were positive. Patient denied chest pain, palpitations, or SOB, and recently had a stress test and echo by Dr Griffin for abnormal EKG findings. He reports workup was negative. Seen by cardiology fellow, Dr Ken and upgrade to CCU approved by Dr Sloan. (02 Sep 2019 01:07)      PAST MEDICAL & SURGICAL HISTORY:  Diabetes  Hypercholesteremia  HIV (human immunodeficiency virus infection)  History of appendectomy    MEDICATIONS  (STANDING):  abacavir 600 mG/dolutegravir 50 mG/lamiVUDine 300 mG 1 Tablet(s) Oral daily  ampicillin/sulbactam  IVPB 3 Gram(s) IV Intermittent every 6 hours  ampicillin/sulbactam  IVPB      aspirin enteric coated 81 milliGRAM(s) Oral daily  atorvastatin 40 milliGRAM(s) Oral at bedtime  chlorhexidine 4% Liquid 1 Application(s) Topical <User Schedule>  enoxaparin Injectable 40 milliGRAM(s) SubCutaneous daily  metoprolol tartrate 25 milliGRAM(s) Oral two times a day  pantoprazole    Tablet 40 milliGRAM(s) Oral before breakfast  sodium chloride 0.9%. 1000 milliLiter(s) (75 mL/Hr) IV Continuous <Continuous>    MEDICATIONS  (PRN):  acetaminophen   Tablet .. 650 milliGRAM(s) Oral every 6 hours PRN Temp greater or equal to 38C (100.4F)  acetaminophen   Tablet .. 650 milliGRAM(s) Oral every 6 hours PRN Mild Pain (1 - 3)  traMADol 50 milliGRAM(s) Oral three times a day PRN Moderate Pain (4 - 6)    Allergies    No Known Allergies    Intolerances    FAMILY HISTORY:  FH: diabetes mellitus: mother and father  FH: heart failure: mother    Vital Signs Last 24 Hrs  T(C): 36 (05 Sep 2019 13:45), Max: 36.4 (05 Sep 2019 05:38)  T(F): 96.8 (05 Sep 2019 13:45), Max: 97.6 (05 Sep 2019 05:38)  HR: 61 (05 Sep 2019 13:45) (61 - 84)  BP: 107/67 (05 Sep 2019 13:45) (107/67 - 133/80)  BP(mean): 89 (04 Sep 2019 18:25) (89 - 89)  RR: 18 (05 Sep 2019 13:45) (18 - 18)  SpO2: --    LABS:                        12.1   4.32  )-----------( 100      ( 04 Sep 2019 06:00 )             35.5     09-04    138  |  101  |  9<L>  ----------------------------<  141<H>  3.7   |  21  |  0.7    Ca    8.5      04 Sep 2019 06:00  Phos  3.7     09-04  Mg     2.0     09-04    TPro  6.3  /  Alb  3.7  /  TBili  0.7  /  DBili  x   /  AST  28  /  ALT  37  /  AlkPhos  42  09-04    INR: 1.13 ratio [0.65 - 1.30] (09-04 @ 20:00)  WBC Count: 4.32 K/uL <L> [4.80 - 10.80] (09-04 @ 06:00)  Platelet Count - Automated: 100 K/uL <L> [130 - 400] (09-04 @ 06:00)  INR: 1.21 ratio [0.65 - 1.30] (09-04 @ 06:00)  Platelet Count - Automated: 85 K/uL <L> [130 - 400] (09-03 @ 04:33)  WBC Count: 5.44 K/uL [4.80 - 10.80] (09-03 @ 04:33)  Culture Results:   No growth (09-03 @ 00:00)        PT/INR - ( 04 Sep 2019 20:00 )   PT: 13.00 sec;   INR: 1.13 ratio         PTT - ( 04 Sep 2019 06:00 )  PTT:64.7 sec  EOE:  TMJ ( -  ) clicks                     (  - ) pops                     ( -  ) crepitus             Mandible FROM             Facial bones and MOM grossly intact             (  - ) trismus             ( -  ) lymphadenopathy             ( -  ) swelling             ( -  ) asymmetry             ( -  ) palpation             ( -  ) dyspnea             ( -  ) dysphagia             ( -  ) loss of consciousness    IOE:  permanent/primary/mixed>> dentition: grossly intact teeth           hard/soft palate:  (  - ) palatal torus, No pathology noted           tongue/FOM No pathology noted           labial/buccal mucosa No pathology noted           (  + ) percussion           (  + ) palpation           ( -  ) swelling            (  - ) abscess           ( -  ) sinus tract    *DENTAL RADIOGRAPHS: 1 panoramic radiograph     *ASSESSMENT: Periodontal infection associated with pocketing adjacent to #16 and #17. No caries noted, periodontal pocketing noted around #16 and #17. Discomfort upon probing consistent with patients chief complaint. Gingiva tender to palpation and erythematous. Teeth #17 tender to percussion. No swelling noted.       *PLAN: Recommend extraction of #16 and #17. Consult with OMFS resident. OMFS resident will follow up with patient regarding extractions.        RECOMMENDATIONS:  1) Dental F/U with outpatient dentist for comprehensive dental care.   2) If any difficulty swallowing/breathing, fever occur, return to ER.     Pablo Mcmanus

## 2019-09-05 NOTE — PROGRESS NOTE ADULT - SUBJECTIVE AND OBJECTIVE BOX
HPI  Patient is a 48y old Male who presents with a chief complaint of Fevers and left-sided tooth/facial pain (04 Sep 2019 12:19)    Currently admitted to medicine with the primary diagnosis of Fever     Today is hospital day 4d.     INTERVAL HPI / OVERNIGHT EVENTS:  Patient was examined and seen at bedside. This morning he is resting comfortably in bed and reports no new issues or overnight events. The patient was still complaining of swelling in the left cheek, but denied chest pain, sob, abdominal pain, or changes in BM or urinary habits.     ROS: Otherwise unremarkable     PAST MEDICAL & SURGICAL HISTORY  Diabetes  Hypercholesteremia  HIV (human immunodeficiency virus infection)  History of appendectomy    ALLERGIES  No Known Allergies    MEDICATIONS  STANDING MEDICATIONS  abacavir 600 mG/dolutegravir 50 mG/lamiVUDine 300 mG 1 Tablet(s) Oral daily  ampicillin/sulbactam  IVPB 3 Gram(s) IV Intermittent every 6 hours  ampicillin/sulbactam  IVPB      aspirin enteric coated 81 milliGRAM(s) Oral daily  atorvastatin 40 milliGRAM(s) Oral at bedtime  chlorhexidine 4% Liquid 1 Application(s) Topical <User Schedule>  clopidogrel Tablet 75 milliGRAM(s) Oral daily  heparin  Infusion 1450 Unit(s)/Hr IV Continuous <Continuous>  metoprolol tartrate 25 milliGRAM(s) Oral two times a day  pantoprazole    Tablet 40 milliGRAM(s) Oral before breakfast  sodium chloride 0.9%. 1000 milliLiter(s) IV Continuous <Continuous>    PRN MEDICATIONS  acetaminophen   Tablet .. 650 milliGRAM(s) Oral every 6 hours PRN  acetaminophen   Tablet .. 650 milliGRAM(s) Oral every 6 hours PRN  traMADol 50 milliGRAM(s) Oral three times a day PRN    VITALS:  T(F): 97.6  HR: 61  BP: 133/80  RR: 18  SpO2: 98%    PHYSICAL EXAM  GEN: NAD, Resting comfortably in bed eating breakfast  PULM: Clear to auscultation bilaterally, No wheezes  CVS: Regular rate and rhythm, S1-S2, no murmurs  ABD: Soft, non-tender, non-distended, no guarding  EXT: No edema  HEENT: Swelling in the left cheek at the level of the mandible angle. Tenderness and firmness to palpation of left SM muscle and angle of mandible. Restriction in jaw deviation to the left and jaw opening. No palapble LA, but very firm to palpation along the mastoid attachment of the SCM. No mastoid bone tenderness.  NEURO: AAOx3, no focal deficits      LABS                        12.1   4.32  )-----------( 100      ( 04 Sep 2019 06:00 )             35.5     09-04    138  |  101  |  9<L>  ----------------------------<  141<H>  3.7   |  21  |  0.7    Ca    8.5      04 Sep 2019 06:00  Phos  3.7     09-04  Mg     2.0     09-04    TPro  6.3  /  Alb  3.7  /  TBili  0.7  /  DBili  x   /  AST  28  /  ALT  37  /  AlkPhos  42  09-04    PT/INR - ( 04 Sep 2019 20:00 )   PT: 13.00 sec;   INR: 1.13 ratio         PTT - ( 04 Sep 2019 06:00 )  PTT:64.7 sec          Culture - Urine (collected 03 Sep 2019 00:00)  Source: .Urine Clean Catch (Midstream)  Final Report (04 Sep 2019 10:59):    No growth      CARDIAC MARKERS ( 04 Sep 2019 06:00 )  x     / 0.49 ng/mL / 120 U/L / x     / 4.2 ng/mL  CARDIAC MARKERS ( 03 Sep 2019 11:01 )  x     / 0.33 ng/mL / 185 U/L / x     / 5.9 ng/mL      RADIOLOGY  < from: Xray Chest 2 Views PA/Lat (09.01.19 @ 19:12) >  Impression:      No radiographic evidence of acute cardiopulmonary disease.    < end of copied text >  < from: CT Neck Soft Tissue w/ IV Cont (09.01.19 @ 20:28) >  Impression:     Normal CT scan of the soft tissues of the neck with contrast.    < end of copied text >    < from: Transthoracic Echocardiogram (09.02.19 @ 13:49) >  Summary:   1. Left ventricular ejection fraction, by visual estimation, is 40 to   45%.   2. Mildly to moderately decreased global left ventricular systolic   function.   3. Multiple left ventricular regional wall motion abnormalities exist.   See wall motion findings.   4. Ischemic cardiomyopathy.   5. LA volume Index is 16.0 ml/m² ml/m2.    < end of copied text > HPI  Patient is a 48y old Male who presents with a chief complaint of Fevers and left-sided tooth/facial pain (04 Sep 2019 12:19)    Currently admitted to medicine with the primary diagnosis of Fever     Today is hospital day 4d.     INTERVAL HPI / OVERNIGHT EVENTS:  Patient was send to cardiac catheterization today.    PAST MEDICAL & SURGICAL HISTORY  Diabetes  Hypercholesteremia  HIV (human immunodeficiency virus infection)  History of appendectomy    ALLERGIES  No Known Allergies    MEDICATIONS  STANDING MEDICATIONS  abacavir 600 mG/dolutegravir 50 mG/lamiVUDine 300 mG 1 Tablet(s) Oral daily  ampicillin/sulbactam  IVPB 3 Gram(s) IV Intermittent every 6 hours  ampicillin/sulbactam  IVPB      aspirin enteric coated 81 milliGRAM(s) Oral daily  atorvastatin 40 milliGRAM(s) Oral at bedtime  chlorhexidine 4% Liquid 1 Application(s) Topical <User Schedule>  clopidogrel Tablet 75 milliGRAM(s) Oral daily  heparin  Infusion 1450 Unit(s)/Hr IV Continuous <Continuous>  metoprolol tartrate 25 milliGRAM(s) Oral two times a day  pantoprazole    Tablet 40 milliGRAM(s) Oral before breakfast  sodium chloride 0.9%. 1000 milliLiter(s) IV Continuous <Continuous>    PRN MEDICATIONS  acetaminophen   Tablet .. 650 milliGRAM(s) Oral every 6 hours PRN  acetaminophen   Tablet .. 650 milliGRAM(s) Oral every 6 hours PRN  traMADol 50 milliGRAM(s) Oral three times a day PRN    VITALS:  T(F): 97.6  HR: 61  BP: 133/80  RR: 18  SpO2: 98%    LABS                        12.1   4.32  )-----------( 100      ( 04 Sep 2019 06:00 )             35.5     09-04    138  |  101  |  9<L>  ----------------------------<  141<H>  3.7   |  21  |  0.7    Ca    8.5      04 Sep 2019 06:00  Phos  3.7     09-04  Mg     2.0     09-04    TPro  6.3  /  Alb  3.7  /  TBili  0.7  /  DBili  x   /  AST  28  /  ALT  37  /  AlkPhos  42  09-04    PT/INR - ( 04 Sep 2019 20:00 )   PT: 13.00 sec;   INR: 1.13 ratio         PTT - ( 04 Sep 2019 06:00 )  PTT:64.7 sec          Culture - Urine (collected 03 Sep 2019 00:00)  Source: .Urine Clean Catch (Midstream)  Final Report (04 Sep 2019 10:59):    No growth      CARDIAC MARKERS ( 04 Sep 2019 06:00 )  x     / 0.49 ng/mL / 120 U/L / x     / 4.2 ng/mL  CARDIAC MARKERS ( 03 Sep 2019 11:01 )  x     / 0.33 ng/mL / 185 U/L / x     / 5.9 ng/mL      RADIOLOGY  < from: Xray Chest 2 Views PA/Lat (09.01.19 @ 19:12) >  Impression:      No radiographic evidence of acute cardiopulmonary disease.    < end of copied text >  < from: CT Neck Soft Tissue w/ IV Cont (09.01.19 @ 20:28) >  Impression:     Normal CT scan of the soft tissues of the neck with contrast.    < end of copied text >    < from: Transthoracic Echocardiogram (09.02.19 @ 13:49) >  Summary:   1. Left ventricular ejection fraction, by visual estimation, is 40 to   45%.   2. Mildly to moderately decreased global left ventricular systolic   function.   3. Multiple left ventricular regional wall motion abnormalities exist.   See wall motion findings.   4. Ischemic cardiomyopathy.   5. LA volume Index is 16.0 ml/m² ml/m2.    < end of copied text > HPI  Patient is a 48y old Male who presents with a chief complaint of Fevers and left-sided tooth/facial pain (04 Sep 2019 12:19)    Currently admitted to medicine with the primary diagnosis of Fever     Today is hospital day 4d.     INTERVAL HPI / OVERNIGHT EVENTS:  Patient was send to cardiac catheterization today.    PAST MEDICAL & SURGICAL HISTORY  Diabetes  Hypercholesteremia  HIV (human immunodeficiency virus infection)  History of appendectomy    ALLERGIES  No Known Allergies    MEDICATIONS  STANDING MEDICATIONS  abacavir 600 mG/dolutegravir 50 mG/lamiVUDine 300 mG 1 Tablet(s) Oral daily  ampicillin/sulbactam  IVPB 3 Gram(s) IV Intermittent every 6 hours  ampicillin/sulbactam  IVPB      aspirin enteric coated 81 milliGRAM(s) Oral daily  atorvastatin 40 milliGRAM(s) Oral at bedtime  chlorhexidine 4% Liquid 1 Application(s) Topical <User Schedule>  clopidogrel Tablet 75 milliGRAM(s) Oral daily  heparin  Infusion 1450 Unit(s)/Hr IV Continuous <Continuous>  metoprolol tartrate 25 milliGRAM(s) Oral two times a day  pantoprazole    Tablet 40 milliGRAM(s) Oral before breakfast  sodium chloride 0.9%. 1000 milliLiter(s) IV Continuous <Continuous>    PRN MEDICATIONS  acetaminophen   Tablet .. 650 milliGRAM(s) Oral every 6 hours PRN  acetaminophen   Tablet .. 650 milliGRAM(s) Oral every 6 hours PRN  traMADol 50 milliGRAM(s) Oral three times a day PRN    VITALS:  T(F): 97.6  HR: 61  BP: 133/80  RR: 18  SpO2: 98%    LABS                        12.1   4.32  )-----------( 100      ( 04 Sep 2019 06:00 )             35.5     09-04    138  |  101  |  9<L>  ----------------------------<  141<H>  3.7   |  21  |  0.7    Ca    8.5      04 Sep 2019 06:00  Phos  3.7     09-04  Mg     2.0     09-04    TPro  6.3  /  Alb  3.7  /  TBili  0.7  /  DBili  x   /  AST  28  /  ALT  37  /  AlkPhos  42  09-04    PT/INR - ( 04 Sep 2019 20:00 )   PT: 13.00 sec;   INR: 1.13 ratio         PTT - ( 04 Sep 2019 06:00 )  PTT:64.7 sec          Culture - Urine (collected 03 Sep 2019 00:00)  Source: .Urine Clean Catch (Midstream)  Final Report (04 Sep 2019 10:59):    No growth      CARDIAC MARKERS ( 04 Sep 2019 06:00 )  x     / 0.49 ng/mL / 120 U/L / x     / 4.2 ng/mL  CARDIAC MARKERS ( 03 Sep 2019 11:01 )  x     / 0.33 ng/mL / 185 U/L / x     / 5.9 ng/mL      RADIOLOGY  < from: Xray Chest 2 Views PA/Lat (09.01.19 @ 19:12) >  Impression:      No radiographic evidence of acute cardiopulmonary disease.    < end of copied text >  < from: CT Neck Soft Tissue w/ IV Cont (09.01.19 @ 20:28) >  Impression:     Normal CT scan of the soft tissues of the neck with contrast.    < end of copied text >    < from: Transthoracic Echocardiogram (09.02.19 @ 13:49) >  Summary:   1. Left ventricular ejection fraction, by visual estimation, is 40 to   45%.   2. Mildly to moderately decreased global left ventricular systolic   function.   3. Multiple left ventricular regional wall motion abnormalities exist.   See wall motion findings.   4. Ischemic cardiomyopathy.   5. LA volume Index is 16.0 ml/m² ml/m2.    < end of copied text >    < from: CT Neck Soft Tissue w/ IV Cont (09.04.19 @ 13:17) >  IMPRESSION:    Mild submandibular infiltrative changes greater on the left which can be   seen in the setting of cellulitis. No evidence of abscess.    < end of copied text >

## 2019-09-05 NOTE — PROGRESS NOTE ADULT - ASSESSMENT
1] ACS with atypical anginal symptom (Jaw Pain) - NSTEMI (Not STEMI)      LVEF 40-45%  - Risks, benefits and alternatives of cardiac catheterization again discussed with the patient.  He understands and agrees to have procedure done.  He signed consent  - Further recommendations to follow up pending results of cardiac cath    2] Hyperlipidemia  - Continue medications    3] Type II DM  - Glycemic control    4] HIV  - stable    Plan discussed with patient at bedside.    Remington Griffin MD  [phone] Office

## 2019-09-05 NOTE — PROGRESS NOTE ADULT - SUBJECTIVE AND OBJECTIVE BOX
Patient was seen and examined by me in Cath Lab    He is comfortable in bed.  He offers no new complaints.  He denies any chest pain and shortness of breath    Vitals:  T(C): 36.4 (09-05-19 @ 05:38), Max: 36.7 (09-03-19 @ 12:00)  HR: 61 (09-05-19 @ 05:38) (61 - 84)  BP: 133/80 (09-05-19 @ 05:38) (93/52 - 133/80)  RR: 18 (09-05-19 @ 05:38) (16 - 21)  SpO2: 98% (09-04-19 @ 07:55) (98% - 98%)    Telemetry: Sinus Rhythm    LABS:                        12.1   4.32  )-----------( 100      ( 04 Sep 2019 06:00 )             35.5     09-04    138  |  101  |  9<L>  ----------------------------<  141<H>  3.7   |  21  |  0.7    Ca    8.5      04 Sep 2019 06:00  Phos  3.7     09-04  Mg     2.0     09-04    TPro  6.3  /  Alb  3.7  /  TBili  0.7  /  DBili  x   /  AST  28  /  ALT  37  /  AlkPhos  42  09-04    PT/INR - ( 04 Sep 2019 20:00 )   PT: 13.00 sec;   INR: 1.13 ratio         PTT - ( 04 Sep 2019 06:00 )  PTT:64.7 sec  MEDICATIONS  (STANDING):  abacavir 600 mG/dolutegravir 50 mG/lamiVUDine 300 mG 1 Tablet(s) Oral daily  ampicillin/sulbactam  IVPB 3 Gram(s) IV Intermittent every 6 hours  ampicillin/sulbactam  IVPB      aspirin enteric coated 81 milliGRAM(s) Oral daily  atorvastatin 40 milliGRAM(s) Oral at bedtime  chlorhexidine 4% Liquid 1 Application(s) Topical <User Schedule>  clopidogrel Tablet 75 milliGRAM(s) Oral daily  heparin  Infusion 1450 Unit(s)/Hr (14.5 mL/Hr) IV Continuous <Continuous>  metoprolol tartrate 25 milliGRAM(s) Oral two times a day  pantoprazole    Tablet 40 milliGRAM(s) Oral before breakfast  sodium chloride 0.9%. 1000 milliLiter(s) (75 mL/Hr) IV Continuous <Continuous>    MEDICATIONS  (PRN):  acetaminophen   Tablet .. 650 milliGRAM(s) Oral every 6 hours PRN Temp greater or equal to 38C (100.4F)  acetaminophen   Tablet .. 650 milliGRAM(s) Oral every 6 hours PRN Mild Pain (1 - 3)  traMADol 50 milliGRAM(s) Oral three times a day PRN Moderate Pain (4 - 6)      PHYSICAL EXAM:  Not in distress  Alert, oriented x 3  No JVD; nl S1S2; no murmur  Bilateral breath sounds  Abdomen soft  No edema  No focal neurologic deficit

## 2019-09-05 NOTE — PROGRESS NOTE ADULT - ASSESSMENT
47 y/o M with PMH of HIV (on treatment), DM, DLD, presents to the ED for left tooth pain for 1 week. Code STEMI called in ED for ST elevation and positive cardiac enzymes.     #STEMI V2-V3 with elevated CE  - Cardiology following Dr. Griffin - scheduled for cath today  - EKG showed ST-elevations, however unchanged from previous - making this an NSTEMI as per Dr. Griffin  - clopidogrel Tablet 75 milliGRAM(s) Oral daily  - heparin 1350 units. Follow PTT  - aspirin enteric coated 81 milliGRAM(s) Oral daily  - Check Coxsackie Abs- pending  - Echo showed: EF 40-45%, ischemic CM, and wall motion abnormalities    #Toothache with L-neck swelling. Post procedural (likely) vs. Lemierre's (doubt)  - Fevers. CT neck wnl, focal air lower molar likely 2/2 procedure  - BCX: NGTD  - Continue ampicillin/sulbactam  IVPB 3 Gram(s) IV Intermittent every 6 hours  - ENT consult as worsened L neck swelling - recommending repeat of CT neck and soft tissues - performed, pending result.  - Warm compress to site  - Appreciate OMFS consult: no intervention at this time  - When stable and discharged, please have pt call Dr. Shaver at [phone] to reschedule oral surgery appointment  - HIV positive    #HLD  - Discuss need for high dose statin with Cards, on atorvastatin 40 milliGRAM(s) Oral at bedtime    #Asymptomatic HIV 5/2019 CD4 670, VL UD. Well-controlled  - abacavir 600 mG/dolutegravir 50 mG/lamiVUDine 300 mG 1 Tablet(s) Oral daily  - Follows with Jewels Guadalupe    #HTN  - metoprolol tartrate 25 milliGRAM(s) Oral two times a day    #GERD  - pantoprazole    Tablet 40 milliGRAM(s) Oral before breakfast.    dvt ppx: lovenox  ambulate as tolerated  dispo: from home  FULL CODE 49 y/o M with PMH of HIV (on treatment), DM, DLD, presents to the ED for left tooth pain for 1 week. Code STEMI called in ED for ST elevation and positive cardiac enzymes.     #STEMI V2-V3 with elevated CE  - Cardiology following Dr. Griffin - scheduled for cath today - will f/u finalized report  - EKG showed ST-elevations, however unchanged from previous - making this an NSTEMI as per Dr. Griffin  - clopidogrel Tablet 75 milliGRAM(s) Oral daily  - heparin 1350 units. Follow PTT  - aspirin enteric coated 81 milliGRAM(s) Oral daily  - Check Coxsackie Abs- pending  - Echo showed: EF 40-45%, ischemic CM, and wall motion abnormalities    #Toothache with L-neck swelling. Post procedural (likely) vs. Lemierre's (doubt)  - Fevers. CT neck wnl, focal air lower molar likely 2/2 procedure  - BCX: NGTD  - Continue ampicillin/sulbactam  IVPB 3 Gram(s) IV Intermittent every 6 hours  - ENT consult as worsened L neck swelling - recommending repeat of CT neck and soft tissues - performed, pending result.  - Warm compress to site  - Appreciate OMFS consult: no intervention at this time  - When stable and discharged, please have pt call Dr. Shaver at [phone] to reschedule oral surgery appointment  - HIV positive    #HLD  - Discuss need for high dose statin with Cards, on atorvastatin 40 milliGRAM(s) Oral at bedtime    #Asymptomatic HIV 5/2019 CD4 670, VL UD. Well-controlled  - abacavir 600 mG/dolutegravir 50 mG/lamiVUDine 300 mG 1 Tablet(s) Oral daily  - Follows with Jewels Guadalupe    #HTN  - metoprolol tartrate 25 milliGRAM(s) Oral two times a day    #GERD  - pantoprazole    Tablet 40 milliGRAM(s) Oral before breakfast.    dvt ppx: lovenox  ambulate as tolerated  dispo: from home  FULL CODE 49 y/o M with PMH of HIV (on treatment), DM, DLD, presents to the ED for left tooth pain for 1 week. Code STEMI called in ED for ST elevation and positive cardiac enzymes.     #STEMI V2-V3 with elevated CE  - Cardiology following Dr. Griffin - scheduled for cath today - will f/u finalized report  - EKG showed ST-elevations, however unchanged from previous - making this an NSTEMI as per Dr. Griffin  - clopidogrel Tablet 75 milliGRAM(s) Oral daily  - heparin 1350 units. Follow PTT  - aspirin enteric coated 81 milliGRAM(s) Oral daily  - Check Coxsackie Abs- pending  - Echo showed: EF 40-45%, ischemic CM, and wall motion abnormalities    #Toothache with L-neck swelling. Post procedural (likely) vs. Lemierre's (doubt)  - Fevers. CT neck wnl, focal air lower molar likely 2/2 procedure  - BCX: NGTD  - Continue ampicillin/sulbactam  IVPB 3 Gram(s) IV Intermittent every 6 hours  - ENT consult as worsened L neck swelling - recommending repeat of CT neck and soft tissues - findings consistent with cellulitis. No abscess.  - Warm compress to site  - Appreciate OMFS consult: no intervention at this time  - When stable and discharged, please have pt call Dr. Shaver at [phone] to reschedule oral surgery appointment  - HIV positive    #HLD  - Discuss need for high dose statin with Cards, on atorvastatin 40 milliGRAM(s) Oral at bedtime    #Asymptomatic HIV 5/2019 CD4 670, VL UD. Well-controlled  - abacavir 600 mG/dolutegravir 50 mG/lamiVUDine 300 mG 1 Tablet(s) Oral daily  - Follows with Jewels Guadalupe    #HTN  - metoprolol tartrate 25 milliGRAM(s) Oral two times a day    #GERD  - pantoprazole    Tablet 40 milliGRAM(s) Oral before breakfast.    dvt ppx: lovenox  ambulate as tolerated  dispo: from home  FULL CODE

## 2019-09-06 LAB
ANION GAP SERPL CALC-SCNC: 15 MMOL/L — HIGH (ref 7–14)
BASOPHILS # BLD AUTO: 0.02 K/UL — SIGNIFICANT CHANGE UP (ref 0–0.2)
BASOPHILS NFR BLD AUTO: 0.4 % — SIGNIFICANT CHANGE UP (ref 0–1)
BUN SERPL-MCNC: 11 MG/DL — SIGNIFICANT CHANGE UP (ref 10–20)
CALCIUM SERPL-MCNC: 9 MG/DL — SIGNIFICANT CHANGE UP (ref 8.5–10.1)
CHLORIDE SERPL-SCNC: 102 MMOL/L — SIGNIFICANT CHANGE UP (ref 98–110)
CO2 SERPL-SCNC: 21 MMOL/L — SIGNIFICANT CHANGE UP (ref 17–32)
CREAT SERPL-MCNC: 0.8 MG/DL — SIGNIFICANT CHANGE UP (ref 0.7–1.5)
CULTURE RESULTS: SIGNIFICANT CHANGE UP
CULTURE RESULTS: SIGNIFICANT CHANGE UP
CV B1 AB TITR FLD: NEGATIVE — SIGNIFICANT CHANGE UP
CV B2 AB TITR FLD: NEGATIVE — SIGNIFICANT CHANGE UP
CV B3 AB TITR FLD: NEGATIVE — SIGNIFICANT CHANGE UP
CV B4 AB TITR FLD: NEGATIVE — SIGNIFICANT CHANGE UP
CV B5 AB TITR FLD: NEGATIVE — SIGNIFICANT CHANGE UP
CV B6 AB TITR FLD: NEGATIVE — SIGNIFICANT CHANGE UP
EOSINOPHIL # BLD AUTO: 0.2 K/UL — SIGNIFICANT CHANGE UP (ref 0–0.7)
EOSINOPHIL NFR BLD AUTO: 3.6 % — SIGNIFICANT CHANGE UP (ref 0–8)
ESTIMATED AVERAGE GLUCOSE: 134 MG/DL — HIGH (ref 68–114)
FOLATE SERPL-MCNC: 15.9 NG/ML — SIGNIFICANT CHANGE UP
GLUCOSE BLDC GLUCOMTR-MCNC: 164 MG/DL — HIGH (ref 70–99)
GLUCOSE BLDC GLUCOMTR-MCNC: 165 MG/DL — HIGH (ref 70–99)
GLUCOSE SERPL-MCNC: 128 MG/DL — HIGH (ref 70–99)
HBA1C BLD-MCNC: 6.3 % — HIGH (ref 4–5.6)
HCT VFR BLD CALC: 34.9 % — LOW (ref 42–52)
HGB BLD-MCNC: 11.9 G/DL — LOW (ref 14–18)
IMM GRANULOCYTES NFR BLD AUTO: 0.5 % — HIGH (ref 0.1–0.3)
LYMPHOCYTES # BLD AUTO: 2.15 K/UL — SIGNIFICANT CHANGE UP (ref 1.2–3.4)
LYMPHOCYTES # BLD AUTO: 38.9 % — SIGNIFICANT CHANGE UP (ref 20.5–51.1)
MCHC RBC-ENTMCNC: 29 PG — SIGNIFICANT CHANGE UP (ref 27–31)
MCHC RBC-ENTMCNC: 34.1 G/DL — SIGNIFICANT CHANGE UP (ref 32–37)
MCV RBC AUTO: 85.1 FL — SIGNIFICANT CHANGE UP (ref 80–94)
MONOCYTES # BLD AUTO: 0.48 K/UL — SIGNIFICANT CHANGE UP (ref 0.1–0.6)
MONOCYTES NFR BLD AUTO: 8.7 % — SIGNIFICANT CHANGE UP (ref 1.7–9.3)
NEUTROPHILS # BLD AUTO: 2.64 K/UL — SIGNIFICANT CHANGE UP (ref 1.4–6.5)
NEUTROPHILS NFR BLD AUTO: 47.9 % — SIGNIFICANT CHANGE UP (ref 42.2–75.2)
NRBC # BLD: 0 /100 WBCS — SIGNIFICANT CHANGE UP (ref 0–0)
NT-PROBNP SERPL-SCNC: 244 PG/ML — SIGNIFICANT CHANGE UP (ref 0–300)
PLATELET # BLD AUTO: 129 K/UL — LOW (ref 130–400)
POTASSIUM SERPL-MCNC: 4 MMOL/L — SIGNIFICANT CHANGE UP (ref 3.5–5)
POTASSIUM SERPL-SCNC: 4 MMOL/L — SIGNIFICANT CHANGE UP (ref 3.5–5)
PREALB SERPL-MCNC: 16 MG/DL — LOW (ref 20–40)
RBC # BLD: 4.1 M/UL — LOW (ref 4.7–6.1)
RBC # FLD: 12.4 % — SIGNIFICANT CHANGE UP (ref 11.5–14.5)
SODIUM SERPL-SCNC: 138 MMOL/L — SIGNIFICANT CHANGE UP (ref 135–146)
SPECIMEN SOURCE: SIGNIFICANT CHANGE UP
SPECIMEN SOURCE: SIGNIFICANT CHANGE UP
T3 SERPL-MCNC: 101 NG/DL — SIGNIFICANT CHANGE UP (ref 80–200)
T4 AB SER-ACNC: 6.9 UG/DL — SIGNIFICANT CHANGE UP (ref 4.6–12)
TROPONIN T SERPL-MCNC: 0.21 NG/ML — CRITICAL HIGH
TSH SERPL-MCNC: 2.87 UIU/ML — SIGNIFICANT CHANGE UP (ref 0.27–4.2)
WBC # BLD: 5.52 K/UL — SIGNIFICANT CHANGE UP (ref 4.8–10.8)
WBC # FLD AUTO: 5.52 K/UL — SIGNIFICANT CHANGE UP (ref 4.8–10.8)

## 2019-09-06 PROCEDURE — 99231 SBSQ HOSP IP/OBS SF/LOW 25: CPT

## 2019-09-06 RX ORDER — OXYCODONE AND ACETAMINOPHEN 5; 325 MG/1; MG/1
1 TABLET ORAL EVERY 4 HOURS
Refills: 0 | Status: DISCONTINUED | OUTPATIENT
Start: 2019-09-06 | End: 2019-09-06

## 2019-09-06 RX ORDER — OXYCODONE AND ACETAMINOPHEN 5; 325 MG/1; MG/1
2 TABLET ORAL EVERY 4 HOURS
Refills: 0 | Status: DISCONTINUED | OUTPATIENT
Start: 2019-09-06 | End: 2019-09-06

## 2019-09-06 RX ORDER — HEPARIN SODIUM 5000 [USP'U]/ML
5000 INJECTION INTRAVENOUS; SUBCUTANEOUS EVERY 8 HOURS
Refills: 0 | Status: DISCONTINUED | OUTPATIENT
Start: 2019-09-07 | End: 2019-09-09

## 2019-09-06 RX ORDER — EMTRICITABINE AND TENOFOVIR DISOPROXIL FUMARATE 200; 300 MG/1; MG/1
1 TABLET, FILM COATED ORAL DAILY
Refills: 0 | Status: DISCONTINUED | OUTPATIENT
Start: 2019-09-07 | End: 2019-09-07

## 2019-09-06 RX ORDER — OXYCODONE AND ACETAMINOPHEN 5; 325 MG/1; MG/1
2 TABLET ORAL EVERY 4 HOURS
Refills: 0 | Status: DISCONTINUED | OUTPATIENT
Start: 2019-09-06 | End: 2019-09-10

## 2019-09-06 RX ORDER — OXYCODONE AND ACETAMINOPHEN 5; 325 MG/1; MG/1
1 TABLET ORAL ONCE
Refills: 0 | Status: DISCONTINUED | OUTPATIENT
Start: 2019-09-06 | End: 2019-09-06

## 2019-09-06 RX ORDER — AMPICILLIN SODIUM AND SULBACTAM SODIUM 250; 125 MG/ML; MG/ML
3 INJECTION, POWDER, FOR SUSPENSION INTRAMUSCULAR; INTRAVENOUS EVERY 6 HOURS
Refills: 0 | Status: DISCONTINUED | OUTPATIENT
Start: 2019-09-06 | End: 2019-09-10

## 2019-09-06 RX ADMIN — Medication 25 MILLIGRAM(S): at 06:29

## 2019-09-06 RX ADMIN — ATORVASTATIN CALCIUM 40 MILLIGRAM(S): 80 TABLET, FILM COATED ORAL at 22:12

## 2019-09-06 RX ADMIN — OXYCODONE AND ACETAMINOPHEN 2 TABLET(S): 5; 325 TABLET ORAL at 23:00

## 2019-09-06 RX ADMIN — OXYCODONE AND ACETAMINOPHEN 1 TABLET(S): 5; 325 TABLET ORAL at 16:38

## 2019-09-06 RX ADMIN — AMPICILLIN SODIUM AND SULBACTAM SODIUM 200 GRAM(S): 250; 125 INJECTION, POWDER, FOR SUSPENSION INTRAMUSCULAR; INTRAVENOUS at 12:45

## 2019-09-06 RX ADMIN — Medication 81 MILLIGRAM(S): at 12:46

## 2019-09-06 RX ADMIN — OXYCODONE AND ACETAMINOPHEN 1 TABLET(S): 5; 325 TABLET ORAL at 22:36

## 2019-09-06 RX ADMIN — Medication 25 MILLIGRAM(S): at 18:36

## 2019-09-06 RX ADMIN — PANTOPRAZOLE SODIUM 40 MILLIGRAM(S): 20 TABLET, DELAYED RELEASE ORAL at 06:29

## 2019-09-06 RX ADMIN — TRAMADOL HYDROCHLORIDE 50 MILLIGRAM(S): 50 TABLET ORAL at 14:30

## 2019-09-06 RX ADMIN — AMPICILLIN SODIUM AND SULBACTAM SODIUM 200 GRAM(S): 250; 125 INJECTION, POWDER, FOR SUSPENSION INTRAMUSCULAR; INTRAVENOUS at 18:36

## 2019-09-06 RX ADMIN — OXYCODONE AND ACETAMINOPHEN 1 TABLET(S): 5; 325 TABLET ORAL at 16:00

## 2019-09-06 RX ADMIN — OXYCODONE AND ACETAMINOPHEN 1 TABLET(S): 5; 325 TABLET ORAL at 22:30

## 2019-09-06 RX ADMIN — OXYCODONE AND ACETAMINOPHEN 1 TABLET(S): 5; 325 TABLET ORAL at 22:02

## 2019-09-06 RX ADMIN — TRAMADOL HYDROCHLORIDE 50 MILLIGRAM(S): 50 TABLET ORAL at 14:00

## 2019-09-06 RX ADMIN — AMPICILLIN SODIUM AND SULBACTAM SODIUM 200 GRAM(S): 250; 125 INJECTION, POWDER, FOR SUSPENSION INTRAMUSCULAR; INTRAVENOUS at 06:26

## 2019-09-06 NOTE — PROGRESS NOTE ADULT - SUBJECTIVE AND OBJECTIVE BOX
This is  late entry.  Patient was seen and examined by me on 3C.      He appears comfortable in bed.  No new complaints.      Vitals:  T(C): 36.1 (09-06-19 @ 22:00), Max: 36.8 (09-05-19 @ 21:09)  HR: 74 (09-06-19 @ 22:00) (61 - 74)  BP: 158/84 (09-06-19 @ 22:00) (107/67 - 158/84)  RR: 18 (09-06-19 @ 13:34) (18 - 18)  SpO2: --    Telemetry: Sinus Rhythm    LABS:                        11.9   5.52  )-----------( 129      ( 06 Sep 2019 06:02 )             34.9     09-06    138  |  102  |  11  ----------------------------<  128<H>  4.0   |  21  |  0.8    Ca    9.0      06 Sep 2019 06:02        MEDICATIONS  (STANDING):  abacavir 600 mG/dolutegravir 50 mG/lamiVUDine 300 mG 1 Tablet(s) Oral daily  ampicillin/sulbactam  IVPB 3 Gram(s) IV Intermittent every 6 hours  aspirin enteric coated 81 milliGRAM(s) Oral daily  atorvastatin 40 milliGRAM(s) Oral at bedtime  chlorhexidine 4% Liquid 1 Application(s) Topical <User Schedule>  metoprolol tartrate 25 milliGRAM(s) Oral two times a day  pantoprazole    Tablet 40 milliGRAM(s) Oral before breakfast  sodium chloride 0.9%. 1000 milliLiter(s) (75 mL/Hr) IV Continuous <Continuous>    MEDICATIONS  (PRN):  acetaminophen   Tablet .. 650 milliGRAM(s) Oral every 6 hours PRN Temp greater or equal to 38C (100.4F)  acetaminophen   Tablet .. 650 milliGRAM(s) Oral every 6 hours PRN Mild Pain (1 - 3)  oxyCODONE    5 mG/acetaminophen 325 mG 2 Tablet(s) Oral every 4 hours PRN Severe Pain (7 - 10)  traMADol 50 milliGRAM(s) Oral three times a day PRN Moderate Pain (4 - 6)      PHYSICAL EXAM:  Constitutional: appears stated age, well developed/nourished, no acute distress  Eyes: EOM's intact.  PERRLA  ENMT: Normocephalic, atraumatic.    Neck: Jugular veins non-distended; no carotid bruits bilaterally.  Respiratory: respiratory pattern unlabored; no dullness to percussion; lungs clear to auscultation bilaterally.  Cardiovascular: Regular rhythm.  S1 and S2 normal.  No murmur nor rub appreciated.  Abdomen: Soft, non-tender.  Normal bowel sounds.  Extremities: extremities warm; no cyanosis, clubbing or edema.  Pulses: Intact bilaterally  Skin: No gross abnormalities noted.  Musculoskeletal: No gross deformities  Neurological: Alert, oriented x 3.  No focal neurologic deficits noted.

## 2019-09-06 NOTE — CONSULT NOTE ADULT - ASSESSMENT
A/P: 48M pmhx DM/HIV/HLD with left mandibular odontogenic pain (Tooth #17), likely odontogenic etiology vs atypical pain from NSTEMI. Carious #16 and periodontally involved #17 extracted without complications 9/6 in dental clinic. Currently in pre-op for CABG by CT surgery. Remains afebrile. Hemodynamically stable.     #Odontogenic pain (teeth #16 carious, #17 periodontally involved) s/p dental extractions 9/6    -No further acute intervention per dental/omfs. Pt is cleared from dental perspective  -Resume lovenox starting tomorrow. Aspirin is okay for today  -Keep gauze pressure pack on for 1 hour, change as needed. Dressing supply and instructions given to pt.  -Continue antibiotics per ID  -No spitting/drinking through straw for next 24 hours  -No rinsing mouth today, patient may begin salt water rinses bid beginning tomorrow  -Pain control   -Soft diet  -Thank you for the consult. Please page dental if any questions or concerns    Seen by residents Pablo Mcmanus DDS and Matias Reed DMD

## 2019-09-06 NOTE — PROGRESS NOTE ADULT - ASSESSMENT
47 y/o M with PMH of HIV (on treatment), DM, DLD, presents to the ED for left tooth pain for 1 week. Code STEMI called in ED for ST elevation and positive cardiac enzymes.     #STEMI V2-V3 with elevated CE  - s/p cath, which revealed three vessel disease in need of CABG. CTS is on the case and scheduling for sometime early next week.  - EKG showed ST-elevations, however unchanged from previous - making this an NSTEMI as per Dr. Griffin  - clopidogrel Tablet 75 milliGRAM(s) Oral daily  - heparin 1350 units. Follow PTT  - aspirin enteric coated 81 milliGRAM(s) Oral daily  - Check Coxsackie Abs- pending  - Echo showed: EF 40-45%, ischemic CM, and wall motion abnormalities    #Toothache with L-neck swelling. Post procedural (likely) vs. Lemierre's (doubt)  - Fevers. CT neck wnl, focal air lower molar likely 2/2 procedure  - BCX: NGTD  - Continue ampicillin/sulbactam  IVPB 3 Gram(s) IV Intermittent every 6 hours  - ENT consult as worsened L neck swelling - recommending repeat of CT neck and soft tissues - findings consistent with cellulitis. No abscess.  - Warm compress to site  - Appreciate OMFS consult: no intervention at this time  - When stable and discharged, please have pt call Dr. Shaver at [phone] to reschedule oral surgery appointment  - HIV positive    #HLD  - Discuss need for high dose statin with Cards, on atorvastatin 40 milliGRAM(s) Oral at bedtime    #Asymptomatic HIV 5/2019 CD4 670, VL UD. Well-controlled  - abacavir 600 mG/dolutegravir 50 mG/lamiVUDine 300 mG 1 Tablet(s) Oral daily  - Follows with Jewels Guadalupe    #HTN  - metoprolol tartrate 25 milliGRAM(s) Oral two times a day    #GERD  - pantoprazole    Tablet 40 milliGRAM(s) Oral before breakfast.    dvt ppx: lovenox  ambulate as tolerated  dispo: from home  FULL CODE 49 y/o M with PMH of HIV (on treatment), DM, DLD, presents to the ED for left tooth pain for 1 week. Code STEMI called in ED for ST elevation and positive cardiac enzymes.     #STEMI V2-V3 with elevated CE  - s/p cath, which revealed three vessel disease in need of CABG. CTS is on the case and scheduling for sometime early next week.  - EKG showed ST-elevations, however unchanged from previous - making this an NSTEMI as per Dr. Griffin  - clopidogrel Tablet 75 milliGRAM(s) Oral daily  - heparin discontinued  - aspirin enteric coated 81 milliGRAM(s) Oral daily  - Check Coxsackie Abs- pending  - Echo showed: EF 40-45%, ischemic CM, and wall motion abnormalities  - CTS recommended carotid duplex - F/U  - Incentive spirometry 10/hour  - Activity as tolerated    #Toothache with L-neck swelling. Likely odontogenic  - History of Fevers. CT neck wnl, focal air lower molar. S/P Procedure. [place] teeth need removal.  - BCX: NGTD  - Continue ampicillin/sulbactam  IVPB 3 Gram(s) IV Intermittent every 6 hours  - ENT consult as worsened L neck swelling - recommending repeat of CT neck and soft tissues - findings consistent with cellulitis. No abscess.  - Warm compress to site  - Appreciate OMFS consult: scheduled for wisdom tooth extraction left upper/lower today  - HIV positive    #HLD  - Atorvastatin 40mg    #Asymptomatic HIV 5/2019 CD4 670, VL UD. Well-controlled  - abacavir 600 mG/dolutegravir 50 mG/lamiVUDine 300 mG 1 Tablet(s) Oral daily  - Follows with Jewels Guadalupe    #HTN  - metoprolol tartrate 25 milliGRAM(s) Oral two times a day    #GERD  - pantoprazole    Tablet 40 milliGRAM(s) Oral before breakfast.    dvt ppx: lovenox  ambulate as tolerated  dispo: from home  FULL CODE

## 2019-09-06 NOTE — PROGRESS NOTE ADULT - ASSESSMENT
1] ACS with atypical anginal symptom (Jaw Pain for at least 1 week prior to ER presentation) - NSTEMI (Not STEMI)      Severe Coronary Artery Disease on cardiac cath  - Significance of findings on cardiac catheterization discussed at length with the patient.  Patient was well compensated cardiacwise until he started having jaw pain/tooth ache at least 7 days prior to presentation at [facility] ER.  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  CTS input appreciated.    2] Hyperlipidemia  - Continue medications    3] Type II DM  - Glycemic control    4] HIV  - stable    Plan discussed with patient at bedside.    Remington Griffin MD  [phone] Office

## 2019-09-06 NOTE — PROGRESS NOTE ADULT - SUBJECTIVE AND OBJECTIVE BOX
HPI  Patient is a 48y old Male who presents with a chief complaint of Fevers and left-sided tooth/facial pain (05 Sep 2019 16:39)    Currently admitted to medicine with the primary diagnosis of Fever     Today is hospital day 5d.     INTERVAL HPI / OVERNIGHT EVENTS:  Patient was examined and seen at bedside. This morning he is resting comfortably in bed and reports no new issues or overnight events. The patient is s/p cardiac catheterization yesterday with three vessel disease in need of a cabg. The patient reports much improvement in his left facial swelling this morning, understands that he is scheduled for wisdom tooth removal and surgery today. the patient denied chest pain, sob, abdominal pain, or changes in urinary or BM habits.     ROS: Otherwise unremarkable     PAST MEDICAL & SURGICAL HISTORY  Diabetes  Hypercholesteremia  HIV (human immunodeficiency virus infection)  History of appendectomy    ALLERGIES  No Known Allergies    MEDICATIONS  STANDING MEDICATIONS  abacavir 600 mG/dolutegravir 50 mG/lamiVUDine 300 mG 1 Tablet(s) Oral daily  ampicillin/sulbactam  IVPB 3 Gram(s) IV Intermittent every 6 hours  aspirin enteric coated 81 milliGRAM(s) Oral daily  atorvastatin 40 milliGRAM(s) Oral at bedtime  chlorhexidine 4% Liquid 1 Application(s) Topical <User Schedule>  metoprolol tartrate 25 milliGRAM(s) Oral two times a day  pantoprazole    Tablet 40 milliGRAM(s) Oral before breakfast  sodium chloride 0.9%. 1000 milliLiter(s) IV Continuous <Continuous>    PRN MEDICATIONS  acetaminophen   Tablet .. 650 milliGRAM(s) Oral every 6 hours PRN  acetaminophen   Tablet .. 650 milliGRAM(s) Oral every 6 hours PRN  traMADol 50 milliGRAM(s) Oral three times a day PRN    VITALS:  T(F): 98.2  HR: 68  BP: 125/66  RR: 18  SpO2: --    PHYSICAL EXAM  GEN: NAD, Resting comfortably in bed  PULM: Clear to auscultation bilaterally, No wheezes  CVS: Regular rate and rhythm, S1-S2, no murmurs  ABD: Soft, non-tender, non-distended, no guarding  EXT: No edema  HEENT: Swelling at the level of the angle of the mandible on the left. With tenderness to palpation  NEURO: AAOx3, no focal deficits    LABS                        11.9   5.52  )-----------( 129      ( 06 Sep 2019 06:02 )             34.9     09-06    138  |  102  |  11  ----------------------------<  128<H>  4.0   |  21  |  0.8    Ca    9.0      06 Sep 2019 06:02      PT/INR - ( 04 Sep 2019 20:00 )   PT: 13.00 sec;   INR: 1.13 ratio               Troponin T, Serum: 0.21 ng/mL <HH> (09-06-19 @ 06:02)      CARDIAC MARKERS ( 06 Sep 2019 06:02 )  x     / 0.21 ng/mL / x     / x     / x          RADIOLOGY HPI  Patient is a 48y old Male who presents with a chief complaint of Fevers and left-sided tooth/facial pain (05 Sep 2019 16:39)    Currently admitted to medicine with the primary diagnosis of Fever     Today is hospital day 5d.     INTERVAL HPI / OVERNIGHT EVENTS:  Patient was examined and seen at bedside. This morning he is resting comfortably in bed and reports no new issues or overnight events. The patient is s/p cardiac catheterization yesterday with three vessel disease in need of a cabg. The patient reports much improvement in his left facial swelling this morning, understands that he is scheduled for wisdom tooth removal and surgery today. the patient denied chest pain, sob, abdominal pain, or changes in urinary or BM habits.     ROS: Otherwise unremarkable     PAST MEDICAL & SURGICAL HISTORY  Diabetes  Hypercholesteremia  HIV (human immunodeficiency virus infection)  History of appendectomy    ALLERGIES  No Known Allergies    MEDICATIONS  STANDING MEDICATIONS  abacavir 600 mG/dolutegravir 50 mG/lamiVUDine 300 mG 1 Tablet(s) Oral daily  ampicillin/sulbactam  IVPB 3 Gram(s) IV Intermittent every 6 hours  aspirin enteric coated 81 milliGRAM(s) Oral daily  atorvastatin 40 milliGRAM(s) Oral at bedtime  chlorhexidine 4% Liquid 1 Application(s) Topical <User Schedule>  metoprolol tartrate 25 milliGRAM(s) Oral two times a day  pantoprazole    Tablet 40 milliGRAM(s) Oral before breakfast  sodium chloride 0.9%. 1000 milliLiter(s) IV Continuous <Continuous>    PRN MEDICATIONS  acetaminophen   Tablet .. 650 milliGRAM(s) Oral every 6 hours PRN  acetaminophen   Tablet .. 650 milliGRAM(s) Oral every 6 hours PRN  traMADol 50 milliGRAM(s) Oral three times a day PRN    VITALS:  T(F): 98.2  HR: 68  BP: 125/66  RR: 18  SpO2: --    PHYSICAL EXAM  GEN: NAD, Resting comfortably in bed  PULM: Clear to auscultation bilaterally, No wheezes  CVS: Regular rate and rhythm, S1-S2, no murmurs  ABD: Soft, non-tender, non-distended, no guarding  EXT: No edema  HEENT: Swelling at the level of the angle of the mandible on the left. With tenderness to palpation of the left lower angle and mastoid attachment of SCM muscle - improving  NEURO: AAOx3, no focal deficits    LABS                        11.9   5.52  )-----------( 129      ( 06 Sep 2019 06:02 )             34.9     09-06    138  |  102  |  11  ----------------------------<  128<H>  4.0   |  21  |  0.8    Ca    9.0      06 Sep 2019 06:02      PT/INR - ( 04 Sep 2019 20:00 )   PT: 13.00 sec;   INR: 1.13 ratio               Troponin T, Serum: 0.21 ng/mL <HH> (09-06-19 @ 06:02)      CARDIAC MARKERS ( 06 Sep 2019 06:02 )  x     / 0.21 ng/mL / x     / x     / x          RADIOLOGY    < from: CT Chest No Cont (09.05.19 @ 13:54) >  MPRESSION:    Severe coronary artery calcifications (for example: series 5/147)    Great vessels normal in caliber.  Minimal calcification proximal aorta (6/22).  Mild calcification aortic arch (6/28)  Noncalcified ascending aorta.    Borderline dilatation, lower lobe and upper lobe bronchi.     No honeycombing, consolidation or effusions.    < end of copied text >    < from: CT Neck Soft Tissue w/ IV Cont (09.04.19 @ 13:17) >    IMPRESSION:    Mild submandibular infiltrative changes greater on the left which can be   seen in the setting of cellulitis. No evidence of abscess.    < end of copied text >    < from: Xray Chest 2 Views PA/Lat (09.01.19 @ 19:12) >  Impression:      No radiographic evidence of acute cardiopulmonary disease.    < end of copied text >

## 2019-09-06 NOTE — PROGRESS NOTE ADULT - ASSESSMENT
IMPRESSION  49 yo M with HIV on ART, DM, DLD, recent cavity filling 8/26, presents to the ED for worsened left lower molar pain for 1 week, found to have ST elevations V2-3 and elevated CE, fever    ASSESSMENT/RECOMMENDATION  #Multivessel disease, pending CABG. ST elevations V1-3 (seen on previous EKG at outpatient Cards) and elevated CE, ischemic vs focal myocarditis. CE now downtrending. Pt denies any chest pain. Unclear if tooth pain is a manifestation of cardiac issues as occurred one week prior and pt was diagnosed with a true tooth pathology s/p cavity filling 8/26. Recent EKG/Stress test wnl.   - Cardiology following Dr. Griffin  - Trend EKG and [place]  - OFF clopidogrel Tablet 75 milliGRAM(s) Oral daily  - aspirin enteric coated 81 milliGRAM(s) Oral daily  - TTE with decreased LVEF compared to previous, ischemic CM    #Tooth ache with L-neck swelling. Fevers. CT neck wnl, focal air lower molar likely 2/2 procedure.   Repeat 9/4 CT neck Mild submandibular infiltrative changes greater on the left which can be seen in the setting of cellulitis. No evidence of abscess.  - Dental consult, s/p extractions  - All BCX NGTD  - Continue ampicillin/sulbactam  IVPB 3 Gram(s) IV Intermittent every 6 hours (9/2-, end 9/10, 5 days post extraction pending clinical course)  - ENT consult appreciated  - Appreciate OMFS consult    #Thrombocytopenia  - were within NL on previous testing in [place]   - Peripheral smear    #HLD  - Discuss need for high dose statin with Cards, on atorvastatin 40 milliGRAM(s) Oral at bedtime    #Asymptomatic HIV 5/2019 CD4 670, VL UD. Well-controlled  - CHANGE abacavir 600 mG/dolutegravir 50 mG/lamiVUDine 300 mG 1 Tablet(s) as some studies showing Abacavir increasing lipids/CVD risk:  - With Breakfast, Rilpivirine 25mg daily and Descovy (on D/C change to combo Odefsey)  - Follows with Jewels Guadalupe    #HTN  - metoprolol tartrate 25 milliGRAM(s) Oral two times a day  - no ace/arb per Cards    #GERD  - pantoprazole    Tablet 40 milliGRAM(s) Oral before breakfast    Spectra [id number]

## 2019-09-06 NOTE — CONSULT NOTE ADULT - SUBJECTIVE AND OBJECTIVE BOX
Dental/OMFS was reconsulted for persistent odontogenic pain in left jaw on this admission, now in pre-op phase for CABG next week by CT surgery. Spoken with primary team medicine, CT surgery, and ENT, all in agreement for dental extraction to occur prior to CABG procedure next week. Since then, patient has developed left submandibular swelling, repeat CT neck was performed, non-revealing, no abscess, likely resolving sialoadenitis based on clinical picture. Febrile episodes resolved. Pt is able to tolerate more PO now. Patient is currently admitted for NSTEMI with atypical jaw pain, work-up/cardiac cath revealed triple vessel disease, CT surgery consulted and is a candidate for CABG. Now pt is subjectively feeling better, can open jaw wider, described swelling has decreased but still complaints of persistent pain pointing to tooth #17 lower left wisdom tooth. Denies fevers/chills, n/v, sob/cp, difficulty breathing.     PE:  vitals reviewed. Non-tachycardic, normotensive. afebrile  in no acute distress  breathing RA unlabored  small area approximately 1 cm of fullness to left submandibular gland, non-erythematous, no drainage, no tenderness to palpation but no induration or fluctuance. neck soft b/l. soft tissue lipomatosis b/l neck/chin.    intraorally, trismus resolved, ALESSIO > 35 mm. Tooth #17 left lower mandibular wisdom tooth tender to percussion/palpation. Teeth 16,17 with deep periodontal pockets. No purulence or drainage. Rest of dentition asymptomatic, no gross caries. Gingiva and soft tissue and buccal vestibule no ttp, non-edematous. No i/o swelling. Floor of mouth is soft, unraised. Tongue has full range of motion. Mihir-pharyngeal space clear.     Labs:  no leukocytosis  INR 1.13  Troponin downtrend, 0.21    Imaging:    CT Neck (9/4) Mild submandibular infiltrative changes greater on the left which can be   seen in the setting of cellulitis. No evidence of abscess.    Panoramic xray taken in dental clinic reveals distal carious tooth #16, upper left wisdom tooth. No kalin-apical pathology  No other gross jaw pathology noted    Procedure:  Risks, benefits, indications, alternatives discussed with patient for extraction of teeth #16, and #17. Pt understands  Informed consent signed by patient for extraction of teeth #16, and #17 under local anesthesia. Side site confirmed by attending  Lovenox was held today in anticipation for extraction.   Injected 1 carpule 2% lidocaine w/ 1:100k epi ARIANNA block, 1 carpule 4% septocaine w/ 1:200k epi and 1 carpule 2% mepivicaine w/o epi locally.   Mihir-pharyngeal pack placed. Extraction of teeth #16, 17 performed using standard elevator and forcep technique. Irrigated copiously with NS  Gelfoam placed in both sockets, secured with chromic gut 3-0 x 2. Hemostasis achieved, POIG+, Gauze pressure pack placed.   No complications, EBL 2 cc.   Pt tolerated procedure well and is in stable condition.

## 2019-09-06 NOTE — PROGRESS NOTE ADULT - SUBJECTIVE AND OBJECTIVE BOX
BRISEIDA SIMON  48y, Male  Allergy: No Known Allergies      CHIEF COMPLAINT: Fevers and left-sided tooth/facial pain (06 Sep 2019 11:54)      INTERVAL EVENTS/HPI  - No acute events overnight s/p dental extractions  - T(F): , Max: 98.2 (09-05-19 @ 21:09)  - Denies any worsening symptoms  - Tolerating medication  - WBC Count: 5.52 K/uL (09-06-19 @ 06:02)      ROS  General: Denies rigors, nightsweats  HEENT: Denies headache, rhinorrhea, sore throat, eye pain  CV: Denies CP, palpitations  PULM: Denies SOB, wheezing  GI: Denies hematemesis, hematochezia, melena  : Denies discharge, hematuria  MSK: Denies arthralgias, myalgias  SKIN: Denies rash, lesions  NEURO: Denies paresthesias, weakness  PSYCH: Denies depression, anxiety    VITALS:  T(F): 97.8, Max: 98.2 (09-05-19 @ 21:09)  HR: 61  BP: 122/67  RR: 18Vital Signs Last 24 Hrs  T(C): 36.6 (06 Sep 2019 13:34), Max: 36.8 (05 Sep 2019 21:09)  T(F): 97.8 (06 Sep 2019 13:34), Max: 98.2 (05 Sep 2019 21:09)  HR: 61 (06 Sep 2019 13:34) (61 - 68)  BP: 122/67 (06 Sep 2019 13:34) (122/67 - 125/66)  BP(mean): --  RR: 18 (06 Sep 2019 13:34) (18 - 18)  SpO2: --    PHYSICAL EXAM:  Gen: NAD, resting in bed  HEENT: Normocephalic, atraumatic, L lower molar erythema  Neck: supple, no lymphadenopathy,  decreased L neck induration in submandibular area, TTP, trismus  CV: Regular rate & regular rhythm  Lungs: CTAB  Abdomen: Soft, BS present  Ext: Warm, well perfused  Neuro: non focal, awake  Skin: no rash, no erythema  Lines: no phlebitis      FH: Non-contributory  Social Hx: Non-contributory    TESTS & MEASUREMENTS:                        11.9   5.52  )-----------( 129      ( 06 Sep 2019 06:02 )             34.9     09-06    138  |  102  |  11  ----------------------------<  128<H>  4.0   |  21  |  0.8    Ca    9.0      06 Sep 2019 06:02      eGFR if Non : 106 mL/min/1.73M2 (09-06-19 @ 06:02)  eGFR if African American: 123 mL/min/1.73M2 (09-06-19 @ 06:02)          Culture - Urine (collected 09-03-19 @ 00:00)  Source: .Urine Clean Catch (Midstream)  Final Report (09-04-19 @ 10:59):    No growth    Culture - Blood (collected 09-02-19 @ 05:44)  Source: .Blood None  Preliminary Report (09-03-19 @ 11:13):    No growth to date.    Culture - Blood (collected 09-01-19 @ 18:50)  Source: .Blood Blood  Preliminary Report (09-02-19 @ 23:01):    No growth to date.    Culture - Blood (collected 09-01-19 @ 18:50)  Source: .Blood Blood  Preliminary Report (09-02-19 @ 23:01):    No growth to date.        Lactate, Blood: 1.7 mmol/L (09-01-19 @ 18:50)      INFECTIOUS DISEASES TESTING  HIV-1/2 Combo Result: Reactive (09-02-19 @ 05:44)      RADIOLOGY & ADDITIONAL TESTS:  I have personally reviewed the last Chest xray  CXR      CT  CT Chest No Cont:   EXAM:  CT CHEST            PROCEDURE DATE:  09/05/2019            INTERPRETATION:  Reason for Exam:  Preop CABG  CT of the chest was performed from the thoracic inlet to the level of the   adrenal glands without contrast injection. Coronal and sagittal images   have been submitted.    Comparison: No prior thoracic CT scans.    CT neck-9/4/2019.    Findings:     Tubes/Lines: None    Lungs, Pleura, and Airways: No endobronchial obstruction, mass or pleural   effusions are noted. There is no evidence of honeycombing or groundglass   opacities. Borderline dilatation, lower lobe and upper lobe bronchi are   noted (5/111, 192).    Pulmonary nodules: Multiple 1-2 mm lower lobe calcified granuloma (for   example 5/196, 184).  No suspicious pulmonary nodules.    Mediastinum/Lymph Nodes:No adenopathy or mass. Scattered subcentimeter   para-aortic, paratracheal lymph nodes.    Heart/Great Vessels: No pericardial effusions. Severe coronary artery   calcifications (5/147)  Great vessels normal in caliber.  Minimal calcification proximal aorta (6/22). Mild calcification aortic   arch (6/28)  Noncalcified ascending aorta.    Abdomen: Contrast material within nondistended renal collecting system   (from prior postcontrast CT scan of the neck). Multiple punctate   calcifications pancreatic head and adjacent soft tissues (5/314, 319)    Bones and soft tissues: No suspicious osseous lesions    IMPRESSION:    Severe coronary artery calcifications (for example: series 5/147)    Great vessels normal in caliber.  Minimal calcification proximal aorta (6/22).  Mild calcification aortic arch (6/28)  Noncalcified ascending aorta.    Borderline dilatation, lower lobe and upper lobe bronchi.     No honeycombing, consolidation or effusions.                  LJ SILVA M.D., ATTENDING RADIOLOGIST  This document has been electronically signed. Sep  6 2019  8:42AM             (09-05-19 @ 13:54)      CARDIOLOGY TESTING  12 Lead ECG:   Ventricular Rate 69 BPM    Atrial Rate 69 BPM    P-R Interval 180 ms    QRS Duration 108 ms    Q-T Interval 408 ms    QTC Calculation(Bezet) 437 ms    P Axis 32 degrees    R Axis 66 degrees    T Axis 21 degrees    Diagnosis Line Sinus rhythm with occasional Premature ventricular complexes  Anteroseptal infarct , age undetermined  Abnormal ECG    Confirmed by Chin Liao ([id number]) on 9/4/2019 8:36:13 AM (09-04-19 @ 07:40)  12 Lead ECG:   Ventricular Rate 83 BPM    Atrial Rate 83 BPM    P-R Interval 164 ms    QRS Duration 112 ms    Q-T Interval 366 ms    QTC Calculation(Bezet) 430 ms    P Axis 34 degrees    R Axis 96 degrees    T Axis -11 degrees    Diagnosis Line Normal sinus rhythm  Possible Left atrial enlargement  Rightward axis  Anteroseptal infarct , age undetermined  Abnormal ECG    Confirmed by MATT WAYNE MD ([id number]) on 9/3/2019 8:04:02 AM (09-03-19 @ 07:33)      MEDICATIONS  abacavir 600 mG/dolutegravir 50 mG/lamiVUDine 300 mG 1  ampicillin/sulbactam  IVPB 3  aspirin enteric coated 81  atorvastatin 40  chlorhexidine 4% Liquid 1  metoprolol tartrate 25  pantoprazole    Tablet 40  sodium chloride 0.9%. 1000      ANTIBIOTICS:  abacavir 600 mG/dolutegravir 50 mG/lamiVUDine 300 mG 1 Tablet(s) Oral daily  ampicillin/sulbactam  IVPB 3 Gram(s) IV Intermittent every 6 hours      All available historical records have been reviewed

## 2019-09-06 NOTE — PROGRESS NOTE ADULT - SUBJECTIVE AND OBJECTIVE BOX
PLANNED OPERATIVE PROCEDURE(s):  pre-op CABG              HD #    5                   SURGEON(s): VIDHI Roldan  SUBJECTIVE ASSESSMENT: 48y Male patient seen and examined at bedside. Patient continues to complaining of left sided jaw pain associated with dental abscess. Patient to go down to dental clinic today for extraction and clearance      Vital Signs Last 24 Hrs  T(F): 98.2 (05 Sep 2019 21:09), Max: 98.2 (05 Sep 2019 21:09)  HR: 68 (05 Sep 2019 21:09) (61 - 69)  BP: 125/66 (05 Sep 2019 21:09) (107/67 - 125/66)  RR: 18 (05 Sep 2019 21:09) (18 - 18)    I&O's Detail    05 Sep 2019 07:01  -  06 Sep 2019 07:00  --------------------------------------------------------  IN:    Oral Fluid: 360 mL  Total IN: 360 mL    OUT:  Total OUT: 0 mL    Net: I&O's Detail    04 Sep 2019 07:01  -  05 Sep 2019 07:00  --------------------------------------------------------  Total NET: 419 mL    05 Sep 2019 07:01  -  06 Sep 2019 07:00  --------------------------------------------------------  Total NET: 360 mL      CAPILLARY BLOOD GLUCOSE  POCT Blood Glucose.: 165 mg/dL (06 Sep 2019 07:58)  POCT Blood Glucose.: 171 mg/dL (05 Sep 2019 22:51)  POCT Blood Glucose.: 133 mg/dL (05 Sep 2019 11:14)      Physical Exam:  General: NAD; A&Ox3  Cardiac: S1/S2, RRR, no murmur, no rubs  Lungs: unlabored respirations, CTA b/l, no wheeze, no rales, no crackles  Abdomen: Soft/NT/ND; positive bowel sounds x 4  Extremities: No edema b/l lower extremities; good capillary refill; no cyanosis; palpable 1+ pedal pulses b/l        LABS:                        11.9<L>  5.52  )-----------( 129<L>    ( 06 Sep 2019 06:02 )             34.9<L>                        12.1<L>  4.32<L> )-----------( 100<L>    ( 04 Sep 2019 06:00 )             35.5<L>    09-06    138  |  102  |  11  ----------------------------<  128<H>  4.0   |  21  |  0.8  09-04    138  |  101  |  9<L>  ----------------------------<  141<H>  3.7   |  21  |  0.7    Ca    9.0      06 Sep 2019 06:02  Phos  3.7     09-04  Mg     2.0     09-04    TPro  6.3 [6.0 - 8.0]  /  Alb  3.7 [3.5 - 5.2]  /  TBili  0.7 [0.2 - 1.2]  /  DBili  x   /  AST  28 [0 - 41]  /  ALT  37 [0 - 41]  /  AlkPhos  42 [30 - 115]  09-04    PT/INR - ( 04 Sep 2019 20:00 )   PT: ;   INR: 1.13 ratio      Urine Microscopic-Add On (NC) (09.03.19 @ 00:00)    Red Blood Cell - Urine: 6 /HPF    White Blood Cell - Urine: 1 /HPF    Hyaline Casts: 0 /LPF    Bacteria: Negative    Epithelial Cells: 0 /HPF  Urinalysis (09.03.19 @ 00:00)    Glucose Qualitative, Urine: Negative    Blood, Urine: Negative    pH Urine: 7.0    Color: Yellow    Urine Appearance: Clear    Bilirubin: Negative    Ketone - Urine: Negative    Specific Gravity: 1.016    Protein, Urine: 30 mg/dL    Urobilinogen: 3 mg/dL    Nitrite: Negative    Leukocyte Esterase Concentration: Negative    Serum Pro-Brain Natriuretic Peptide (09.06.19 @ 06:02)    Serum Pro-Brain Natriuretic Peptide: 244 pg/mL      RADIOLOGY & ADDITIONAL TESTS:    Cardiac cath: 3-vessel coronary artery disease (LAD, RCA, and circumflex).   Serum Pro-Brain Natriuretic Peptide (09.06.19 @ 06:02)    Serum Pro-Brain Natriuretic Peptide: 244 pg/mL    Left main: moderate atherosclerosis, no clinical lesion appreciated.  LAD: Ostial: 50 % stenosis. Proximal: 99 % stenosis chronic total occlusion. Mid: 100 % stenosis.  2nd diagonal: 60 % stenosis in the proximal third of the vessel segment. In a second lesion, there was a tubular 50 % stenosis. Circumflex: Distal 60 % stenosis at the origin of OM2.   1st obtuse marginal: severe atherosclerosis. 2nd obtuse marginal: 90 % stenosis   RCA: proximal 100 % stenosis chronic total occlusion.     Carotid duplex: Mild occlusive disease Pending report    TTE: < from: Transthoracic Echocardiogram (09.02.19 @ 13:49) >  Summary:   1. Left ventricular ejection fraction, by visual estimation, is 40 to 45%.   2. Mildly to moderately decreased global left ventricular systolic function.   3. Multiple left ventricular regional wall motion abnormalities exist. See wall motion findings.   4. Ischemic cardiomyopathy.   5. LA volume Index is 16.0 ml/m² ml/m2.    PHYSICIAN INTERPRETATION:  Left Ventricle: The left ventricular internal cavity size is normal. Left ventricular wall thickness is normal. Global LV systolic function was mildly to moderately decreased. Left ventricular ejection fraction, by visual estimation, is 40 to 45%. Findings are consistent with ischemic cardiomyopathy.  < end of copied text >     CT Chest: < from: CT Chest No Cont (09.05.19 @ 13:54) >  IMPRESSION:  Severe coronary artery calcifications (for example: series 5/147)  Great vessels normal in caliber.  Minimal calcification proximal aorta (6/22).  Mild calcification aortic arch (6/28)  Noncalcified ascending aorta.  Borderline dilatation, lower lobe and upper lobe bronchi.  No honeycombing, consolidation or effusions.  < end of copied text >     PFTS:        Allergies  No Known Allergies  Intolerances      MEDICATIONS  (STANDING):  abacavir 600 mG/dolutegravir 50 mG/lamiVUDine 300 mG 1 Tablet(s) Oral daily  ampicillin/sulbactam  IVPB 3 Gram(s) IV Intermittent every 6 hours  aspirin enteric coated 81 milliGRAM(s) Oral daily  atorvastatin 40 milliGRAM(s) Oral at bedtime  chlorhexidine 4% Liquid 1 Application(s) Topical <User Schedule>  metoprolol tartrate 25 milliGRAM(s) Oral two times a day  pantoprazole    Tablet 40 milliGRAM(s) Oral before breakfast  sodium chloride 0.9%. 1000 milliLiter(s) (75 mL/Hr) IV Continuous <Continuous>    MEDICATIONS  (PRN):  acetaminophen   Tablet .. 650 milliGRAM(s) Oral every 6 hours PRN Temp greater or equal to 38C (100.4F)  acetaminophen   Tablet .. 650 milliGRAM(s) Oral every 6 hours PRN Mild Pain (1 - 3)  traMADol 50 milliGRAM(s) Oral three times a day PRN Moderate Pain (4 - 6)      Assessment/Plan:  48y Male Pre-op for CABG  - Case and plan discussed with CTU Intensivist and CT Surgeon - Dr. Roldan   - Continue CTU supportive care and ongoing plan of care as per continuing CTU rounds.    - Continue DVT/GI prophylaxis  - Incentive Spirometry 10 times an hour  - Continue to advance physical activity as tolerated and continue PT/OT as directed  1. CAD: Continue ASA, statin, BB  2. Continue to follow pre-operative tests/studies. Pending MRSA nasal swab, Hemoglobin A1c.

## 2019-09-07 LAB
ANION GAP SERPL CALC-SCNC: 13 MMOL/L — SIGNIFICANT CHANGE UP (ref 7–14)
BUN SERPL-MCNC: 10 MG/DL — SIGNIFICANT CHANGE UP (ref 10–20)
CALCIUM SERPL-MCNC: 9.1 MG/DL — SIGNIFICANT CHANGE UP (ref 8.5–10.1)
CHLORIDE SERPL-SCNC: 101 MMOL/L — SIGNIFICANT CHANGE UP (ref 98–110)
CO2 SERPL-SCNC: 25 MMOL/L — SIGNIFICANT CHANGE UP (ref 17–32)
CREAT SERPL-MCNC: 1 MG/DL — SIGNIFICANT CHANGE UP (ref 0.7–1.5)
CULTURE RESULTS: SIGNIFICANT CHANGE UP
GLUCOSE BLDC GLUCOMTR-MCNC: 129 MG/DL — HIGH (ref 70–99)
GLUCOSE BLDC GLUCOMTR-MCNC: 130 MG/DL — HIGH (ref 70–99)
GLUCOSE BLDC GLUCOMTR-MCNC: 137 MG/DL — HIGH (ref 70–99)
GLUCOSE BLDC GLUCOMTR-MCNC: 142 MG/DL — HIGH (ref 70–99)
GLUCOSE SERPL-MCNC: 155 MG/DL — HIGH (ref 70–99)
HCT VFR BLD CALC: 39.8 % — LOW (ref 42–52)
HGB BLD-MCNC: 13.8 G/DL — LOW (ref 14–18)
MCHC RBC-ENTMCNC: 29.6 PG — SIGNIFICANT CHANGE UP (ref 27–31)
MCHC RBC-ENTMCNC: 34.7 G/DL — SIGNIFICANT CHANGE UP (ref 32–37)
MCV RBC AUTO: 85.4 FL — SIGNIFICANT CHANGE UP (ref 80–94)
NRBC # BLD: 0 /100 WBCS — SIGNIFICANT CHANGE UP (ref 0–0)
PLATELET # BLD AUTO: 149 K/UL — SIGNIFICANT CHANGE UP (ref 130–400)
POTASSIUM SERPL-MCNC: 3.9 MMOL/L — SIGNIFICANT CHANGE UP (ref 3.5–5)
POTASSIUM SERPL-SCNC: 3.9 MMOL/L — SIGNIFICANT CHANGE UP (ref 3.5–5)
RBC # BLD: 4.66 M/UL — LOW (ref 4.7–6.1)
RBC # FLD: 12.5 % — SIGNIFICANT CHANGE UP (ref 11.5–14.5)
SODIUM SERPL-SCNC: 139 MMOL/L — SIGNIFICANT CHANGE UP (ref 135–146)
SPECIMEN SOURCE: SIGNIFICANT CHANGE UP
WBC # BLD: 6.84 K/UL — SIGNIFICANT CHANGE UP (ref 4.8–10.8)
WBC # FLD AUTO: 6.84 K/UL — SIGNIFICANT CHANGE UP (ref 4.8–10.8)

## 2019-09-07 PROCEDURE — 93010 ELECTROCARDIOGRAM REPORT: CPT | Mod: 77

## 2019-09-07 PROCEDURE — 93010 ELECTROCARDIOGRAM REPORT: CPT

## 2019-09-07 PROCEDURE — 99231 SBSQ HOSP IP/OBS SF/LOW 25: CPT

## 2019-09-07 RX ORDER — IPRATROPIUM/ALBUTEROL SULFATE 18-103MCG
3 AEROSOL WITH ADAPTER (GRAM) INHALATION EVERY 6 HOURS
Refills: 0 | Status: DISCONTINUED | OUTPATIENT
Start: 2019-09-07 | End: 2019-09-10

## 2019-09-07 RX ORDER — DOCUSATE SODIUM 100 MG
100 CAPSULE ORAL
Refills: 0 | Status: DISCONTINUED | OUTPATIENT
Start: 2019-09-07 | End: 2019-09-10

## 2019-09-07 RX ADMIN — HEPARIN SODIUM 5000 UNIT(S): 5000 INJECTION INTRAVENOUS; SUBCUTANEOUS at 06:28

## 2019-09-07 RX ADMIN — AMPICILLIN SODIUM AND SULBACTAM SODIUM 200 GRAM(S): 250; 125 INJECTION, POWDER, FOR SUSPENSION INTRAMUSCULAR; INTRAVENOUS at 06:28

## 2019-09-07 RX ADMIN — AMPICILLIN SODIUM AND SULBACTAM SODIUM 200 GRAM(S): 250; 125 INJECTION, POWDER, FOR SUSPENSION INTRAMUSCULAR; INTRAVENOUS at 12:12

## 2019-09-07 RX ADMIN — Medication 100 MILLIGRAM(S): at 17:14

## 2019-09-07 RX ADMIN — PANTOPRAZOLE SODIUM 40 MILLIGRAM(S): 20 TABLET, DELAYED RELEASE ORAL at 06:28

## 2019-09-07 RX ADMIN — Medication 81 MILLIGRAM(S): at 12:12

## 2019-09-07 RX ADMIN — SODIUM CHLORIDE 75 MILLILITER(S): 9 INJECTION INTRAMUSCULAR; INTRAVENOUS; SUBCUTANEOUS at 04:10

## 2019-09-07 RX ADMIN — CHLORHEXIDINE GLUCONATE 1 APPLICATION(S): 213 SOLUTION TOPICAL at 06:25

## 2019-09-07 RX ADMIN — ATORVASTATIN CALCIUM 40 MILLIGRAM(S): 80 TABLET, FILM COATED ORAL at 22:49

## 2019-09-07 RX ADMIN — AMPICILLIN SODIUM AND SULBACTAM SODIUM 200 GRAM(S): 250; 125 INJECTION, POWDER, FOR SUSPENSION INTRAMUSCULAR; INTRAVENOUS at 23:28

## 2019-09-07 RX ADMIN — Medication 25 MILLIGRAM(S): at 06:27

## 2019-09-07 RX ADMIN — Medication 25 MILLIGRAM(S): at 17:14

## 2019-09-07 RX ADMIN — HEPARIN SODIUM 5000 UNIT(S): 5000 INJECTION INTRAVENOUS; SUBCUTANEOUS at 22:49

## 2019-09-07 RX ADMIN — Medication 600 MILLIGRAM(S): at 17:14

## 2019-09-07 RX ADMIN — HEPARIN SODIUM 5000 UNIT(S): 5000 INJECTION INTRAVENOUS; SUBCUTANEOUS at 15:40

## 2019-09-07 RX ADMIN — AMPICILLIN SODIUM AND SULBACTAM SODIUM 200 GRAM(S): 250; 125 INJECTION, POWDER, FOR SUSPENSION INTRAMUSCULAR; INTRAVENOUS at 00:21

## 2019-09-07 RX ADMIN — AMPICILLIN SODIUM AND SULBACTAM SODIUM 200 GRAM(S): 250; 125 INJECTION, POWDER, FOR SUSPENSION INTRAMUSCULAR; INTRAVENOUS at 17:14

## 2019-09-07 NOTE — PROGRESS NOTE ADULT - ASSESSMENT
49 y/o M with PMH of HIV (on treatment), DM, DLD, presents to the ED for left tooth pain for 1 week. Code STEMI called in ED for ST elevation and positive cardiac enzymes.     #STEMI V2-V3 with elevated CE  - s/p cath, which revealed three vessel disease in need of CABG. CTS is on the case and scheduling for sometime early next week.  - EKG showed ST-elevations, however unchanged from previous - making this an NSTEMI as per Dr. Griffin  - clopidogrel discontinued for CABG next week  - heparin discontinued  - aspirin enteric coated 81 milliGRAM(s) Oral daily  - Check Coxsackie Abs- pending  - Echo showed: EF 40-45%, ischemic CM, and wall motion abnormalities  - CTS recommended carotid duplex - preliminary report showed 20-39% stenosis of the right internal carotid artery. 40-59 % stenosis of the left internal carotid artery.  - Incentive spirometry 10/hour  - Activity as tolerated    #Toothache with L-neck swelling. Likely odontogenic  - History of Fevers. CT neck wnl, focal air lower molar. S/P Procedure. [place] teeth need removal.  - BCX: NGTD  - Continue ampicillin/sulbactam  IVPB 3 Gram(s) IV Intermittent every 6 hours  - ENT consult as worsened L neck swelling - recommending repeat of CT neck and soft tissues - findings consistent with cellulitis. No abscess.  - Warm compress to site  - s/p wisdom tooth extraction  - HIV positive - changing regimen to Descovy and rilpivirine (as per ID recommendation) when drug becomes available. Non-formulary submitted to pharmacy yesterday.    #HLD  - Atorvastatin 40mg    #Asymptomatic HIV 5/2019 CD4 670, VL UD. Well-controlled  - abacavir 600 mG/dolutegravir 50 mG/lamiVUDine 300 mG 1 Tablet(s) Oral daily  - Will change to Descovy and rilpivirine once non-formulary is processed by pharmacy and drug becomes available  - Follows with [facility] Guadalupe    #HTN  - metoprolol tartrate 25 milliGRAM(s) Oral two times a day    #GERD  - pantoprazole    Tablet 40 milliGRAM(s) Oral before breakfast.    dvt ppx: lovenox  ambulate as tolerated  dispo: from home  FULL CODE 49 y/o M with PMH of HIV (on treatment), DM, DLD, presents to the ED for left tooth pain for 1 week. Code STEMI called in ED for ST elevation and positive cardiac enzymes.     #STEMI V2-V3 with elevated CE  - s/p cath, which revealed three vessel disease in need of CABG. CTS is on the case and scheduling for sometime early next week.  - EKG showed ST-elevations, however unchanged from previous - making this an NSTEMI as per Dr. Griffin  - clopidogrel discontinued for CABG next week  - heparin discontinued  - aspirin enteric coated 81 milliGRAM(s) Oral daily  - Check Coxsackie Abs- pending  - Echo showed: EF 40-45%, ischemic CM, and wall motion abnormalities  - CTS recommended carotid duplex - preliminary report showed 20-39% stenosis of the right internal carotid artery. 40-59 % stenosis of the left internal carotid artery.  - Incentive spirometry 10/hour  - Activity as tolerated    #Toothache with L-neck swelling. Likely odontogenic  - S/P [place] tooth removal. Swelling much improved. Restarting lovenox  - History of Fevers. CT neck wnl, focal air lower molar. S/P Procedure. [place] teeth need removal.  - BCX: NGTD  - Continue ampicillin/sulbactam  IVPB 3 Gram(s) IV Intermittent every 6 hours  - ENT consult as worsened L neck swelling - recommending repeat of CT neck and soft tissues - findings consistent with cellulitis. No abscess.  - HIV positive - changing regimen to Descovy and rilpivirine (as per ID recommendation) when drug becomes available. Non-formulary submitted to pharmacy yesterday.    #HLD  - Atorvastatin 40mg    #Asymptomatic HIV 5/2019 CD4 670, VL UD. Well-controlled  - abacavir 600 mG/dolutegravir 50 mG/lamiVUDine 300 mG 1 Tablet(s) Oral daily  - Will change to Descovy and rilpivirine once non-formulary is processed by pharmacy and drug becomes available  - Follows with [facility] Guadalupe    #HTN  - metoprolol tartrate 25 milliGRAM(s) Oral two times a day    #GERD  - pantoprazole    Tablet 40 milliGRAM(s) Oral before breakfast.    dvt ppx: lovenox  ambulate as tolerated  dispo: from home  FULL CODE 47 y/o M with PMH of HIV (on treatment), DM, DLD, presents to the ED for left tooth pain for 1 week. Code STEMI called in ED for ST elevation and positive cardiac enzymes.     #STEMI V2-V3 with elevated CE  - s/p cath, which revealed three vessel disease in need of CABG. CTS is on the case and scheduling for sometime early next week.  - EKG showed ST-elevations, however unchanged from previous - making this an NSTEMI as per Dr. Griffin  - clopidogrel discontinued for CABG next week  - heparin discontinued  - aspirin enteric coated 81 milliGRAM(s) Oral daily  - Check Coxsackie Abs- pending  - Echo showed: EF 40-45%, ischemic CM, and wall motion abnormalities  - CTS recommended carotid duplex - preliminary report showed 20-39% stenosis of the right internal carotid artery. 40-59 % stenosis of the left internal carotid artery.  - Incentive spirometry 10/hour  - Activity as tolerated    #Toothache with L-neck swelling. Likely odontogenic  - S/P [place] tooth removal. Swelling much improved. Restarting heparin  - History of Fevers. CT neck wnl, focal air lower molar. S/P Procedure. [place] teeth need removal.  - BCX: NGTD  - Continue ampicillin/sulbactam  IVPB 3 Gram(s) IV Intermittent every 6 hours  - ENT consult as worsened L neck swelling - recommending repeat of CT neck and soft tissues - findings consistent with cellulitis. No abscess.  - HIV positive - changing regimen to Descovy and rilpivirine (as per ID recommendation) when drug becomes available. Non-formulary submitted to pharmacy yesterday.    #HLD  - Atorvastatin 40mg    #Asymptomatic HIV 5/2019 CD4 670, VL UD. Well-controlled  - abacavir 600 mG/dolutegravir 50 mG/lamiVUDine 300 mG 1 Tablet(s) Oral daily  - Will change to Descovy and rilpivirine once non-formulary is processed by pharmacy and drug becomes available  - Follows with [facility] Guadalupe    #HTN  - metoprolol tartrate 25 milliGRAM(s) Oral two times a day    #GERD  - pantoprazole    Tablet 40 milliGRAM(s) Oral before breakfast.    dvt ppx: lovenox  ambulate as tolerated  dispo: from home  FULL CODE

## 2019-09-07 NOTE — PROGRESS NOTE ADULT - SUBJECTIVE AND OBJECTIVE BOX
Patient was seen and examined by me on 3C    He is ambulating in room.  No new complaints.  He denies any jaw pain/tooth ache.  He denies any chest pain/discomfort.    Vitals:  T(C): 35.2 (09-07-19 @ 14:00), Max: 36.8 (09-05-19 @ 21:09)  HR: 55 (09-07-19 @ 14:00) (55 - 74)  BP: 129/58 (09-07-19 @ 14:00) (114/73 - 158/84)  RR: 18 (09-07-19 @ 14:00) (18 - 18)  SpO2: --    Telemetry: Sinus    LABS:                        13.8   6.84  )-----------( 149      ( 07 Sep 2019 06:18 )             39.8     09-07    139  |  101  |  10  ----------------------------<  155<H>  3.9   |  25  |  1.0    Ca    9.1      07 Sep 2019 06:18        MEDICATIONS  (STANDING):  abacavir 600 mG/dolutegravir 50 mG/lamiVUDine 300 mG 1 Tablet(s) Oral daily  ALBUTerol/ipratropium for Nebulization 3 milliLiter(s) Nebulizer every 6 hours  ampicillin/sulbactam  IVPB 3 Gram(s) IV Intermittent every 6 hours  aspirin enteric coated 81 milliGRAM(s) Oral daily  atorvastatin 40 milliGRAM(s) Oral at bedtime  chlorhexidine 4% Liquid 1 Application(s) Topical <User Schedule>  docusate sodium 100 milliGRAM(s) Oral two times a day  guaiFENesin  milliGRAM(s) Oral every 12 hours  heparin  Injectable 5000 Unit(s) SubCutaneous every 8 hours  metoprolol tartrate 25 milliGRAM(s) Oral two times a day  pantoprazole    Tablet 40 milliGRAM(s) Oral before breakfast  sodium chloride 0.9%. 1000 milliLiter(s) (75 mL/Hr) IV Continuous <Continuous>    MEDICATIONS  (PRN):  acetaminophen   Tablet .. 650 milliGRAM(s) Oral every 6 hours PRN Temp greater or equal to 38C (100.4F)  acetaminophen   Tablet .. 650 milliGRAM(s) Oral every 6 hours PRN Mild Pain (1 - 3)  oxyCODONE    5 mG/acetaminophen 325 mG 2 Tablet(s) Oral every 4 hours PRN Severe Pain (7 - 10)  traMADol 50 milliGRAM(s) Oral three times a day PRN Moderate Pain (4 - 6)      PHYSICAL EXAM:  Not in distress  Alert, oriented x 3  No JVD; regular rhythm; nl S1S2; no murmurs, no rubs  Clear breath sounds bilaterally  Abdomen soft  No edema  No focal neurologic deficits

## 2019-09-07 NOTE — PROGRESS NOTE ADULT - ASSESSMENT
1] Severe 3 Vessel CAD with cardiomyopathy.  Stage B CHF.  - Awaiting CABG  - Will need Life Vest upon discharge  - Start Enalapril 2.5 mg tablet q 12 hrs.    - Continue Metoprolol as prescribed  - Aspirin 81 mg tablet daily     2] Hyperlipidemia  - Continue medications    3] Type II DM  - Glycemic control    4] HIV  - stable    Plan discussed with patient at bedside.    Remington Griffin MD  [phone] Office

## 2019-09-07 NOTE — PROGRESS NOTE ADULT - SUBJECTIVE AND OBJECTIVE BOX
HPI  Patient is a 48y old Male who presents with a chief complaint of Fevers and left-sided tooth/facial pain (06 Sep 2019 23:33)    Currently admitted to medicine with the primary diagnosis of Fever     Today is hospital day 6d.     INTERVAL HPI / OVERNIGHT EVENTS:  Patient was examined and seen at bedside. This morning he is resting comfortably in bed and reports no new issues or overnight events. The patient is s/p cardiac catheterization yesterday with three vessel disease in need of a cabg. The patient reports much improvement in his left facial swelling this morning, understands that he is scheduled for wisdom tooth removal and surgery today. the patient denied chest pain, sob, abdominal pain, or changes in urinary or BM habits  ROS: Otherwise unremarkable     PAST MEDICAL & SURGICAL HISTORY  Diabetes  Hypercholesteremia  HIV (human immunodeficiency virus infection)  History of appendectomy    ALLERGIES  No Known Allergies    MEDICATIONS  STANDING MEDICATIONS  abacavir 600 mG/dolutegravir 50 mG/lamiVUDine 300 mG 1 Tablet(s) Oral daily  ampicillin/sulbactam  IVPB 3 Gram(s) IV Intermittent every 6 hours  aspirin enteric coated 81 milliGRAM(s) Oral daily  atorvastatin 40 milliGRAM(s) Oral at bedtime  chlorhexidine 4% Liquid 1 Application(s) Topical <User Schedule>  emtricitabine 200 mG/tenofovir alafenamide 25 mG (DESCOVY) Tablet 1 Tablet(s) Oral daily  heparin  Injectable 5000 Unit(s) SubCutaneous every 8 hours  metoprolol tartrate 25 milliGRAM(s) Oral two times a day  pantoprazole    Tablet 40 milliGRAM(s) Oral before breakfast  sodium chloride 0.9%. 1000 milliLiter(s) IV Continuous <Continuous>    PRN MEDICATIONS  acetaminophen   Tablet .. 650 milliGRAM(s) Oral every 6 hours PRN  acetaminophen   Tablet .. 650 milliGRAM(s) Oral every 6 hours PRN  oxyCODONE    5 mG/acetaminophen 325 mG 2 Tablet(s) Oral every 4 hours PRN  traMADol 50 milliGRAM(s) Oral three times a day PRN    VITALS:  T(F): 97.4  HR: 73  BP: 114/73  RR: 18  SpO2: --    PHYSICAL EXAM  GEN: NAD, Resting comfortably in bed  PULM: Clear to auscultation bilaterally, No wheezes  CVS: Regular rate and rhythm, S1-S2, no murmurs  ABD: Soft, non-tender, non-distended, no guarding  EXT: No edema  HEENT: Swelling at the level of the angle of the mandible on the left. With tenderness to palpation of the left lower angle and mastoid attachment of SCM muscle - improving  NEURO: AAOx3, no focal deficits      LABS                        13.8   6.84  )-----------( 149      ( 07 Sep 2019 06:18 )             39.8     09-06    138  |  102  |  11  ----------------------------<  128<H>  4.0   |  21  |  0.8    Ca    9.0      06 Sep 2019 06:02                CARDIAC MARKERS ( 06 Sep 2019 06:02 )  x     / 0.21 ng/mL / x     / x     / x          RADIOLOGY    < from: CT Chest No Cont (09.05.19 @ 13:54) >  MPRESSION:    Severe coronary artery calcifications (for example: series 5/147)    Great vessels normal in caliber.  Minimal calcification proximal aorta (6/22).  Mild calcification aortic arch (6/28)  Noncalcified ascending aorta.    Borderline dilatation, lower lobe and upper lobe bronchi.     No honeycombing, consolidation or effusions.    < end of copied text >    < from: CT Neck Soft Tissue w/ IV Cont (09.04.19 @ 13:17) >    IMPRESSION:    Mild submandibular infiltrative changes greater on the left which can be   seen in the setting of cellulitis. No evidence of abscess.    < end of copied text >    < from: Xray Chest 2 Views PA/Lat (09.01.19 @ 19:12) >  Impression:      No radiographic evidence of acute cardiopulmonary disease.    < end of copied text >    PRELIMINARY REPORT< from: [facility] Duplex Carotid, Bilat (09.05.19 @ 16:55) >  Impression:    20-39% stenosis of the right internal carotid artery.    40-59 % stenosis of the left internal carotid artery.    ICD-10: I65.8    < end of copied text > HPI  Patient is a 48y old Male who presents with a chief complaint of Fevers and left-sided tooth/facial pain (06 Sep 2019 23:33)    Currently admitted to medicine with the primary diagnosis of Fever     Today is hospital day 6d.     INTERVAL HPI / OVERNIGHT EVENTS:  Patient was examined and seen at bedside. This morning he is resting comfortably in bed and reports no new issues or overnight events. The patient is s/p wisdom tooth removal yesterday. The patient reports much improvement in his left facial swelling this morning, understands that he is scheduled for cabg surgery next week. The patient denied chest pain, sob, abdominal pain, or changes in urinary or BM habits. Overnight, there was a concerning EKG with multiple t wave inversions, but a subsequent EKG improved.     ROS: Otherwise unremarkable     PAST MEDICAL & SURGICAL HISTORY  Diabetes  Hypercholesteremia  HIV (human immunodeficiency virus infection)  History of appendectomy    ALLERGIES  No Known Allergies    MEDICATIONS  STANDING MEDICATIONS  abacavir 600 mG/dolutegravir 50 mG/lamiVUDine 300 mG 1 Tablet(s) Oral daily  ampicillin/sulbactam  IVPB 3 Gram(s) IV Intermittent every 6 hours  aspirin enteric coated 81 milliGRAM(s) Oral daily  atorvastatin 40 milliGRAM(s) Oral at bedtime  chlorhexidine 4% Liquid 1 Application(s) Topical <User Schedule>  emtricitabine 200 mG/tenofovir alafenamide 25 mG (DESCOVY) Tablet 1 Tablet(s) Oral daily  heparin  Injectable 5000 Unit(s) SubCutaneous every 8 hours  metoprolol tartrate 25 milliGRAM(s) Oral two times a day  pantoprazole    Tablet 40 milliGRAM(s) Oral before breakfast  sodium chloride 0.9%. 1000 milliLiter(s) IV Continuous <Continuous>    PRN MEDICATIONS  acetaminophen   Tablet .. 650 milliGRAM(s) Oral every 6 hours PRN  acetaminophen   Tablet .. 650 milliGRAM(s) Oral every 6 hours PRN  oxyCODONE    5 mG/acetaminophen 325 mG 2 Tablet(s) Oral every 4 hours PRN  traMADol 50 milliGRAM(s) Oral three times a day PRN    VITALS:  T(F): 97.4  HR: 73  BP: 114/73  RR: 18  SpO2: --    PHYSICAL EXAM  GEN: NAD, Resting comfortably in bed  PULM: Clear to auscultation bilaterally, No wheezes  CVS: Regular rate and rhythm, S1-S2, no murmurs  ABD: Soft, non-tender, non-distended, no guarding  EXT: No edema  HEENT: Improved swelling at the level of the angle of the mandible on the left. With tenderness to palpation of the left jaw. improving  NEURO: AAOx3, no focal deficits      LABS                        13.8   6.84  )-----------( 149      ( 07 Sep 2019 06:18 )             39.8     09-06    138  |  102  |  11  ----------------------------<  128<H>  4.0   |  21  |  0.8    Ca    9.0      06 Sep 2019 06:02                CARDIAC MARKERS ( 06 Sep 2019 06:02 )  x     / 0.21 ng/mL / x     / x     / x          RADIOLOGY    < from: CT Chest No Cont (09.05.19 @ 13:54) >  MPRESSION:    Severe coronary artery calcifications (for example: series 5/147)    Great vessels normal in caliber.  Minimal calcification proximal aorta (6/22).  Mild calcification aortic arch (6/28)  Noncalcified ascending aorta.    Borderline dilatation, lower lobe and upper lobe bronchi.     No honeycombing, consolidation or effusions.    < end of copied text >    < from: CT Neck Soft Tissue w/ IV Cont (09.04.19 @ 13:17) >    IMPRESSION:    Mild submandibular infiltrative changes greater on the left which can be   seen in the setting of cellulitis. No evidence of abscess.    < end of copied text >    < from: Xray Chest 2 Views PA/Lat (09.01.19 @ 19:12) >  Impression:      No radiographic evidence of acute cardiopulmonary disease.    < end of copied text >    PRELIMINARY REPORT< from: [facility] Duplex Carotid, Bilat (09.05.19 @ 16:55) >  Impression:    20-39% stenosis of the right internal carotid artery.    40-59 % stenosis of the left internal carotid artery.    ICD-10: I65.8    < end of copied text >

## 2019-09-07 NOTE — PROGRESS NOTE ADULT - SUBJECTIVE AND OBJECTIVE BOX
OPERATIVE PROCEDURE(s):             pre-op CABG                    48yMale  SURGEON(s): VIDHI Roldan  SUBJECTIVE ASSESSMENT: s/p dental extractions, feeling better  Vital Signs Last 24 Hrs  T(F): 95.4 (07 Sep 2019 14:00), Max: 97.4 (07 Sep 2019 06:00)  HR: 55 (07 Sep 2019 14:00) (55 - 74)  BP: 129/58 (07 Sep 2019 14:00) (114/73 - 158/84)  RR: 18 (07 Sep 2019 14:00) (18 -       Net:   I&O's Detail    05 Sep 2019 07:01  -  06 Sep 2019 07:00  --------------------------------------------------------  Total NET: 360 mL      06 Sep 2019 07:01  -  07 Sep 2019 07:00  --------------------------------------------------------  Total NET: 450 mL        CAPILLARY BLOOD GLUCOSE  142 (07 Sep 2019 08:00)      POCT Blood Glucose.: 130 mg/dL (07 Sep 2019 12:10)  POCT Blood Glucose.: 142 mg/dL (07 Sep 2019 08:04)  POCT Blood Glucose.: 164 mg/dL (06 Sep 2019 22:07)    Physical Exam:  General: NAD; A&Ox3  Cardiac: S1/S2, RRR, no murmur, no rubs  Lungs: unlabored respirations, CTA b/l,  Extremities: No edema b/l lower extremitieb/l    LABS:                        13.8<L>  6.84  )-----------( 149      ( 07 Sep 2019 06:18 )             39.8<L>                        11.9<L>  5.52  )-----------( 129<L>    ( 06 Sep 2019 06:02 )             34.9<L>    09-07    139  |  101  |  10  ----------------------------<  155<H>  3.9   |  25  |  1.0  09-06    138  |  102  |  11  ----------------------------<  128<H>  4.0   |  21  |  0.8    Ca    9.1      07 Sep 2019 06:18    MEDICATIONS  (STANDING):  abacavir 600 mG/dolutegravir 50 mG/lamiVUDine 300 mG 1 Tablet(s) Oral daily  ALBUTerol/ipratropium for Nebulization 3 milliLiter(s) Nebulizer every 6 hours  ampicillin/sulbactam  IVPB 3 Gram(s) IV Intermittent every 6 hours  aspirin enteric coated 81 milliGRAM(s) Oral daily  atorvastatin 40 milliGRAM(s) Oral at bedtime  chlorhexidine 4% Liquid 1 Application(s) Topical <User Schedule>  docusate sodium 100 milliGRAM(s) Oral two times a day  emtricitabine 200 mG/tenofovir alafenamide 25 mG (DESCOVY) Tablet 1 Tablet(s) Oral daily  guaiFENesin  milliGRAM(s) Oral every 12 hours  heparin  Injectable 5000 Unit(s) SubCutaneous every 8 hours  metoprolol tartrate 25 milliGRAM(s) Oral two times a day  pantoprazole    Tablet 40 milliGRAM(s) Oral before breakfast  sodium chloride 0.9%. 1000 milliLiter(s) (75 mL/Hr) IV Continuous <Continuous>    MEDICATIONS  (PRN):  acetaminophen   Tablet .. 650 milliGRAM(s) Oral every 6 hours PRN Temp greater or equal to 38C (100.4F)  acetaminophen   Tablet .. 650 milliGRAM(s) Oral every 6 hours PRN Mild Pain (1 - 3)  oxyCODONE    5 mG/acetaminophen 325 mG 2 Tablet(s) Oral every 4 hours PRN Severe Pain (7 - 10)  traMADol 50 milliGRAM(s) Oral three times a day PRN Moderate Pain (4 - 6)    Allergies    No Known Allergies    Intolerances      Ambulation/Activity Status:    Assessment/Plan:  48y Male pre-op CABG.  - Case and plan discussed with CTU Intensivist and CT Surgeon - Dr. Roldan/Lata/Patrizia   - Continue CTU supportive care    - Continue DVT/GI prophylaxis  - Incentive Spirometry 10 times an hour  - Continue to advance physical activity as tolerated and continue PT/OT as directed  - continue IV antibiotics  Social Service Disposition:  anticipate d/c home after surgery

## 2019-09-08 LAB
APPEARANCE UR: CLEAR — SIGNIFICANT CHANGE UP
BILIRUB UR-MCNC: NEGATIVE — SIGNIFICANT CHANGE UP
COLOR SPEC: YELLOW — SIGNIFICANT CHANGE UP
DIFF PNL FLD: NEGATIVE — SIGNIFICANT CHANGE UP
GLUCOSE BLDC GLUCOMTR-MCNC: 144 MG/DL — HIGH (ref 70–99)
GLUCOSE BLDC GLUCOMTR-MCNC: 154 MG/DL — HIGH (ref 70–99)
GLUCOSE BLDC GLUCOMTR-MCNC: 180 MG/DL — HIGH (ref 70–99)
GLUCOSE BLDC GLUCOMTR-MCNC: 246 MG/DL — HIGH (ref 70–99)
GLUCOSE UR QL: NEGATIVE — SIGNIFICANT CHANGE UP
KETONES UR-MCNC: NEGATIVE — SIGNIFICANT CHANGE UP
LEUKOCYTE ESTERASE UR-ACNC: NEGATIVE — SIGNIFICANT CHANGE UP
MRSA PCR RESULT.: NEGATIVE — SIGNIFICANT CHANGE UP
NITRITE UR-MCNC: NEGATIVE — SIGNIFICANT CHANGE UP
PH UR: 6.5 — SIGNIFICANT CHANGE UP (ref 5–8)
PROT UR-MCNC: ABNORMAL
SP GR SPEC: 1.01 — SIGNIFICANT CHANGE UP (ref 1.01–1.02)
UROBILINOGEN FLD QL: SIGNIFICANT CHANGE UP

## 2019-09-08 PROCEDURE — 99231 SBSQ HOSP IP/OBS SF/LOW 25: CPT

## 2019-09-08 RX ORDER — BENZOCAINE AND MENTHOL 5; 1 G/100ML; G/100ML
1 LIQUID ORAL ONCE
Refills: 0 | Status: DISCONTINUED | OUTPATIENT
Start: 2019-09-08 | End: 2019-09-10

## 2019-09-08 RX ADMIN — HEPARIN SODIUM 5000 UNIT(S): 5000 INJECTION INTRAVENOUS; SUBCUTANEOUS at 06:22

## 2019-09-08 RX ADMIN — Medication 81 MILLIGRAM(S): at 12:03

## 2019-09-08 RX ADMIN — AMPICILLIN SODIUM AND SULBACTAM SODIUM 200 GRAM(S): 250; 125 INJECTION, POWDER, FOR SUSPENSION INTRAMUSCULAR; INTRAVENOUS at 06:23

## 2019-09-08 RX ADMIN — HEPARIN SODIUM 5000 UNIT(S): 5000 INJECTION INTRAVENOUS; SUBCUTANEOUS at 15:32

## 2019-09-08 RX ADMIN — Medication 100 MILLIGRAM(S): at 06:23

## 2019-09-08 RX ADMIN — CHLORHEXIDINE GLUCONATE 1 APPLICATION(S): 213 SOLUTION TOPICAL at 06:23

## 2019-09-08 RX ADMIN — Medication 25 MILLIGRAM(S): at 06:23

## 2019-09-08 RX ADMIN — HEPARIN SODIUM 5000 UNIT(S): 5000 INJECTION INTRAVENOUS; SUBCUTANEOUS at 22:30

## 2019-09-08 RX ADMIN — Medication 100 MILLIGRAM(S): at 18:25

## 2019-09-08 RX ADMIN — AMPICILLIN SODIUM AND SULBACTAM SODIUM 200 GRAM(S): 250; 125 INJECTION, POWDER, FOR SUSPENSION INTRAMUSCULAR; INTRAVENOUS at 12:03

## 2019-09-08 RX ADMIN — AMPICILLIN SODIUM AND SULBACTAM SODIUM 200 GRAM(S): 250; 125 INJECTION, POWDER, FOR SUSPENSION INTRAMUSCULAR; INTRAVENOUS at 18:25

## 2019-09-08 RX ADMIN — PANTOPRAZOLE SODIUM 40 MILLIGRAM(S): 20 TABLET, DELAYED RELEASE ORAL at 06:23

## 2019-09-08 RX ADMIN — Medication 25 MILLIGRAM(S): at 18:25

## 2019-09-08 RX ADMIN — ATORVASTATIN CALCIUM 40 MILLIGRAM(S): 80 TABLET, FILM COATED ORAL at 22:31

## 2019-09-08 RX ADMIN — Medication 600 MILLIGRAM(S): at 06:23

## 2019-09-08 NOTE — PROGRESS NOTE ADULT - SUBJECTIVE AND OBJECTIVE BOX
OPERATIVE PROCEDURE(s):             pre-op CABG                    48yMale  SURGEON(s): VIDHI Roldan  SUBJECTIVE ASSESSMENT: no complaints, jaw pain much improved, swelling better  Vital Signs Last 24 Hrs  T(F): 96.8 (08 Sep 2019 14:00), Max: 97.2 (08 Sep 2019 05:08)  HR: 69 (08 Sep 2019 14:00) (65 - 74)  BP: 99/56 (08 Sep 2019 14:00) (99/56 - 123/68)  BP(mean): --  ABP: --  ABP(mean): --  RR: 17 (08 Sep 2019 14:00) (17 - 18)  SpO2: 96% (07 Sep 2019 20:08) (96% - 96%)  CVP(mm Hg): --  CVP(cm H2O): --  CO: --  CI: --  PA: --  SVR: --    I&O's Detail    07 Sep 2019 07:01  -  08 Sep 2019 07:00  --------------------------------------------------------  IN:    IV PiggyBack: 200 mL    Oral Fluid: 1180 mL    sodium chloride 0.9%: 300 mL  Total IN: 1680 mL    OUT:    Voided: 690 mL  Total OUT: 690 mL        Net:   I&O's Detail    06 Sep 2019 07:01  -  07 Sep 2019 07:00  --------------------------------------------------------  Total NET: 450 mL      07 Sep 2019 07:01  -  08 Sep 2019 07:00  --------------------------------------------------------  Total NET: 990 mL        CAPILLARY BLOOD GLUCOSE      POCT Blood Glucose.: 180 mg/dL (08 Sep 2019 11:34)  POCT Blood Glucose.: 144 mg/dL (08 Sep 2019 07:46)  POCT Blood Glucose.: 137 mg/dL (07 Sep 2019 22:06)  POCT Blood Glucose.: 129 mg/dL (07 Sep 2019 16:58)    Physical Exam:  General: NAD; A&Ox3  Cardiac: S1/S2, RRR, no murmur, no rubs  Lungs: unlabored respirations, CTA b/l, no wheeze, no rales, no crackles  Abdomen: Soft/NT/ND; positive bowel sounds x 4  Extremities: No edema b/l lower extremities        LABS:                        13.8<L>  6.84  )-----------( 149      ( 07 Sep 2019 06:18 )             39.8<L>                        11.9<L>  5.52  )-----------( 129<L>    ( 06 Sep 2019 06:02 )             34.9<L>    09-07    139  |  101  |  10  ----------------------------<  155<H>  3.9   |  25  |  1.0  09-06    138  |  102  |  11  ----------------------------<  128<H>  4.0   |  21  |  0.8    Ca    9.1      07 Sep 2019 06:18    MEDICATIONS  (STANDING):  abacavir 600 mG/dolutegravir 50 mG/lamiVUDine 300 mG 1 Tablet(s) Oral daily  ALBUTerol/ipratropium for Nebulization 3 milliLiter(s) Nebulizer every 6 hours  ampicillin/sulbactam  IVPB 3 Gram(s) IV Intermittent every 6 hours  aspirin enteric coated 81 milliGRAM(s) Oral daily  atorvastatin 40 milliGRAM(s) Oral at bedtime  chlorhexidine 4% Liquid 1 Application(s) Topical <User Schedule>  docusate sodium 100 milliGRAM(s) Oral two times a day  guaiFENesin  milliGRAM(s) Oral every 12 hours  heparin  Injectable 5000 Unit(s) SubCutaneous every 8 hours  metoprolol tartrate 25 milliGRAM(s) Oral two times a day  pantoprazole    Tablet 40 milliGRAM(s) Oral before breakfast    MEDICATIONS  (PRN):  acetaminophen   Tablet .. 650 milliGRAM(s) Oral every 6 hours PRN Temp greater or equal to 38C (100.4F)  acetaminophen   Tablet .. 650 milliGRAM(s) Oral every 6 hours PRN Mild Pain (1 - 3)  benzocaine 15 mG/menthol 3.6 mG Lozenge 1 Lozenge Oral once PRN Sore Throat  oxyCODONE    5 mG/acetaminophen 325 mG 2 Tablet(s) Oral every 4 hours PRN Severe Pain (7 - 10)  traMADol 50 milliGRAM(s) Oral three times a day PRN Moderate Pain (4 - 6)    Allergies    No Known Allergies    Intolerances      Ambulation/Activity Status:    Assessment/Plan:  48y Male pre-op CABG  - Case and plan discussed with CTU Intensivist and CT Surgeon - Dr. Roldan/Lata/Patrizia   - Continue CTU supportive care    - Continue DVT/GI prophylaxis  - Incentive Spirometry 10 times an hour  - Continue to advance physical activity as tolerated and continue PT/OT as directed  - CAD: Continue ASA, statin, BB  - cardiology not appreciated will not start ACE or ARB till after surgery  - continue nebulizers and mucinex  - continue IV antibiotics for dental issue  - may shower today off telemetry  - transfer to CTU  later today    Social Service Disposition:  anticipate home after surgery

## 2019-09-08 NOTE — PROGRESS NOTE ADULT - ASSESSMENT
1] Severe 3 Vessel CAD with cardiomyopathy.  Stage B CHF.  - Awaiting CABG  - Continue Metoprolol as prescribed  - Aspirin 81 mg tablet daily   - Consider starting low dose ACEI (Enalapril 2.5 mg tablet q 12 hrs) or ARB (Losartan 25 mg tablet q bedtime)  - For transfer to CTU     2] Hyperlipidemia  - Continue medications    3] Type II DM  - Glycemic control    4] HIV  - stable    Plan discussed with patient and Nursing Staff    Remington Griffin MD  [phone] Office

## 2019-09-08 NOTE — PROGRESS NOTE ADULT - SUBJECTIVE AND OBJECTIVE BOX
Patient was seen and examined by me on 3C.      He is ambulating in the unit without any chest pain and shortness of breath.  He denies any jaw pain.      Vitals:  T(C): 36.2 (09-08-19 @ 05:08), Max: 36.6 (09-06-19 @ 13:34)  HR: 65 (09-08-19 @ 05:08) (55 - 74)  BP: 121/65 (09-08-19 @ 05:08) (114/73 - 158/84)  RR: 18 (09-07-19 @ 22:00) (18 - 18)  SpO2: 96% (09-07-19 @ 20:08) (96% - 96%)      Telemetry: Sinus Rhythm.  No events.    LABS:                        13.8   6.84  )-----------( 149      ( 07 Sep 2019 06:18 )             39.8     09-07    139  |  101  |  10  ----------------------------<  155<H>  3.9   |  25  |  1.0    Ca    9.1      07 Sep 2019 06:18        MEDICATIONS  (STANDING):  abacavir 600 mG/dolutegravir 50 mG/lamiVUDine 300 mG 1 Tablet(s) Oral daily  ALBUTerol/ipratropium for Nebulization 3 milliLiter(s) Nebulizer every 6 hours  ampicillin/sulbactam  IVPB 3 Gram(s) IV Intermittent every 6 hours  aspirin enteric coated 81 milliGRAM(s) Oral daily  atorvastatin 40 milliGRAM(s) Oral at bedtime  chlorhexidine 4% Liquid 1 Application(s) Topical <User Schedule>  docusate sodium 100 milliGRAM(s) Oral two times a day  guaiFENesin  milliGRAM(s) Oral every 12 hours  heparin  Injectable 5000 Unit(s) SubCutaneous every 8 hours  metoprolol tartrate 25 milliGRAM(s) Oral two times a day  pantoprazole    Tablet 40 milliGRAM(s) Oral before breakfast    MEDICATIONS  (PRN):  acetaminophen   Tablet .. 650 milliGRAM(s) Oral every 6 hours PRN Temp greater or equal to 38C (100.4F)  acetaminophen   Tablet .. 650 milliGRAM(s) Oral every 6 hours PRN Mild Pain (1 - 3)  benzocaine 15 mG/menthol 3.6 mG Lozenge 1 Lozenge Oral once PRN Sore Throat  oxyCODONE    5 mG/acetaminophen 325 mG 2 Tablet(s) Oral every 4 hours PRN Severe Pain (7 - 10)  traMADol 50 milliGRAM(s) Oral three times a day PRN Moderate Pain (4 - 6)      PHYSICAL EXAM:  Constitutional: appears stated age, well developed/nourished, no acute distress  Eyes: EOM's intact.  PERRLA  ENMT: Normocephalic, atraumatic.    Neck: Jugular veins non-distended; no carotid bruits bilaterally.  Respiratory: respiratory pattern unlabored;lungs clear to auscultation bilaterally.  Cardiovascular: Regular rhythm.  S1 and S2 normal.  No murmur nor rub appreciated.  Abdomen: Soft, non-tender.  Normal bowel sounds.  Extremities: extremities warm; no  edema.  Pulses: Intact bilaterally  Skin: No gross abnormalities noted.  Musculoskeletal: No gross deformities  Neurological: Alert, oriented x 3.  No focal neurologic deficits noted.

## 2019-09-09 LAB
ALBUMIN SERPL ELPH-MCNC: 4.2 G/DL — SIGNIFICANT CHANGE UP (ref 3.5–5.2)
ALP SERPL-CCNC: 59 U/L — SIGNIFICANT CHANGE UP (ref 30–115)
ALT FLD-CCNC: 51 U/L — HIGH (ref 0–41)
ANION GAP SERPL CALC-SCNC: 12 MMOL/L — SIGNIFICANT CHANGE UP (ref 7–14)
APTT BLD: 35 SEC — SIGNIFICANT CHANGE UP (ref 27–39.2)
AST SERPL-CCNC: 22 U/L — SIGNIFICANT CHANGE UP (ref 0–41)
BILIRUB SERPL-MCNC: 0.3 MG/DL — SIGNIFICANT CHANGE UP (ref 0.2–1.2)
BLD GP AB SCN SERPL QL: SIGNIFICANT CHANGE UP
BUN SERPL-MCNC: 15 MG/DL — SIGNIFICANT CHANGE UP (ref 10–20)
CALCIUM SERPL-MCNC: 9.2 MG/DL — SIGNIFICANT CHANGE UP (ref 8.5–10.1)
CHLORIDE SERPL-SCNC: 101 MMOL/L — SIGNIFICANT CHANGE UP (ref 98–110)
CO2 SERPL-SCNC: 25 MMOL/L — SIGNIFICANT CHANGE UP (ref 17–32)
CREAT SERPL-MCNC: 0.9 MG/DL — SIGNIFICANT CHANGE UP (ref 0.7–1.5)
GLUCOSE BLDC GLUCOMTR-MCNC: 122 MG/DL — HIGH (ref 70–99)
GLUCOSE BLDC GLUCOMTR-MCNC: 147 MG/DL — HIGH (ref 70–99)
GLUCOSE BLDC GLUCOMTR-MCNC: 203 MG/DL — HIGH (ref 70–99)
GLUCOSE SERPL-MCNC: 162 MG/DL — HIGH (ref 70–99)
HCT VFR BLD CALC: 37.1 % — LOW (ref 42–52)
HGB BLD-MCNC: 12.6 G/DL — LOW (ref 14–18)
INR BLD: 1.06 RATIO — SIGNIFICANT CHANGE UP (ref 0.65–1.3)
MAGNESIUM SERPL-MCNC: 2 MG/DL — SIGNIFICANT CHANGE UP (ref 1.8–2.4)
MCHC RBC-ENTMCNC: 29.2 PG — SIGNIFICANT CHANGE UP (ref 27–31)
MCHC RBC-ENTMCNC: 34 G/DL — SIGNIFICANT CHANGE UP (ref 32–37)
MCV RBC AUTO: 85.9 FL — SIGNIFICANT CHANGE UP (ref 80–94)
NRBC # BLD: 0 /100 WBCS — SIGNIFICANT CHANGE UP (ref 0–0)
PLATELET # BLD AUTO: 162 K/UL — SIGNIFICANT CHANGE UP (ref 130–400)
POTASSIUM SERPL-MCNC: 3.9 MMOL/L — SIGNIFICANT CHANGE UP (ref 3.5–5)
POTASSIUM SERPL-SCNC: 3.9 MMOL/L — SIGNIFICANT CHANGE UP (ref 3.5–5)
PROT SERPL-MCNC: 6.8 G/DL — SIGNIFICANT CHANGE UP (ref 6–8)
PROTHROM AB SERPL-ACNC: 12.2 SEC — SIGNIFICANT CHANGE UP (ref 9.95–12.87)
RBC # BLD: 4.32 M/UL — LOW (ref 4.7–6.1)
RBC # FLD: 12.7 % — SIGNIFICANT CHANGE UP (ref 11.5–14.5)
SODIUM SERPL-SCNC: 138 MMOL/L — SIGNIFICANT CHANGE UP (ref 135–146)
WBC # BLD: 7.13 K/UL — SIGNIFICANT CHANGE UP (ref 4.8–10.8)
WBC # FLD AUTO: 7.13 K/UL — SIGNIFICANT CHANGE UP (ref 4.8–10.8)

## 2019-09-09 PROCEDURE — 99231 SBSQ HOSP IP/OBS SF/LOW 25: CPT | Mod: 57

## 2019-09-09 PROCEDURE — 99233 SBSQ HOSP IP/OBS HIGH 50: CPT

## 2019-09-09 RX ORDER — GLUCAGON INJECTION, SOLUTION 0.5 MG/.1ML
1 INJECTION, SOLUTION SUBCUTANEOUS ONCE
Refills: 0 | Status: DISCONTINUED | OUTPATIENT
Start: 2019-09-09 | End: 2019-09-10

## 2019-09-09 RX ORDER — DEXTROSE 50 % IN WATER 50 %
12.5 SYRINGE (ML) INTRAVENOUS ONCE
Refills: 0 | Status: DISCONTINUED | OUTPATIENT
Start: 2019-09-09 | End: 2019-09-10

## 2019-09-09 RX ORDER — INSULIN LISPRO 100/ML
VIAL (ML) SUBCUTANEOUS
Refills: 0 | Status: DISCONTINUED | OUTPATIENT
Start: 2019-09-09 | End: 2019-09-10

## 2019-09-09 RX ORDER — ALBUMIN HUMAN 25 %
3000 VIAL (ML) INTRAVENOUS ONCE
Refills: 0 | Status: DISCONTINUED | OUTPATIENT
Start: 2019-09-10 | End: 2019-09-10

## 2019-09-09 RX ORDER — DEXTROSE 50 % IN WATER 50 %
25 SYRINGE (ML) INTRAVENOUS ONCE
Refills: 0 | Status: DISCONTINUED | OUTPATIENT
Start: 2019-09-09 | End: 2019-09-10

## 2019-09-09 RX ORDER — DEXTROSE 50 % IN WATER 50 %
15 SYRINGE (ML) INTRAVENOUS ONCE
Refills: 0 | Status: DISCONTINUED | OUTPATIENT
Start: 2019-09-09 | End: 2019-09-10

## 2019-09-09 RX ORDER — METOPROLOL TARTRATE 50 MG
25 TABLET ORAL
Refills: 0 | Status: COMPLETED | OUTPATIENT
Start: 2019-09-09 | End: 2019-09-09

## 2019-09-09 RX ORDER — SODIUM CHLORIDE 9 MG/ML
1000 INJECTION, SOLUTION INTRAVENOUS
Refills: 0 | Status: DISCONTINUED | OUTPATIENT
Start: 2019-09-09 | End: 2019-09-10

## 2019-09-09 RX ORDER — HEPARIN SODIUM 5000 [USP'U]/ML
5000 INJECTION INTRAVENOUS; SUBCUTANEOUS EVERY 8 HOURS
Refills: 0 | Status: COMPLETED | OUTPATIENT
Start: 2019-09-09 | End: 2019-09-09

## 2019-09-09 RX ORDER — CHLORHEXIDINE GLUCONATE 213 G/1000ML
1 SOLUTION TOPICAL ONCE
Refills: 0 | Status: COMPLETED | OUTPATIENT
Start: 2019-09-09 | End: 2019-09-09

## 2019-09-09 RX ORDER — CHLORHEXIDINE GLUCONATE 213 G/1000ML
15 SOLUTION TOPICAL ONCE
Refills: 0 | Status: COMPLETED | OUTPATIENT
Start: 2019-09-09 | End: 2019-09-10

## 2019-09-09 RX ADMIN — Medication 100 MILLIGRAM(S): at 05:58

## 2019-09-09 RX ADMIN — AMPICILLIN SODIUM AND SULBACTAM SODIUM 200 GRAM(S): 250; 125 INJECTION, POWDER, FOR SUSPENSION INTRAMUSCULAR; INTRAVENOUS at 17:51

## 2019-09-09 RX ADMIN — HEPARIN SODIUM 5000 UNIT(S): 5000 INJECTION INTRAVENOUS; SUBCUTANEOUS at 15:41

## 2019-09-09 RX ADMIN — Medication 100 MILLIGRAM(S): at 17:50

## 2019-09-09 RX ADMIN — AMPICILLIN SODIUM AND SULBACTAM SODIUM 200 GRAM(S): 250; 125 INJECTION, POWDER, FOR SUSPENSION INTRAMUSCULAR; INTRAVENOUS at 23:26

## 2019-09-09 RX ADMIN — CHLORHEXIDINE GLUCONATE 1 APPLICATION(S): 213 SOLUTION TOPICAL at 05:59

## 2019-09-09 RX ADMIN — AMPICILLIN SODIUM AND SULBACTAM SODIUM 200 GRAM(S): 250; 125 INJECTION, POWDER, FOR SUSPENSION INTRAMUSCULAR; INTRAVENOUS at 12:15

## 2019-09-09 RX ADMIN — HEPARIN SODIUM 5000 UNIT(S): 5000 INJECTION INTRAVENOUS; SUBCUTANEOUS at 05:58

## 2019-09-09 RX ADMIN — Medication 4: at 11:25

## 2019-09-09 RX ADMIN — HEPARIN SODIUM 5000 UNIT(S): 5000 INJECTION INTRAVENOUS; SUBCUTANEOUS at 21:19

## 2019-09-09 RX ADMIN — AMPICILLIN SODIUM AND SULBACTAM SODIUM 200 GRAM(S): 250; 125 INJECTION, POWDER, FOR SUSPENSION INTRAMUSCULAR; INTRAVENOUS at 00:05

## 2019-09-09 RX ADMIN — ATORVASTATIN CALCIUM 40 MILLIGRAM(S): 80 TABLET, FILM COATED ORAL at 21:19

## 2019-09-09 RX ADMIN — PANTOPRAZOLE SODIUM 40 MILLIGRAM(S): 20 TABLET, DELAYED RELEASE ORAL at 05:58

## 2019-09-09 RX ADMIN — CHLORHEXIDINE GLUCONATE 1 APPLICATION(S): 213 SOLUTION TOPICAL at 21:00

## 2019-09-09 RX ADMIN — Medication 25 MILLIGRAM(S): at 17:50

## 2019-09-09 RX ADMIN — AMPICILLIN SODIUM AND SULBACTAM SODIUM 200 GRAM(S): 250; 125 INJECTION, POWDER, FOR SUSPENSION INTRAMUSCULAR; INTRAVENOUS at 05:59

## 2019-09-09 RX ADMIN — Medication 600 MILLIGRAM(S): at 17:50

## 2019-09-09 RX ADMIN — Medication 25 MILLIGRAM(S): at 05:57

## 2019-09-09 RX ADMIN — Medication 600 MILLIGRAM(S): at 05:58

## 2019-09-09 NOTE — CONSULT NOTE ADULT - CONSULT REQUESTED BY NAME
Dr. Dionisio Roldan
Dr. Hilda Streeter
ER
Jake Bowling
primary team
primary team
sicat
Medicine
medicine

## 2019-09-09 NOTE — DIETITIAN INITIAL EVALUATION ADULT. - ENERGY NEEDS
estimated calorie needs: 2040 - 2210 kcals/day (MSJ x 1.2 - 1.3 AF)  estimated protein needs: 85 - 102 gms/kg (1.0 - 1.2 gm/kg lean towards higher end after CABG)  estimated fluid needs: per CTU team

## 2019-09-09 NOTE — CONSULT NOTE ADULT - SUBJECTIVE AND OBJECTIVE BOX
49 yo M with HIV on ART, DM (HgA1C 6.3), DLD, recent cavity filling 8/26, presents to the ED for worsened left lower molar pain for 1 week, found to have ST elevations V2-3 and elevated CE, fever. Of note did not c/o chest pain at any point. Recent EKG/Stress test wnl. Cardiac Cath with multivessel disease.     - off dirps    Vital Signs Last 24 Hrs  T(C): 36.4 (09 Sep 2019 05:00), Max: 36.9 (08 Sep 2019 22:50)  T(F): 97.6 (09 Sep 2019 05:00), Max: 98.5 (08 Sep 2019 22:50)  HR: 67 (09 Sep 2019 08:00) (62 - 75)  BP: 106/63 (09 Sep 2019 08:00) (99/56 - 134/73)  BP(mean): 80 (09 Sep 2019 08:00) (80 - 98)  RR: 15 (09 Sep 2019 06:00) (15 - 18)  SpO2: 99% (09 Sep 2019 08:00) (97% - 100%)    alert, awake, verbal  follows commands appropriately  no JVD  S1S2 reg rate  b/l air entry, no wheeze, good air entry b/l  soft abdomen, non tender, non distended  warm periphery with + distal pulses, non pitting edema b/l  power 5/5 b/l in all 4 ext    WBC 7.13, hg 12.6, plt 162  INR 1.1  BUn 15, Cr 0.9, ALT 51    a/p:    Coronary artery disease  DM  Tooth pain s/p extraction    IV Unasyn until 9/10 as per ID  PO ASA, Statin and beta blocker  Cont with HAART  Glycemic control  OOB to chair  Pre OP for OR

## 2019-09-09 NOTE — DIETITIAN INITIAL EVALUATION ADULT. - PHYSICAL APPEARANCE
BMI 28.4 IBW: 70 kg. Pt reports # he reports weight loss likely d/t skipping meals. BS 22 surg incision/overweight

## 2019-09-09 NOTE — CONSULT NOTE ADULT - CONSULT REQUESTED DATE/TIME
01-Sep-2019 22:19
02-Sep-2019 02:10
02-Sep-2019 12:31
03-Sep-2019 08:08
05-Sep-2019 12:17
05-Sep-2019 16:40
09-Sep-2019 10:05
03-Sep-2019 13:48
06-Sep-2019 11:54

## 2019-09-09 NOTE — PRE-ANESTHESIA EVALUATION ADULT - LAST ECHOCARDIOGRAM
LVEF 40-45%, Akinetic mid& apical anterior septum, mid & apical inferior septum, mid& apical inferior wall, and apex. Hypokinetic basal inferoseptal segment. No valvular abnormalities. Right heart is wnl

## 2019-09-09 NOTE — PROGRESS NOTE ADULT - SUBJECTIVE AND OBJECTIVE BOX
OPERATIVE PROCEDURE(s):                                   48yMale  SURGEON(s): VIDHI Roldan  SUBJECTIVE ASSESSMENT:   Vital Signs Last 24 Hrs  T(F): 97.6 (09 Sep 2019 05:00), Max: 98.5 (08 Sep 2019 22:50)  HR: 67 (09 Sep 2019 08:00) (62 - 75)  BP: 106/63 (09 Sep 2019 08:00) (99/56 - 134/73)  BP(mean): 80 (09 Sep 2019 08:00) (80 - 98)  ABP: --  ABP(mean): --  RR: 15 (09 Sep 2019 06:00) (15 - 18)  SpO2: 99% (09 Sep 2019 08:00) (97% - 100%)  CVP(mm Hg): --  CVP(cm H2O): --  CO: --  CI: --  PA: --  SVR: --    I&O's Detail    08 Sep 2019 07:01  -  09 Sep 2019 07:00  --------------------------------------------------------  IN:    Oral Fluid: 1280 mL  Total IN: 1280 mL    OUT:    Voided: 1775 mL  Total OUT: 1775 mL        Net:   I&O's Detail    07 Sep 2019 07:01  -  08 Sep 2019 07:00  --------------------------------------------------------  Total NET: 990 mL      08 Sep 2019 07:01  -  09 Sep 2019 07:00  --------------------------------------------------------  Total NET: -495 mL        CAPILLARY BLOOD GLUCOSE      POCT Blood Glucose.: 147 mg/dL (09 Sep 2019 07:13)  POCT Blood Glucose.: 246 mg/dL (08 Sep 2019 21:30)  POCT Blood Glucose.: 154 mg/dL (08 Sep 2019 16:43)  POCT Blood Glucose.: 180 mg/dL (08 Sep 2019 11:34)    Physical Exam:  General: NAD; A&Ox3  Cardiac: S1/S2, RRR, no murmur, no rubs  Lungs: unlabored respirations, CTA b/l, no wheeze, no rales, no crackles  Abdomen: Soft/NT/ND; positive bowel sounds x 4  Sternum: Intact, no click, incision healing well with no drainage  Incisions: Incisions clean/dry/intact  Extremities: No edema b/l lower extremities; good capillary refill; no cyanosis; palpable 1+ pedal pulses b/l    Central Venous Catheter: Yes[]  No[] , If Yes indication:           Day #  Lane Catheter: Yes  [] , No  [] , If yes indication:                      Day #  NGT: Yes [] No [] ,    If Yes Placement:                                     Day #  EPICARDIAL WIRES:  [] YES [] NO                                              Day #  BOWEL MOVEMENT:  [] YES [] NO, If No, Timing since last BM:      Day #  CHEST TUBE(Left/Right):  [] YES [] NO, If yes -  AIR LEAKS:  [] YES [] NO        LABS:                        12.6<L>  7.13  )-----------( 162      ( 09 Sep 2019 03:51 )             37.1<L>                        13.8<L>  6.84  )-----------( 149      ( 07 Sep 2019 06:18 )             39.8<L>        138  |  101  |  15  ----------------------------<  162<H>  3.9   |  25  |  0.9      139  |  101  |  10  ----------------------------<  155<H>  3.9   |  25  |  1.0    Ca    9.2      09 Sep 2019 03:51    TPro  6.8 [6.0 - 8.0]  /  Alb  4.2 [3.5 - 5.2]  /  TBili  0.3 [0.2 - 1.2]  /  DBili  x   /  AST  22 [0 - 41]  /  ALT  51<H> [0 - 41]  /  AlkPhos  59 [30 - 115]      PT/INR - ( 09 Sep 2019 03:51 )   PT: ;   INR: 1.06 ratio         PTT - ( 09 Sep 2019 03:51 )  PTT:35.0 sec  Urinalysis Basic - ( 08 Sep 2019 17:59 )    Color: Yellow / Appearance: Clear / S.015 / pH: x  Gluc: x / Ketone: Negative  / Bili: Negative / Urobili: <2 mg/dL   Blood: x / Protein: 30 mg/dL / Nitrite: Negative   Leuk Esterase: Negative / RBC: x / WBC x   Sq Epi: x / Non Sq Epi: x / Bacteria: x        RADIOLOGY & ADDITIONAL TESTS:  CXR:  EKG:  MEDICATIONS  (STANDING):  abacavir 600 mG/dolutegravir 50 mG/lamiVUDine 300 mG 1 Tablet(s) Oral daily  ALBUTerol/ipratropium for Nebulization 3 milliLiter(s) Nebulizer every 6 hours  ampicillin/sulbactam  IVPB 3 Gram(s) IV Intermittent every 6 hours  aspirin enteric coated 81 milliGRAM(s) Oral daily  atorvastatin 40 milliGRAM(s) Oral at bedtime  chlorhexidine 4% Liquid 1 Application(s) Topical <User Schedule>  docusate sodium 100 milliGRAM(s) Oral two times a day  guaiFENesin  milliGRAM(s) Oral every 12 hours  heparin  Injectable 5000 Unit(s) SubCutaneous every 8 hours  metoprolol tartrate 25 milliGRAM(s) Oral two times a day  pantoprazole    Tablet 40 milliGRAM(s) Oral before breakfast    MEDICATIONS  (PRN):  acetaminophen   Tablet .. 650 milliGRAM(s) Oral every 6 hours PRN Temp greater or equal to 38C (100.4F)  acetaminophen   Tablet .. 650 milliGRAM(s) Oral every 6 hours PRN Mild Pain (1 - 3)  benzocaine 15 mG/menthol 3.6 mG Lozenge 1 Lozenge Oral once PRN Sore Throat  oxyCODONE    5 mG/acetaminophen 325 mG 2 Tablet(s) Oral every 4 hours PRN Severe Pain (7 - 10)  traMADol 50 milliGRAM(s) Oral three times a day PRN Moderate Pain (4 - 6)    HEPARIN:  [] YES [] NO  Dose: XX UNITS/HR UNITS Q8H  LOVENOX:[] YES [] NO  Dose: XX mg Q24H  COUMADIN: []  YES [] NO  Dose: XX mg  Q24H  SCD's: YES b/l  GI Prophylaxis: Protonix [], Pepcid [], None [], (Contra-indication:.....)    Post-Op Beta-Blockers: Yes [], No[], If No, then contraindication:  Post-Op Aspirin: Yes [],  No [], If No, then contraindication:  Post-Op Statin: Yes [], No[], If No, then contraindication:  Allergies    No Known Allergies    Intolerances      Ambulation/Activity Status:    Assessment/Plan:  48y Male status-post .....  - Case and plan discussed with CTU Intensivist and CT Surgeon - Dr. Roldan/Lata/Patrizia   - Continue CTU supportive care    - Continue DVT/GI prophylaxis  - Incentive Spirometry 10 times an hour  - Continue to advance physical activity as tolerated and continue PT/OT as directed  1. CAD: Continue ASA, statin, BB  2. HTN:   3. A. Fib:   4. COPD/Hypoxia:   5. DM/Glucose Control:     Social Service Disposition: OPERATIVE PROCEDURE(s):      Pre-op CABG                             48yMale  SURGEON(s): VIDHI Roldan  SUBJECTIVE ASSESSMENT: feeling better less tooth pain  Vital Signs Last 24 Hrs  T(F): 97.6 (09 Sep 2019 05:00), Max: 98.5 (08 Sep 2019 22:50)  HR: 67 (09 Sep 2019 08:00) (62 - 75)  BP: 106/63 (09 Sep 2019 08:00) (99/56 - 134/73)  BP(mean): 80 (09 Sep 2019 08:00) (80 - 98)  RR: 15 (09 Sep 2019 06:00) (15 - 18)  SpO2: 99% (09 Sep 2019 08:00) (97% - 100%)    Net:   I&O's Detail    07 Sep 2019 07:01  -  08 Sep 2019 07:00  --------------------------------------------------------  Total NET: 990 mL      08 Sep 2019 07:01  -  09 Sep 2019 07:00  --------------------------------------------------------  Total NET: -495 mL    CAPILLARY BLOOD GLUCOSE    POCT Blood Glucose.: 147 mg/dL (09 Sep 2019 07:13)  POCT Blood Glucose.: 246 mg/dL (08 Sep 2019 21:30)  POCT Blood Glucose.: 154 mg/dL (08 Sep 2019 16:43)  POCT Blood Glucose.: 180 mg/dL (08 Sep 2019 11:34)    Physical Exam:  General: NAD; A&Ox3  Cardiac: S1/S2, RRR, no murmur, no rubs  Lungs: unlabored respirations, CTA b/l, no wheeze, no rales, no crackles  Abdomen: Soft/NT/ND; positive bowel sounds x 4  Extremities: No edema b/l lower extremities; good capillary refill; no cyanosis; palpable 1+ pedal pulses b/l       Five Meter 4.6 / 4.5 / 4.4    Blood pressure:  right 121/80  / left 133/ 78    LABS:                        12.6<L>  7.13  )-----------( 162      ( 09 Sep 2019 03:51 )             37.1<L>                        13.8<L>  6.84  )-----------( 149      ( 07 Sep 2019 06:18 )             39.8<L>        138  |  101  |  15  ----------------------------<  162<H>  3.9   |  25  |  0.9      139  |  101  |  10  ----------------------------<  155<H>  3.9   |  25  |  1.0    Ca    9.2      09 Sep 2019 03:51    TPro  6.8 [6.0 - 8.0]  /  Alb  4.2 [3.5 - 5.2]  /  TBili  0.3 [0.2 - 1.2]  /  DBili  x   /  AST  22 [0 - 41]  /  ALT  51<H> [0 - 41]  /  AlkPhos  59 [30 - 115]      PT/INR - ( 09 Sep 2019 03:51 )   PT: ;   INR: 1.06 ratio         PTT - ( 09 Sep 2019 03:51 )  PTT:35.0 sec  Urinalysis Basic - ( 08 Sep 2019 17:59 )    Color: Yellow / Appearance: Clear / S.015 / pH: x  Gluc: x / Ketone: Negative  / Bili: Negative / Urobili: <2 mg/dL   Blood: x / Protein: 30 mg/dL / Nitrite: Negative   Leuk Esterase: Negative / RBC: x / WBC x   Sq Epi: x / Non Sq Epi: x / Bacteria: x    Coronary circulation: There was 3-vessel coronary artery disease (LAD, RCA, and circumflex). Left main: Angiography showed moderate atherosclerosis predominantly in distal half of the vessel, no clinical lesions appreciated. LAD: Angiography showed multiple discrete lesions. Ostial LAD: There was a tubular 50 % stenosis. Proximal LAD: There was a tubular 99 % stenosis. There was KEN grade 2 flow through the vessel (partial perfusion). This lesion is a chronic total occlusion. Mid LAD: There was a 100 % stenosis. There was KEN grade 0 flow through the vessel (no flow). 1st diagonal: The vessel was small sized. Angiography showed moderate atherosclerosis with no clinical lesions appreciated. 2nd diagonal: The vessel was small to medium sized. Angiography showed multiple discrete lesions. There was a tubular 60 % stenosis in the proximal third of the vessel segment. In a second lesion, there was a tubular 50 % stenosis. Distal vessel angiography showed severe diffuse disease. Proximal circumflex: Normal. Mid circumflex: Angiography showed mild atherosclerosis with no flow limiting lesions. Distal circumflex: There was a tubular 60 % stenosis at the origin of OM2. Distal vessel angiography showed severe diffuse disease. 1st obtuse < from: CT Chest No Cont (19 @ 13:54) >  marginal: The vessel was small sized. Angiography showed severe atherosclerosis. 2nd obtuse marginal: The vessel was medium sized. There was a discrete 90 % stenosis in the proximal third of the vessel segment. 3rd obtuse marginal: Normal. RCA: The vessel was large sized. There was a 100 % stenosis in the proximal third of the vessel segment. There was KEN grade 1 flow through the vessel (slow flow without perfusion). This lesion is a chronic total occlusion. Right PDA: The distal vessel was supplied by collaterals from septal branches of LAD.     < from: 12 Lead ECG (19 @ 06:35) >  Diagnosis Line Normal sinus rhythm  Possible Left atrial enlargement  Anteroseptal infarct , age undetermined  Abnormal ECG    < end of copied text >  < from: VA Duplex Carotid, Bilat (19 @ 16:55) >  Impression:    20-39% stenosis of the right internal carotid artery.    40-59 % stenosis of the left int    < end of copied text >  < from: Transthoracic Echocardiogram (19 @ 13:49) >  Summary:   1. Left ventricular ejection fraction, by visual estimation, is 40 to   45%.   2. Mildly to moderately decreased global left ventricular systolic   function.   3. Multiple left ventricular regional wall motion abnormalities exist.   See wall motion findings.   4. Ischemic cardiomyopathy.   5. LA volume Index is 16.0 ml/m² ml/m2.    < end of copied text >  IMPRESSION:    Severe coronary artery calcifications (for example: series )    Great vessels normal in caliber.  Minimal calcification proximal aorta ().  Mild calcification aortic arch ()  Noncalcified ascending aorta.    Borderline dilatation, lower lobe and upper lobe bronchi.     No honeycombing, consolidation or effusions.    < end of copied text >  < from: Xray Chest 2 Views PA/Lat (19 @ 19:12) >  Impression:      No radiographic evidence of acute cardiopulmonary disease.    < end of copied text >    MEDICATIONS  (STANDING):  abacavir 600 mG/dolutegravir 50 mG/lamiVUDine 300 mG 1 Tablet(s) Oral daily  ALBUTerol/ipratropium for Nebulization 3 milliLiter(s) Nebulizer every 6 hours  ampicillin/sulbactam  IVPB 3 Gram(s) IV Intermittent every 6 hours  aspirin enteric coated 81 milliGRAM(s) Oral daily  atorvastatin 40 milliGRAM(s) Oral at bedtime  chlorhexidine 4% Liquid 1 Application(s) Topical <User Schedule>  docusate sodium 100 milliGRAM(s) Oral two times a day  guaiFENesin  milliGRAM(s) Oral every 12 hours  heparin  Injectable 5000 Unit(s) SubCutaneous every 8 hours  metoprolol tartrate 25 milliGRAM(s) Oral two times a day  pantoprazole    Tablet 40 milliGRAM(s) Oral before breakfast    MEDICATIONS  (PRN):  acetaminophen   Tablet .. 650 milliGRAM(s) Oral every 6 hours PRN Temp greater or equal to 38C (100.4F)  acetaminophen   Tablet .. 650 milliGRAM(s) Oral every 6 hours PRN Mild Pain (1 - 3)  benzocaine 15 mG/menthol 3.6 mG Lozenge 1 Lozenge Oral once PRN Sore Throat  oxyCODONE    5 mG/acetaminophen 325 mG 2 Tablet(s) Oral every 4 hours PRN Severe Pain (7 - 10)  traMADol 50 milliGRAM(s) Oral three times a day PRN Moderate Pain (4 - 6)    Allergies    No Known Allergies    Intolerances    Risk Factors:  NSEMI  DM  HIV  HFrEF  Carotid disease    STSIsolated CAB CALCULATE   Risk of Mortality: 	0.636% 	  Renal Failure: 	0.928% 	  Permanent Stroke: 	1.283% 	  Prolonged Ventilation: 	5.776% 	  DSW Infection: 	0.214% 	  Reoperation: 	2.081% 	  Morbidity or Mortality: 	8.744% 	  Short Length of Stay: 	58.909% 	  Long Length of Stay: 	1.967	    Ambulation/Activity Status:    Assessment/Plan:  48y Male pre-op CABG  - left arm precautions possible radial artery  - consent obtained  - NPO after midnight       Social Service Disposition:  Home after surgery

## 2019-09-09 NOTE — CONSULT NOTE ADULT - CONSULT REASON
Abnormal EKG and elevated troponin
Coronary artery disease- preOP
coronary artery disease with NSTEMI
dental pain
left mandibular dental pain
left sided dental pain, general evaluation and clearance
lower left side tooth pain
dental pain, L neck swelling
left sided toothache/facial pain, preop for cabg next week

## 2019-09-09 NOTE — DIETITIAN INITIAL EVALUATION ADULT. - OTHER INFO
Pt presents to the ED for worsened left lower molar pain for 1 week. Recent EKG/Stress test wnl. Cardiac Cath with multivessel disease. Awaiting CABG.

## 2019-09-09 NOTE — PRE-ANESTHESIA EVALUATION ADULT - LAST CARDIAC ANGIOGRAM/IMAGING
LAD prox 99%, mid 100%. RCA Prox 1/3 100%. Circumflex distal circ 60% at origin of OM2, OM 1 severe atherosclerosis, OM2 90%

## 2019-09-09 NOTE — CONSULT NOTE ADULT - REASON FOR ADMISSION
Fevers and left-sided tooth/facial pain

## 2019-09-09 NOTE — DIETITIAN INITIAL EVALUATION ADULT. - FACTORS AFF FOOD INTAKE
difficulty chewing/Pt notes he had wisdom teeth removed few weeks ago and followed soft diet but some meals he would not eat d/t pain. Pt reports eating better now - documented po intake %. Pt's wife also brings in meals for him. LBM 9/7, no GI distress. Tolerating soft diet. NKFA.

## 2019-09-09 NOTE — PRE-ANESTHESIA EVALUATION ADULT - NSANTHOSAYNRD_GEN_A_CORE
No. KALYANI screening performed.  STOP BANG Legend: 0-2 = LOW Risk; 3-4 = INTERMEDIATE Risk; 5-8 = HIGH Risk

## 2019-09-10 ENCOUNTER — TRANSCRIPTION ENCOUNTER (OUTPATIENT)
Age: 49
End: 2019-09-10

## 2019-09-10 LAB
ALBUMIN SERPL ELPH-MCNC: 3.9 G/DL — SIGNIFICANT CHANGE UP (ref 3.5–5.2)
ALP SERPL-CCNC: 32 U/L — SIGNIFICANT CHANGE UP (ref 30–115)
ALT FLD-CCNC: 33 U/L — SIGNIFICANT CHANGE UP (ref 0–41)
ANION GAP SERPL CALC-SCNC: 13 MMOL/L — SIGNIFICANT CHANGE UP (ref 7–14)
ANISOCYTOSIS BLD QL: SIGNIFICANT CHANGE UP
APTT BLD: 26.7 SEC — LOW (ref 27–39.2)
AST SERPL-CCNC: 53 U/L — HIGH (ref 0–41)
BASOPHILS # BLD AUTO: 0 K/UL — SIGNIFICANT CHANGE UP (ref 0–0.2)
BASOPHILS NFR BLD AUTO: 0 % — SIGNIFICANT CHANGE UP (ref 0–1)
BILIRUB SERPL-MCNC: 0.6 MG/DL — SIGNIFICANT CHANGE UP (ref 0.2–1.2)
BUN SERPL-MCNC: 12 MG/DL — SIGNIFICANT CHANGE UP (ref 10–20)
CALCIUM SERPL-MCNC: 7.6 MG/DL — LOW (ref 8.5–10.1)
CHLORIDE SERPL-SCNC: 106 MMOL/L — SIGNIFICANT CHANGE UP (ref 98–110)
CO2 SERPL-SCNC: 23 MMOL/L — SIGNIFICANT CHANGE UP (ref 17–32)
CREAT SERPL-MCNC: 0.9 MG/DL — SIGNIFICANT CHANGE UP (ref 0.7–1.5)
EOSINOPHIL # BLD AUTO: 0.09 K/UL — SIGNIFICANT CHANGE UP (ref 0–0.7)
EOSINOPHIL NFR BLD AUTO: 0.8 % — SIGNIFICANT CHANGE UP (ref 0–8)
GAS PNL BLDA: SIGNIFICANT CHANGE UP
GAS PNL BLDA: SIGNIFICANT CHANGE UP
GIANT PLATELETS BLD QL SMEAR: PRESENT — SIGNIFICANT CHANGE UP
GLUCOSE BLDC GLUCOMTR-MCNC: 129 MG/DL — HIGH (ref 70–99)
GLUCOSE BLDC GLUCOMTR-MCNC: 136 MG/DL — HIGH (ref 70–99)
GLUCOSE BLDC GLUCOMTR-MCNC: 153 MG/DL — HIGH (ref 70–99)
GLUCOSE BLDC GLUCOMTR-MCNC: 177 MG/DL — HIGH (ref 70–99)
GLUCOSE BLDC GLUCOMTR-MCNC: 80 MG/DL — SIGNIFICANT CHANGE UP (ref 70–99)
GLUCOSE BLDC GLUCOMTR-MCNC: 82 MG/DL — SIGNIFICANT CHANGE UP (ref 70–99)
GLUCOSE SERPL-MCNC: 91 MG/DL — SIGNIFICANT CHANGE UP (ref 70–99)
HCT VFR BLD CALC: 30.8 % — LOW (ref 42–52)
HGB BLD-MCNC: 10.5 G/DL — LOW (ref 14–18)
INR BLD: 1.25 RATIO — SIGNIFICANT CHANGE UP (ref 0.65–1.3)
LYMPHOCYTES # BLD AUTO: 1.35 K/UL — SIGNIFICANT CHANGE UP (ref 1.2–3.4)
LYMPHOCYTES # BLD AUTO: 12.2 % — LOW (ref 20.5–51.1)
MAGNESIUM SERPL-MCNC: 3.2 MG/DL — CRITICAL HIGH (ref 1.8–2.4)
MANUAL SMEAR VERIFICATION: SIGNIFICANT CHANGE UP
MCHC RBC-ENTMCNC: 29.2 PG — SIGNIFICANT CHANGE UP (ref 27–31)
MCHC RBC-ENTMCNC: 34.1 G/DL — SIGNIFICANT CHANGE UP (ref 32–37)
MCV RBC AUTO: 85.6 FL — SIGNIFICANT CHANGE UP (ref 80–94)
METAMYELOCYTES # FLD: 0.9 % — HIGH (ref 0–0)
MICROCYTES BLD QL: SIGNIFICANT CHANGE UP
MONOCYTES # BLD AUTO: 0.57 K/UL — SIGNIFICANT CHANGE UP (ref 0.1–0.6)
MONOCYTES NFR BLD AUTO: 5.2 % — SIGNIFICANT CHANGE UP (ref 1.7–9.3)
MYELOCYTES NFR BLD: 0.9 % — HIGH (ref 0–0)
NEUTROPHILS # BLD AUTO: 8.84 K/UL — HIGH (ref 1.4–6.5)
NEUTROPHILS NFR BLD AUTO: 80 % — HIGH (ref 42.2–75.2)
PLAT MORPH BLD: NORMAL — SIGNIFICANT CHANGE UP
PLATELET # BLD AUTO: 141 K/UL — SIGNIFICANT CHANGE UP (ref 130–400)
PLATELET COUNT - ESTIMATE: NORMAL — SIGNIFICANT CHANGE UP
POLYCHROMASIA BLD QL SMEAR: SLIGHT — SIGNIFICANT CHANGE UP
POTASSIUM SERPL-MCNC: 3.9 MMOL/L — SIGNIFICANT CHANGE UP (ref 3.5–5)
POTASSIUM SERPL-SCNC: 3.9 MMOL/L — SIGNIFICANT CHANGE UP (ref 3.5–5)
PROT SERPL-MCNC: 5.4 G/DL — LOW (ref 6–8)
PROTHROM AB SERPL-ACNC: 14.4 SEC — HIGH (ref 9.95–12.87)
RBC # BLD: 3.6 M/UL — LOW (ref 4.7–6.1)
RBC # FLD: 12.7 % — SIGNIFICANT CHANGE UP (ref 11.5–14.5)
RBC BLD AUTO: ABNORMAL
SODIUM SERPL-SCNC: 142 MMOL/L — SIGNIFICANT CHANGE UP (ref 135–146)
WBC # BLD: 11.05 K/UL — HIGH (ref 4.8–10.8)
WBC # FLD AUTO: 11.05 K/UL — HIGH (ref 4.8–10.8)

## 2019-09-10 PROCEDURE — 35600 OPEN HRV UXTR ART 1 SGM CAB: CPT | Mod: LT

## 2019-09-10 PROCEDURE — 35600 OPEN HRV UXTR ART 1 SGM CAB: CPT | Mod: AS,LT

## 2019-09-10 PROCEDURE — 33518 CABG ARTERY-VEIN TWO: CPT

## 2019-09-10 PROCEDURE — 71045 X-RAY EXAM CHEST 1 VIEW: CPT | Mod: 26

## 2019-09-10 PROCEDURE — 93010 ELECTROCARDIOGRAM REPORT: CPT

## 2019-09-10 PROCEDURE — 33508 ENDOSCOPIC VEIN HARVEST: CPT

## 2019-09-10 PROCEDURE — 33518 CABG ARTERY-VEIN TWO: CPT | Mod: AS

## 2019-09-10 PROCEDURE — 33508 ENDOSCOPIC VEIN HARVEST: CPT | Mod: AS

## 2019-09-10 PROCEDURE — 33534 CABG ARTERIAL TWO: CPT

## 2019-09-10 PROCEDURE — 33534 CABG ARTERIAL TWO: CPT | Mod: AS

## 2019-09-10 PROCEDURE — 36620 INSERTION CATHETER ARTERY: CPT

## 2019-09-10 RX ORDER — AMIODARONE HYDROCHLORIDE 400 MG/1
0.01 TABLET ORAL
Qty: 900 | Refills: 0 | Status: DISCONTINUED | OUTPATIENT
Start: 2019-09-10 | End: 2019-09-11

## 2019-09-10 RX ORDER — DEXMEDETOMIDINE HYDROCHLORIDE IN 0.9% SODIUM CHLORIDE 4 UG/ML
0.25 INJECTION INTRAVENOUS
Qty: 200 | Refills: 0 | Status: DISCONTINUED | OUTPATIENT
Start: 2019-09-10 | End: 2019-09-10

## 2019-09-10 RX ORDER — CHLORHEXIDINE GLUCONATE 213 G/1000ML
15 SOLUTION TOPICAL EVERY 12 HOURS
Refills: 0 | Status: DISCONTINUED | OUTPATIENT
Start: 2019-09-10 | End: 2019-09-10

## 2019-09-10 RX ORDER — DEXMEDETOMIDINE HYDROCHLORIDE IN 0.9% SODIUM CHLORIDE 4 UG/ML
0.25 INJECTION INTRAVENOUS
Qty: 200 | Refills: 0 | Status: DISCONTINUED | OUTPATIENT
Start: 2019-09-10 | End: 2019-09-11

## 2019-09-10 RX ORDER — INSULIN HUMAN 100 [IU]/ML
1 INJECTION, SOLUTION SUBCUTANEOUS
Qty: 50 | Refills: 0 | Status: DISCONTINUED | OUTPATIENT
Start: 2019-09-10 | End: 2019-09-10

## 2019-09-10 RX ORDER — NOREPINEPHRINE BITARTRATE/D5W 8 MG/250ML
0.05 PLASTIC BAG, INJECTION (ML) INTRAVENOUS
Qty: 8 | Refills: 0 | Status: DISCONTINUED | OUTPATIENT
Start: 2019-09-10 | End: 2019-09-10

## 2019-09-10 RX ORDER — IPRATROPIUM BROMIDE 0.2 MG/ML
2 SOLUTION, NON-ORAL INHALATION EVERY 6 HOURS
Refills: 0 | Status: DISCONTINUED | OUTPATIENT
Start: 2019-09-10 | End: 2019-09-10

## 2019-09-10 RX ORDER — MEPERIDINE HYDROCHLORIDE 50 MG/ML
25 INJECTION INTRAMUSCULAR; INTRAVENOUS; SUBCUTANEOUS ONCE
Refills: 0 | Status: COMPLETED | OUTPATIENT
Start: 2019-09-10

## 2019-09-10 RX ORDER — ALBUTEROL 90 UG/1
2 AEROSOL, METERED ORAL EVERY 6 HOURS
Refills: 0 | Status: DISCONTINUED | OUTPATIENT
Start: 2019-09-10 | End: 2019-09-11

## 2019-09-10 RX ORDER — DEXTROSE 50 % IN WATER 50 %
50 SYRINGE (ML) INTRAVENOUS
Refills: 0 | Status: DISCONTINUED | OUTPATIENT
Start: 2019-09-10 | End: 2019-09-17

## 2019-09-10 RX ORDER — SODIUM CHLORIDE 9 MG/ML
1000 INJECTION INTRAMUSCULAR; INTRAVENOUS; SUBCUTANEOUS
Refills: 0 | Status: DISCONTINUED | OUTPATIENT
Start: 2019-09-10 | End: 2019-09-10

## 2019-09-10 RX ORDER — MAGNESIUM SULFATE 500 MG/ML
1 VIAL (ML) INJECTION EVERY 12 HOURS
Refills: 0 | Status: DISCONTINUED | OUTPATIENT
Start: 2019-09-10 | End: 2019-09-17

## 2019-09-10 RX ORDER — INSULIN HUMAN 100 [IU]/ML
1 INJECTION, SOLUTION SUBCUTANEOUS
Qty: 50 | Refills: 0 | Status: DISCONTINUED | OUTPATIENT
Start: 2019-09-10 | End: 2019-09-13

## 2019-09-10 RX ORDER — POLYETHYLENE GLYCOL 3350 17 G/17G
17 POWDER, FOR SOLUTION ORAL DAILY
Refills: 0 | Status: DISCONTINUED | OUTPATIENT
Start: 2019-09-10 | End: 2019-09-10

## 2019-09-10 RX ORDER — PROPOFOL 10 MG/ML
10 INJECTION, EMULSION INTRAVENOUS
Qty: 1000 | Refills: 0 | Status: DISCONTINUED | OUTPATIENT
Start: 2019-09-10 | End: 2019-09-11

## 2019-09-10 RX ORDER — CHLORHEXIDINE GLUCONATE 213 G/1000ML
15 SOLUTION TOPICAL EVERY 12 HOURS
Refills: 0 | Status: DISCONTINUED | OUTPATIENT
Start: 2019-09-10 | End: 2019-09-12

## 2019-09-10 RX ORDER — SODIUM CHLORIDE 9 MG/ML
1000 INJECTION INTRAMUSCULAR; INTRAVENOUS; SUBCUTANEOUS
Refills: 0 | Status: DISCONTINUED | OUTPATIENT
Start: 2019-09-10 | End: 2019-09-17

## 2019-09-10 RX ORDER — KETOROLAC TROMETHAMINE 30 MG/ML
30 SYRINGE (ML) INJECTION EVERY 6 HOURS
Refills: 0 | Status: DISCONTINUED | OUTPATIENT
Start: 2019-09-10 | End: 2019-09-11

## 2019-09-10 RX ORDER — OXYCODONE HYDROCHLORIDE 5 MG/1
5 TABLET ORAL EVERY 4 HOURS
Refills: 0 | Status: DISCONTINUED | OUTPATIENT
Start: 2019-09-10 | End: 2019-09-10

## 2019-09-10 RX ORDER — FAMOTIDINE 10 MG/ML
20 INJECTION INTRAVENOUS EVERY 12 HOURS
Refills: 0 | Status: DISCONTINUED | OUTPATIENT
Start: 2019-09-10 | End: 2019-09-11

## 2019-09-10 RX ORDER — VASOPRESSIN 20 [USP'U]/ML
0.04 INJECTION INTRAVENOUS
Qty: 50 | Refills: 0 | Status: DISCONTINUED | OUTPATIENT
Start: 2019-09-10 | End: 2019-09-10

## 2019-09-10 RX ORDER — FENTANYL CITRATE 50 UG/ML
25 INJECTION INTRAVENOUS ONCE
Refills: 0 | Status: DISCONTINUED | OUTPATIENT
Start: 2019-09-10 | End: 2019-09-10

## 2019-09-10 RX ORDER — CEFAZOLIN SODIUM 1 G
1000 VIAL (EA) INJECTION EVERY 8 HOURS
Refills: 0 | Status: COMPLETED | OUTPATIENT
Start: 2019-09-10 | End: 2019-09-11

## 2019-09-10 RX ORDER — DOCUSATE SODIUM 100 MG
100 CAPSULE ORAL THREE TIMES A DAY
Refills: 0 | Status: DISCONTINUED | OUTPATIENT
Start: 2019-09-10 | End: 2019-09-17

## 2019-09-10 RX ORDER — OXYCODONE HYDROCHLORIDE 5 MG/1
10 TABLET ORAL EVERY 4 HOURS
Refills: 0 | Status: DISCONTINUED | OUTPATIENT
Start: 2019-09-10 | End: 2019-09-16

## 2019-09-10 RX ORDER — ASPIRIN/CALCIUM CARB/MAGNESIUM 324 MG
300 TABLET ORAL ONCE
Refills: 0 | Status: DISCONTINUED | OUTPATIENT
Start: 2019-09-10 | End: 2019-09-11

## 2019-09-10 RX ORDER — ASPIRIN/CALCIUM CARB/MAGNESIUM 324 MG
300 TABLET ORAL ONCE
Refills: 0 | Status: DISCONTINUED | OUTPATIENT
Start: 2019-09-10 | End: 2019-09-10

## 2019-09-10 RX ORDER — ONDANSETRON 8 MG/1
4 TABLET, FILM COATED ORAL ONCE
Refills: 0 | Status: DISCONTINUED | OUTPATIENT
Start: 2019-09-10 | End: 2019-09-10

## 2019-09-10 RX ORDER — NITROGLYCERIN 6.5 MG
5 CAPSULE, EXTENDED RELEASE ORAL
Qty: 50 | Refills: 0 | Status: DISCONTINUED | OUTPATIENT
Start: 2019-09-10 | End: 2019-09-10

## 2019-09-10 RX ORDER — ONDANSETRON 8 MG/1
4 TABLET, FILM COATED ORAL ONCE
Refills: 0 | Status: COMPLETED | OUTPATIENT
Start: 2019-09-10 | End: 2019-09-11

## 2019-09-10 RX ORDER — ALBUMIN HUMAN 25 %
2000 VIAL (ML) INTRAVENOUS ONCE
Refills: 0 | Status: COMPLETED | OUTPATIENT
Start: 2019-09-10 | End: 2019-09-10

## 2019-09-10 RX ORDER — MEPERIDINE HYDROCHLORIDE 50 MG/ML
25 INJECTION INTRAMUSCULAR; INTRAVENOUS; SUBCUTANEOUS ONCE
Refills: 0 | Status: DISCONTINUED | OUTPATIENT
Start: 2019-09-10 | End: 2019-09-10

## 2019-09-10 RX ORDER — ALBUMIN HUMAN 25 %
1000 VIAL (ML) INTRAVENOUS ONCE
Refills: 0 | Status: COMPLETED | OUTPATIENT
Start: 2019-09-10 | End: 2019-09-10

## 2019-09-10 RX ORDER — POLYETHYLENE GLYCOL 3350 17 G/17G
17 POWDER, FOR SOLUTION ORAL DAILY
Refills: 0 | Status: DISCONTINUED | OUTPATIENT
Start: 2019-09-10 | End: 2019-09-17

## 2019-09-10 RX ORDER — VASOPRESSIN 20 [USP'U]/ML
0.04 INJECTION INTRAVENOUS
Qty: 50 | Refills: 0 | Status: DISCONTINUED | OUTPATIENT
Start: 2019-09-10 | End: 2019-09-11

## 2019-09-10 RX ORDER — CEFAZOLIN SODIUM 1 G
1000 VIAL (EA) INJECTION EVERY 8 HOURS
Refills: 0 | Status: DISCONTINUED | OUTPATIENT
Start: 2019-09-10 | End: 2019-09-10

## 2019-09-10 RX ORDER — IPRATROPIUM BROMIDE 0.2 MG/ML
2 SOLUTION, NON-ORAL INHALATION EVERY 6 HOURS
Refills: 0 | Status: DISCONTINUED | OUTPATIENT
Start: 2019-09-10 | End: 2019-09-17

## 2019-09-10 RX ORDER — KETOROLAC TROMETHAMINE 30 MG/ML
30 SYRINGE (ML) INJECTION ONCE
Refills: 0 | Status: DISCONTINUED | OUTPATIENT
Start: 2019-09-10 | End: 2019-09-10

## 2019-09-10 RX ORDER — POTASSIUM CHLORIDE 20 MEQ
20 PACKET (EA) ORAL ONCE
Refills: 0 | Status: COMPLETED | OUTPATIENT
Start: 2019-09-10 | End: 2019-09-10

## 2019-09-10 RX ORDER — NITROGLYCERIN 6.5 MG
5 CAPSULE, EXTENDED RELEASE ORAL
Qty: 50 | Refills: 0 | Status: DISCONTINUED | OUTPATIENT
Start: 2019-09-10 | End: 2019-09-13

## 2019-09-10 RX ORDER — PROPOFOL 10 MG/ML
10 INJECTION, EMULSION INTRAVENOUS
Qty: 1000 | Refills: 0 | Status: DISCONTINUED | OUTPATIENT
Start: 2019-09-10 | End: 2019-09-10

## 2019-09-10 RX ORDER — NOREPINEPHRINE BITARTRATE/D5W 8 MG/250ML
0.05 PLASTIC BAG, INJECTION (ML) INTRAVENOUS
Qty: 8 | Refills: 0 | Status: DISCONTINUED | OUTPATIENT
Start: 2019-09-10 | End: 2019-09-11

## 2019-09-10 RX ORDER — NICARDIPINE HYDROCHLORIDE 30 MG/1
5 CAPSULE, EXTENDED RELEASE ORAL
Qty: 40 | Refills: 0 | Status: DISCONTINUED | OUTPATIENT
Start: 2019-09-10 | End: 2019-09-11

## 2019-09-10 RX ORDER — NICARDIPINE HYDROCHLORIDE 30 MG/1
5 CAPSULE, EXTENDED RELEASE ORAL
Qty: 40 | Refills: 0 | Status: DISCONTINUED | OUTPATIENT
Start: 2019-09-10 | End: 2019-09-10

## 2019-09-10 RX ADMIN — FENTANYL CITRATE 25 MICROGRAM(S): 50 INJECTION INTRAVENOUS at 22:30

## 2019-09-10 RX ADMIN — Medication 100 MILLIEQUIVALENT(S): at 17:47

## 2019-09-10 RX ADMIN — Medication 1000 MILLILITER(S): at 17:54

## 2019-09-10 RX ADMIN — MEPERIDINE HYDROCHLORIDE 25 MILLIGRAM(S): 50 INJECTION INTRAMUSCULAR; INTRAVENOUS; SUBCUTANEOUS at 19:30

## 2019-09-10 RX ADMIN — MEPERIDINE HYDROCHLORIDE 25 MILLIGRAM(S): 50 INJECTION INTRAMUSCULAR; INTRAVENOUS; SUBCUTANEOUS at 18:51

## 2019-09-10 RX ADMIN — Medication 500 MILLILITER(S): at 19:13

## 2019-09-10 RX ADMIN — Medication 30 MILLIGRAM(S): at 21:00

## 2019-09-10 RX ADMIN — Medication 30 MILLIGRAM(S): at 18:22

## 2019-09-10 RX ADMIN — Medication 100 MILLIGRAM(S): at 17:54

## 2019-09-10 RX ADMIN — CHLORHEXIDINE GLUCONATE 15 MILLILITER(S): 213 SOLUTION TOPICAL at 06:29

## 2019-09-10 RX ADMIN — Medication 30 MILLIGRAM(S): at 17:52

## 2019-09-10 RX ADMIN — Medication 30 MILLIGRAM(S): at 21:30

## 2019-09-10 RX ADMIN — FAMOTIDINE 20 MILLIGRAM(S): 10 INJECTION INTRAVENOUS at 17:52

## 2019-09-10 RX ADMIN — AMPICILLIN SODIUM AND SULBACTAM SODIUM 200 GRAM(S): 250; 125 INJECTION, POWDER, FOR SUSPENSION INTRAMUSCULAR; INTRAVENOUS at 06:30

## 2019-09-10 RX ADMIN — CHLORHEXIDINE GLUCONATE 15 MILLILITER(S): 213 SOLUTION TOPICAL at 17:52

## 2019-09-10 RX ADMIN — FENTANYL CITRATE 25 MICROGRAM(S): 50 INJECTION INTRAVENOUS at 23:00

## 2019-09-10 NOTE — PRE-OP CHECKLIST - SELECT TESTS ORDERED
CMP/CXR/MRSA SWAB, HIV TEST/Hepatic Function/EKG/BMP/CBC/PT/PTT/INR/Type and Cross/POCT Blood Glucose/Type and Screen/Urinalysis

## 2019-09-10 NOTE — DISCHARGE NOTE PROVIDER - NSDCCPCAREPLAN_GEN_ALL_CORE_FT
PRINCIPAL DISCHARGE DIAGNOSIS  Diagnosis: Coronary artery disease  Assessment and Plan of Treatment:       SECONDARY DISCHARGE DIAGNOSES  Diagnosis: Toothache  Assessment and Plan of Treatment:     Diagnosis: Neck swelling  Assessment and Plan of Treatment:

## 2019-09-10 NOTE — DISCHARGE NOTE PROVIDER - NSDCACTIVITY_GEN_ALL_CORE
Walking - Outdoors allowed/Do not drive or operate machinery/Stairs allowed/Showering allowed/Walking - Indoors allowed/No heavy lifting/straining

## 2019-09-10 NOTE — BRIEF OPERATIVE NOTE - NSICDXBRIEFPROCEDURE_GEN_ALL_CORE_FT
PROCEDURES:  CABG, with radial artery graft 10-Sep-2019 06:56:49  Addie Connor PROCEDURES:  CABG, with radial artery graft 10-Sep-2019 06:56:49 LIMA to LAD, RSVG to OM 1 and OM2 and Left Radial Artery to PDA. Addie Connor

## 2019-09-10 NOTE — DISCHARGE NOTE PROVIDER - NSDCFUADDAPPT_GEN_ALL_CORE_FT
Please follow up with Dr. Roldan on 9/20/2019 @ 1:30pm  Thoracic and Cardiac Surgery  [address] [place] [address], [place] [address]  Phone: [phone]

## 2019-09-10 NOTE — DISCHARGE NOTE PROVIDER - PROVIDER TOKENS
PROVIDER:[TOKEN:[[id number]],FOLLOWUP:[1-3 days]],PROVIDER:[TOKEN:[[id number]],FOLLOWUP:[2 weeks]]

## 2019-09-10 NOTE — DISCHARGE NOTE PROVIDER - HOSPITAL COURSE
47 yo M with HIV on ART, DM (HgA1C 6.3), DLD, recent cavity filling 8/26, presents to the ED for worsened left lower molar pain for 1 week, found to have ST elevations V2-3 and elevated CE, fever. Of note did not c/o chest pain at any point. Recent EKG/Stress test wnl. Cardiac Cath with multivessel disease.      Patient underwent CABG x 4 (LIMA- LAD, SVG- OM1, SVG- OM2, radial- PDA) on 9/10/19. Patient tolerated procedure well, and had no issues coming off bypass. CPB time 159 min, CXL time 127 min. Post op Ef improved to 35% from 25% preop.      Postop course.......... 47 yo M with HIV on ART, DM (HgA1C 6.3), DLD, recent cavity filling 8/26, presents to the ED for worsened left lower molar pain for 1 week, found to have ST elevations V2-3 and elevated CE, fever. Of note did not c/o chest pain at any point. Recent EKG/Stress test wnl. Cardiac Cath with multivessel disease.      Patient underwent CABG x 4 (LIMA- LAD, SVG- OM1, SVG- OM2, radial- PDA) on 9/10/19. Patient tolerated procedure well, and had no issues coming off bypass. CPB time 159 min, CXL time 127 min. Post op Ef improved to 35% from 25% preop.  Post-operatively, patient required diuresis for fluid overload. Patient was started on metoprolol for heart rate control, however due to borderline bp he was not able to tolerated isordil as well. Patient was not started on statin due to elevated lfts. Pt was discharged home in stable condition on POD#7 with instructions to follow up with Dr. Roldan on 9/20/2019 @ 1:30pm.

## 2019-09-10 NOTE — DISCHARGE NOTE PROVIDER - CARE PROVIDER_API CALL
Dionisio Roldan)  Thoracic and Cardiac Surgery  [address] [place] [address], [address]  [place] [address]  Phone: [phone]  Fax: [phone]  Follow Up Time: 1-3 days    Remington Griffin)  Cardiovascular Disease; Nuclear Cardiology  [address] [facility], [address]  [place] [address]  Phone: [phone]  Fax: [phone]  Follow Up Time: 2 weeks

## 2019-09-10 NOTE — DISCHARGE NOTE PROVIDER - NSDCCPTREATMENT_GEN_ALL_CORE_FT
PRINCIPAL PROCEDURE  Procedure: CABG, with radial artery graft  Findings and Treatment: LIMA to LAD, RSVG to OM 1 and OM2 and Left Radial Artery to PDA.

## 2019-09-10 NOTE — DISCHARGE NOTE PROVIDER - NSDCHHNEEDSERVICE_GEN_ALL_CORE
Teaching and training/Wound care and assessment/Observation and assessment/Medication teaching and assessment

## 2019-09-10 NOTE — BRIEF OPERATIVE NOTE - NSICDXBRIEFPREOP_GEN_ALL_CORE_FT
PRE-OP DIAGNOSIS:  CAD in native artery 10-Sep-2019 06:57:07  Addie Connor  CAD in native artery 10-Sep-2019 06:56:59  Addie Connor

## 2019-09-11 LAB
ALBUMIN SERPL ELPH-MCNC: 4.3 G/DL — SIGNIFICANT CHANGE UP (ref 3.5–5.2)
ALBUMIN SERPL ELPH-MCNC: 4.7 G/DL — SIGNIFICANT CHANGE UP (ref 3.5–5.2)
ALP SERPL-CCNC: 25 U/L — LOW (ref 30–115)
ALP SERPL-CCNC: 29 U/L — LOW (ref 30–115)
ALT FLD-CCNC: 333 U/L — HIGH (ref 0–41)
ALT FLD-CCNC: 430 U/L — HIGH (ref 0–41)
AMYLASE P1 CFR SERPL: 289 U/L — HIGH (ref 25–115)
ANION GAP SERPL CALC-SCNC: 10 MMOL/L — SIGNIFICANT CHANGE UP (ref 7–14)
ANION GAP SERPL CALC-SCNC: 12 MMOL/L — SIGNIFICANT CHANGE UP (ref 7–14)
APTT BLD: 30.8 SEC — SIGNIFICANT CHANGE UP (ref 27–39.2)
AST SERPL-CCNC: 299 U/L — HIGH (ref 0–41)
AST SERPL-CCNC: 312 U/L — HIGH (ref 0–41)
BASE EXCESS BLDA CALC-SCNC: -2.4 MMOL/L — LOW (ref -2–2)
BASE EXCESS BLDV CALC-SCNC: -1.2 MMOL/L — SIGNIFICANT CHANGE UP (ref -2–2)
BILIRUB SERPL-MCNC: 0.7 MG/DL — SIGNIFICANT CHANGE UP (ref 0.2–1.2)
BILIRUB SERPL-MCNC: 0.8 MG/DL — SIGNIFICANT CHANGE UP (ref 0.2–1.2)
BUN SERPL-MCNC: 14 MG/DL — SIGNIFICANT CHANGE UP (ref 10–20)
BUN SERPL-MCNC: 16 MG/DL — SIGNIFICANT CHANGE UP (ref 10–20)
CA-I SERPL-SCNC: 1.08 MMOL/L — LOW (ref 1.12–1.3)
CALCIUM SERPL-MCNC: 8 MG/DL — LOW (ref 8.5–10.1)
CALCIUM SERPL-MCNC: 8.1 MG/DL — LOW (ref 8.5–10.1)
CHLORIDE SERPL-SCNC: 103 MMOL/L — SIGNIFICANT CHANGE UP (ref 98–110)
CHLORIDE SERPL-SCNC: 105 MMOL/L — SIGNIFICANT CHANGE UP (ref 98–110)
CO2 SERPL-SCNC: 20 MMOL/L — SIGNIFICANT CHANGE UP (ref 17–32)
CO2 SERPL-SCNC: 22 MMOL/L — SIGNIFICANT CHANGE UP (ref 17–32)
CREAT SERPL-MCNC: 0.8 MG/DL — SIGNIFICANT CHANGE UP (ref 0.7–1.5)
CREAT SERPL-MCNC: 0.9 MG/DL — SIGNIFICANT CHANGE UP (ref 0.7–1.5)
GAS PNL BLDA: SIGNIFICANT CHANGE UP
GAS PNL BLDV: 137 MMOL/L — SIGNIFICANT CHANGE UP (ref 136–145)
GAS PNL BLDV: SIGNIFICANT CHANGE UP
GAS PNL BLDV: SIGNIFICANT CHANGE UP
GLUCOSE BLDC GLUCOMTR-MCNC: 102 MG/DL — HIGH (ref 70–99)
GLUCOSE BLDC GLUCOMTR-MCNC: 113 MG/DL — HIGH (ref 70–99)
GLUCOSE BLDC GLUCOMTR-MCNC: 113 MG/DL — HIGH (ref 70–99)
GLUCOSE BLDC GLUCOMTR-MCNC: 115 MG/DL — HIGH (ref 70–99)
GLUCOSE BLDC GLUCOMTR-MCNC: 141 MG/DL — HIGH (ref 70–99)
GLUCOSE BLDC GLUCOMTR-MCNC: 144 MG/DL — HIGH (ref 70–99)
GLUCOSE BLDC GLUCOMTR-MCNC: 147 MG/DL — HIGH (ref 70–99)
GLUCOSE BLDC GLUCOMTR-MCNC: 157 MG/DL — HIGH (ref 70–99)
GLUCOSE BLDC GLUCOMTR-MCNC: 168 MG/DL — HIGH (ref 70–99)
GLUCOSE BLDC GLUCOMTR-MCNC: 202 MG/DL — HIGH (ref 70–99)
GLUCOSE BLDC GLUCOMTR-MCNC: 203 MG/DL — HIGH (ref 70–99)
GLUCOSE BLDC GLUCOMTR-MCNC: 94 MG/DL — SIGNIFICANT CHANGE UP (ref 70–99)
GLUCOSE SERPL-MCNC: 111 MG/DL — HIGH (ref 70–99)
GLUCOSE SERPL-MCNC: 143 MG/DL — HIGH (ref 70–99)
HCO3 BLDA-SCNC: 21 MMOL/L — LOW (ref 23–27)
HCO3 BLDV-SCNC: 24 MMOL/L — SIGNIFICANT CHANGE UP (ref 22–29)
HCT VFR BLD CALC: 22.5 % — LOW (ref 42–52)
HCT VFR BLDA CALC: 23.7 % — LOW (ref 34–44)
HGB BLD CALC-MCNC: 7.7 G/DL — LOW (ref 14–18)
HGB BLD-MCNC: 7.7 G/DL — LOW (ref 14–18)
HOROWITZ INDEX BLDA+IHG-RTO: 80 — SIGNIFICANT CHANGE UP
INR BLD: 1.55 RATIO — HIGH (ref 0.65–1.3)
LACTATE BLDV-MCNC: 1.2 MMOL/L — SIGNIFICANT CHANGE UP (ref 0.5–1.6)
LIDOCAIN IGE QN: 8 U/L — SIGNIFICANT CHANGE UP (ref 7–60)
MCHC RBC-ENTMCNC: 29.3 PG — SIGNIFICANT CHANGE UP (ref 27–31)
MCHC RBC-ENTMCNC: 34.2 G/DL — SIGNIFICANT CHANGE UP (ref 32–37)
MCV RBC AUTO: 85.6 FL — SIGNIFICANT CHANGE UP (ref 80–94)
NRBC # BLD: 0 /100 WBCS — SIGNIFICANT CHANGE UP (ref 0–0)
PCO2 BLDA: 33 MMHG — LOW (ref 38–42)
PCO2 BLDV: 41 MMHG — SIGNIFICANT CHANGE UP (ref 41–51)
PH BLDA: 7.42 — SIGNIFICANT CHANGE UP (ref 7.38–7.42)
PH BLDV: 7.38 — SIGNIFICANT CHANGE UP (ref 7.26–7.43)
PLATELET # BLD AUTO: 95 K/UL — LOW (ref 130–400)
PO2 BLDA: 115 MMHG — HIGH (ref 78–95)
PO2 BLDV: 27 MMHG — SIGNIFICANT CHANGE UP (ref 20–40)
POTASSIUM BLDV-SCNC: 4.4 MMOL/L — SIGNIFICANT CHANGE UP (ref 3.3–5.6)
POTASSIUM SERPL-MCNC: 4.1 MMOL/L — SIGNIFICANT CHANGE UP (ref 3.5–5)
POTASSIUM SERPL-MCNC: 4.5 MMOL/L — SIGNIFICANT CHANGE UP (ref 3.5–5)
POTASSIUM SERPL-SCNC: 4.1 MMOL/L — SIGNIFICANT CHANGE UP (ref 3.5–5)
POTASSIUM SERPL-SCNC: 4.5 MMOL/L — SIGNIFICANT CHANGE UP (ref 3.5–5)
PROT SERPL-MCNC: 5.8 G/DL — LOW (ref 6–8)
PROT SERPL-MCNC: 6 G/DL — SIGNIFICANT CHANGE UP (ref 6–8)
PROTHROM AB SERPL-ACNC: 17.8 SEC — HIGH (ref 9.95–12.87)
RBC # BLD: 2.63 M/UL — LOW (ref 4.7–6.1)
RBC # FLD: 13.3 % — SIGNIFICANT CHANGE UP (ref 11.5–14.5)
SAO2 % BLDA: 97 % — SIGNIFICANT CHANGE UP (ref 94–98)
SAO2 % BLDV: 46 % — SIGNIFICANT CHANGE UP
SODIUM SERPL-SCNC: 135 MMOL/L — SIGNIFICANT CHANGE UP (ref 135–146)
SODIUM SERPL-SCNC: 137 MMOL/L — SIGNIFICANT CHANGE UP (ref 135–146)
WBC # BLD: 7.47 K/UL — SIGNIFICANT CHANGE UP (ref 4.8–10.8)
WBC # FLD AUTO: 7.47 K/UL — SIGNIFICANT CHANGE UP (ref 4.8–10.8)

## 2019-09-11 PROCEDURE — 71045 X-RAY EXAM CHEST 1 VIEW: CPT | Mod: 26,77

## 2019-09-11 PROCEDURE — 71045 X-RAY EXAM CHEST 1 VIEW: CPT | Mod: 26

## 2019-09-11 PROCEDURE — 99291 CRITICAL CARE FIRST HOUR: CPT

## 2019-09-11 RX ORDER — MILRINONE LACTATE 1 MG/ML
0.2 INJECTION, SOLUTION INTRAVENOUS
Qty: 20 | Refills: 0 | Status: DISCONTINUED | OUTPATIENT
Start: 2019-09-11 | End: 2019-09-13

## 2019-09-11 RX ORDER — HEPARIN SODIUM 5000 [USP'U]/ML
5000 INJECTION INTRAVENOUS; SUBCUTANEOUS EVERY 8 HOURS
Refills: 0 | Status: DISCONTINUED | OUTPATIENT
Start: 2019-09-11 | End: 2019-09-11

## 2019-09-11 RX ORDER — FENTANYL CITRATE 50 UG/ML
25 INJECTION INTRAVENOUS ONCE
Refills: 0 | Status: DISCONTINUED | OUTPATIENT
Start: 2019-09-11 | End: 2019-09-11

## 2019-09-11 RX ORDER — KETOROLAC TROMETHAMINE 30 MG/ML
30 SYRINGE (ML) INJECTION ONCE
Refills: 0 | Status: DISCONTINUED | OUTPATIENT
Start: 2019-09-11 | End: 2019-09-11

## 2019-09-11 RX ORDER — CARVEDILOL PHOSPHATE 80 MG/1
3.12 CAPSULE, EXTENDED RELEASE ORAL ONCE
Refills: 0 | Status: COMPLETED | OUTPATIENT
Start: 2019-09-11 | End: 2019-09-11

## 2019-09-11 RX ORDER — ASPIRIN/CALCIUM CARB/MAGNESIUM 324 MG
81 TABLET ORAL DAILY
Refills: 0 | Status: DISCONTINUED | OUTPATIENT
Start: 2019-09-11 | End: 2019-09-17

## 2019-09-11 RX ORDER — POTASSIUM CHLORIDE 20 MEQ
20 PACKET (EA) ORAL ONCE
Refills: 0 | Status: COMPLETED | OUTPATIENT
Start: 2019-09-11 | End: 2019-09-11

## 2019-09-11 RX ORDER — ISOSORBIDE DINITRATE 5 MG/1
10 TABLET ORAL THREE TIMES A DAY
Refills: 0 | Status: DISCONTINUED | OUTPATIENT
Start: 2019-09-11 | End: 2019-09-13

## 2019-09-11 RX ORDER — CLOPIDOGREL BISULFATE 75 MG/1
75 TABLET, FILM COATED ORAL DAILY
Refills: 0 | Status: DISCONTINUED | OUTPATIENT
Start: 2019-09-11 | End: 2019-09-12

## 2019-09-11 RX ORDER — ISOSORBIDE DINITRATE 5 MG/1
10 TABLET ORAL ONCE
Refills: 0 | Status: COMPLETED | OUTPATIENT
Start: 2019-09-11 | End: 2019-09-11

## 2019-09-11 RX ORDER — PANTOPRAZOLE SODIUM 20 MG/1
40 TABLET, DELAYED RELEASE ORAL
Refills: 0 | Status: DISCONTINUED | OUTPATIENT
Start: 2019-09-11 | End: 2019-09-17

## 2019-09-11 RX ORDER — CARVEDILOL PHOSPHATE 80 MG/1
3.12 CAPSULE, EXTENDED RELEASE ORAL EVERY 12 HOURS
Refills: 0 | Status: DISCONTINUED | OUTPATIENT
Start: 2019-09-11 | End: 2019-09-12

## 2019-09-11 RX ORDER — FUROSEMIDE 40 MG
40 TABLET ORAL ONCE
Refills: 0 | Status: COMPLETED | OUTPATIENT
Start: 2019-09-11 | End: 2019-09-12

## 2019-09-11 RX ORDER — HEPARIN SODIUM 5000 [USP'U]/ML
5000 INJECTION INTRAVENOUS; SUBCUTANEOUS EVERY 8 HOURS
Refills: 0 | Status: DISCONTINUED | OUTPATIENT
Start: 2019-09-11 | End: 2019-09-12

## 2019-09-11 RX ORDER — FENTANYL CITRATE 50 UG/ML
25 INJECTION INTRAVENOUS ONCE
Refills: 0 | Status: DISCONTINUED | OUTPATIENT
Start: 2019-09-11 | End: 2019-09-12

## 2019-09-11 RX ORDER — METOPROLOL TARTRATE 50 MG
5 TABLET ORAL ONCE
Refills: 0 | Status: COMPLETED | OUTPATIENT
Start: 2019-09-11 | End: 2019-09-11

## 2019-09-11 RX ORDER — FUROSEMIDE 40 MG
40 TABLET ORAL ONCE
Refills: 0 | Status: COMPLETED | OUTPATIENT
Start: 2019-09-11 | End: 2019-09-11

## 2019-09-11 RX ADMIN — CARVEDILOL PHOSPHATE 3.12 MILLIGRAM(S): 80 CAPSULE, EXTENDED RELEASE ORAL at 10:30

## 2019-09-11 RX ADMIN — OXYCODONE HYDROCHLORIDE 10 MILLIGRAM(S): 5 TABLET ORAL at 00:00

## 2019-09-11 RX ADMIN — OXYCODONE HYDROCHLORIDE 10 MILLIGRAM(S): 5 TABLET ORAL at 09:37

## 2019-09-11 RX ADMIN — FENTANYL CITRATE 25 MICROGRAM(S): 50 INJECTION INTRAVENOUS at 14:40

## 2019-09-11 RX ADMIN — Medication 600 MILLIGRAM(S): at 05:59

## 2019-09-11 RX ADMIN — FENTANYL CITRATE 25 MICROGRAM(S): 50 INJECTION INTRAVENOUS at 13:20

## 2019-09-11 RX ADMIN — CARVEDILOL PHOSPHATE 3.12 MILLIGRAM(S): 80 CAPSULE, EXTENDED RELEASE ORAL at 17:28

## 2019-09-11 RX ADMIN — Medication 5 MILLIGRAM(S): at 11:00

## 2019-09-11 RX ADMIN — FAMOTIDINE 20 MILLIGRAM(S): 10 INJECTION INTRAVENOUS at 05:57

## 2019-09-11 RX ADMIN — ONDANSETRON 104 MILLIGRAM(S): 8 TABLET, FILM COATED ORAL at 00:39

## 2019-09-11 RX ADMIN — Medication 100 MILLIGRAM(S): at 06:10

## 2019-09-11 RX ADMIN — Medication 100 GRAM(S): at 06:47

## 2019-09-11 RX ADMIN — Medication 81 MILLIGRAM(S): at 17:25

## 2019-09-11 RX ADMIN — OXYCODONE HYDROCHLORIDE 10 MILLIGRAM(S): 5 TABLET ORAL at 09:07

## 2019-09-11 RX ADMIN — Medication 30 MILLIGRAM(S): at 02:00

## 2019-09-11 RX ADMIN — Medication 100 MILLIGRAM(S): at 08:31

## 2019-09-11 RX ADMIN — ISOSORBIDE DINITRATE 10 MILLIGRAM(S): 5 TABLET ORAL at 10:30

## 2019-09-11 RX ADMIN — Medication 30 MILLIGRAM(S): at 02:30

## 2019-09-11 RX ADMIN — Medication 600 MILLIGRAM(S): at 17:28

## 2019-09-11 RX ADMIN — Medication 30 MILLIGRAM(S): at 06:00

## 2019-09-11 RX ADMIN — FENTANYL CITRATE 25 MICROGRAM(S): 50 INJECTION INTRAVENOUS at 13:05

## 2019-09-11 RX ADMIN — OXYCODONE HYDROCHLORIDE 10 MILLIGRAM(S): 5 TABLET ORAL at 05:00

## 2019-09-11 RX ADMIN — Medication 30 MILLIGRAM(S): at 06:30

## 2019-09-11 RX ADMIN — Medication 30 MILLIGRAM(S): at 20:30

## 2019-09-11 RX ADMIN — Medication 1.5 MICROGRAM(S)/MIN: at 19:19

## 2019-09-11 RX ADMIN — Medication 40 MILLIGRAM(S): at 11:34

## 2019-09-11 RX ADMIN — Medication 100 GRAM(S): at 17:41

## 2019-09-11 RX ADMIN — OXYCODONE HYDROCHLORIDE 10 MILLIGRAM(S): 5 TABLET ORAL at 05:30

## 2019-09-11 RX ADMIN — Medication 100 MILLIGRAM(S): at 00:40

## 2019-09-11 RX ADMIN — Medication 30 MILLIGRAM(S): at 20:15

## 2019-09-11 RX ADMIN — ISOSORBIDE DINITRATE 10 MILLIGRAM(S): 5 TABLET ORAL at 21:36

## 2019-09-11 RX ADMIN — CHLORHEXIDINE GLUCONATE 15 MILLILITER(S): 213 SOLUTION TOPICAL at 17:28

## 2019-09-11 RX ADMIN — HEPARIN SODIUM 5000 UNIT(S): 5000 INJECTION INTRAVENOUS; SUBCUTANEOUS at 21:36

## 2019-09-11 RX ADMIN — OXYCODONE HYDROCHLORIDE 10 MILLIGRAM(S): 5 TABLET ORAL at 18:15

## 2019-09-11 RX ADMIN — Medication 30 MILLIGRAM(S): at 11:49

## 2019-09-11 RX ADMIN — Medication 100 MILLIGRAM(S): at 21:36

## 2019-09-11 RX ADMIN — CLOPIDOGREL BISULFATE 75 MILLIGRAM(S): 75 TABLET, FILM COATED ORAL at 18:14

## 2019-09-11 RX ADMIN — OXYCODONE HYDROCHLORIDE 10 MILLIGRAM(S): 5 TABLET ORAL at 14:17

## 2019-09-11 RX ADMIN — OXYCODONE HYDROCHLORIDE 10 MILLIGRAM(S): 5 TABLET ORAL at 00:30

## 2019-09-11 RX ADMIN — PANTOPRAZOLE SODIUM 40 MILLIGRAM(S): 20 TABLET, DELAYED RELEASE ORAL at 09:44

## 2019-09-11 RX ADMIN — Medication 100 MILLIEQUIVALENT(S): at 12:53

## 2019-09-11 RX ADMIN — Medication 30 MILLIGRAM(S): at 11:34

## 2019-09-11 RX ADMIN — INSULIN HUMAN 1 UNIT(S)/HR: 100 INJECTION, SOLUTION SUBCUTANEOUS at 17:25

## 2019-09-11 RX ADMIN — ISOSORBIDE DINITRATE 10 MILLIGRAM(S): 5 TABLET ORAL at 14:09

## 2019-09-11 NOTE — PROGRESS NOTE ADULT - SUBJECTIVE AND OBJECTIVE BOX
BRISEIDA SIMON  48y, Male  Allergy: No Known Allergies      CHIEF COMPLAINT: Fevers and left-sided tooth/facial pain (11 Sep 2019 11:10)      INTERVAL EVENTS/HPI  - T(F): , Max: 101.7 (09-10-19 @ 20:00)  - Denies any worsening symptoms  - Tolerating medication  - WBC Count: 6.64 K/uL (09-11-19 @ 00:00)      ROS  General: Denies rigors, nightsweats  HEENT: Denies headache, rhinorrhea, sore throat, eye pain  CV: Denies CP, palpitations  PULM: Denies SOB, wheezing  GI: Denies hematemesis, hematochezia, melena  : Denies discharge, hematuria  MSK: Denies arthralgias, myalgias  SKIN: Denies rash, lesions  NEURO: Denies paresthesias, weakness  PSYCH: Denies depression, anxiety    VITALS:  T(F): 99.1, Max: 101.7 (09-10-19 @ 20:00)  HR: 86  BP: 133/78  RR: 12Vital Signs Last 24 Hrs  T(C): 37.3 (11 Sep 2019 12:00), Max: 38.7 (10 Sep 2019 20:00)  T(F): 99.1 (11 Sep 2019 12:00), Max: 101.7 (10 Sep 2019 20:00)  HR: 86 (11 Sep 2019 12:00) (79 - 115)  BP: 133/78 (11 Sep 2019 10:45) (103/58 - 133/78)  BP(mean): 100 (11 Sep 2019 10:45) (73 - 100)  RR: 12 (11 Sep 2019 12:00) (7 - 37)  SpO2: 100% (11 Sep 2019 12:00) (80% - 100%)    PHYSICAL EXAM:  Gen: NAD, resting in bed on BIPAP  HEENT: Normocephalic, atraumatic  Neck: supple, no lymphadenopathy  CV: Regular rate & regular rhythm, midline incision cdi  Lungs: decreased BS at bases, no fremitus  Abdomen: Soft, BS present  Ext: Warm, well perfused  Neuro: non focal, awake  Skin: no rash, no erythema  Lines: no phlebitis    FH: Non-contributory  Social Hx: Non-contributory    TESTS & MEASUREMENTS:                        8.4    6.64  )-----------( 70       ( 11 Sep 2019 00:00 )             24.7     09-11    138  |  106  |  15  ----------------------------<  101<H>  4.9   |  19  |  0.9    Ca    8.0<L>      11 Sep 2019 00:00  Mg     2.4     09-11    TPro  6.3  /  Alb  5.1  /  TBili  1.1  /  DBili  0.3<H>  /  AST  208<H>  /  ALT  248<H>  /  AlkPhos  23<L>  09-11    eGFR if Non African American: 101 mL/min/1.73M2 (09-11-19 @ 00:00)  eGFR if : 117 mL/min/1.73M2 (09-11-19 @ 00:00)  eGFR if Non African American: 101 mL/min/1.73M2 (09-10-19 @ 15:00)  eGFR if : 117 mL/min/1.73M2 (09-10-19 @ 15:00)    LIVER FUNCTIONS - ( 11 Sep 2019 00:00 )  Alb: 5.1 g/dL / Pro: 6.3 g/dL / ALK PHOS: 23 U/L / ALT: 248 U/L / AST: 208 U/L / GGT: x                 Blood Gas Venous - Lactate: 1.0 mmoL/L (09-11-19 @ 02:34)      INFECTIOUS DISEASES TESTING  MRSA PCR Result.: Negative (09-08-19 @ 17:59)  HIV-1/2 Combo Result: Reactive (09-02-19 @ 05:44)      RADIOLOGY & ADDITIONAL TESTS:  I have personally reviewed the last Chest xray  CXR      CT      CARDIOLOGY TESTING  12 Lead ECG:   Ventricular Rate 98 BPM    Atrial Rate 98 BPM    P-R Interval 190 ms    QRS Duration 108 ms    Q-T Interval 386 ms    QTC Calculation(Bezet) 492 ms    P Axis -1 degrees    R Axis 77 degrees    T Axis -28 degrees    Diagnosis Line Normal sinus rhythm  Anteroseptal infarct , possibly acute  T wave abnormality, consider inferior ischemia  *** ** ** ** * ACUTE MI  ** ** ** **  Abnormal ECG    Confirmed by Len Foote ([id number]) on 9/11/2019 10:25:43 AM (09-10-19 @ 15:01)  12 Lead ECG:   Ventricular Rate 67 BPM    Atrial Rate 67 BPM    P-R Interval 176 ms    QRS Duration 114 ms    Q-T Interval 402 ms    QTC Calculation(Bezet) 424 ms    P Axis 32 degrees    R Axis 79 degrees    T Axis 52 degrees    Diagnosis Line Normal sinus rhythm  Possible Left atrial enlargement  Anteroseptal infarct , age undetermined  Abnormal ECG    Confirmed by Chin Liao ([id number]) on 9/9/2019 8:56:30 AM (09-07-19 @ 06:35)      MEDICATIONS  abacavir 600 mG/dolutegravir 50 mG/lamiVUDine 300 mG 1  carvedilol 3.125  chlorhexidine 0.12% Liquid 15  dextrose 50% Injectable 50  docusate sodium 100  guaiFENesin   insulin regular Infusion 1  ipratropium 17 MICROgram(s) HFA Inhaler 2  isosorbide   dinitrate Tablet (ISORDIL) 10  magnesium sulfate  IVPB 1  nitroglycerin  Infusion 5  pantoprazole    Tablet 40  polyethylene glycol 3350 17  sodium chloride 0.9%. 1000      ANTIBIOTICS:  abacavir 600 mG/dolutegravir 50 mG/lamiVUDine 300 mG 1 Tablet(s) Oral daily      All available historical records have been reviewed

## 2019-09-11 NOTE — PROGRESS NOTE ADULT - SUBJECTIVE AND OBJECTIVE BOX
49 yo M with HIV on ART, DM (HgA1C 6.3), DLD, recent cavity filling 8/26, presents to the ED for worsened left lower molar pain for 1 week, found to have ST elevations V2-3 and elevated CE, fever. Of note did not c/o chest pain at any point. Recent EKG/Stress test wnl. Cardiac Cath with multivessel disease.     POD # 1 - CABG x 4, L- radial. EF 25%  Currently on Nitro gtt      Vital Signs Last 24 Hrs  T(C): 36.7 (11 Sep 2019 07:00), Max: 38.7 (10 Sep 2019 20:00)  T(F): 98.1 (11 Sep 2019 07:00), Max: 101.7 (10 Sep 2019 20:00)  HR: 88 (11 Sep 2019 11:00) (79 - 109)  BP: 120/71 (11 Sep 2019 07:15) (103/58 - 128/75)  BP(mean): 90 (11 Sep 2019 07:15) (73 - 95)  RR: 14 (11 Sep 2019 11:00) (14 - 37)  SpO2: 94% (11 Sep 2019 10:30) (89% - 100%)    alert, awake, verbal  follows commands appropriately  Right IJ cordis with [place]  no JVD  S1S2 reg rate  mediastinal and pleural tubes  b/l air entry, left pleural   soft abdomen, non tender, non distended  warm periphery with + distal pulses, non pitting edema b/l  power 5/5 b/l in all 4 ext    7.36/36/46.4, LA 3.1 - on FM 40%  HCT 25.5    a/p:    Coronary artery disease POD # 1 CABG x4 with left radial and EF 25%  Acute Hypoxic resp failure  Lactic Acidosis  HTN  Acute post thoracotomy Pain  DM  Tooth pain s/p extraction    BiPAP to improve O2 sat and aid with resp effort  Lasix 40 mg IV x1  f/u labs at noon, including abg, vbg and LA.  keep [place] and femoral A line  Start coreg 3.125 bid and Isordil 10 mg TID.  keep off amio and statin due to LFTs.  finished course of Unasyn at 9/10.  Trend Chest tube outputs.  Resume with HAART  Glycemic control  OOB to chair  Discussed with CT surgery attending.    45 minutes of critical care time spent providing medical care for patient's acute illness/conditions that impairs at least one vital organ system and/or poses a high risk of imminent or life threatening deterioration in the patient's condition. It includes time spent evaluating and treating the patient's acute illness as well as time spent reviewing labs, radiology, discussing goals of care with patient and/or patient's family, and discussing the case with a multidisciplinary team in an effort to prevent further life threatening deterioration or end organ damage. This time is independent of any procedures performed.

## 2019-09-11 NOTE — PROGRESS NOTE ADULT - SUBJECTIVE AND OBJECTIVE BOX
OPERATIVE PROCEDURE(s):                POD #    SURGEON(s):      SUBJECTIVE ASSESSMENT:    Vital Signs Last 24 Hrs  T(C): 36.7 (11 Sep 2019 07:00), Max: 38.7 (10 Sep 2019 20:00)  T(F): 98.1 (11 Sep 2019 07:00), Max: 101.7 (10 Sep 2019 20:00)  HR: 106 (11 Sep 2019 09:00) (79 - 109)  BP: 120/71 (11 Sep 2019 07:15) (103/58 - 128/75)  BP(mean): 90 (11 Sep 2019 07:15) (73 - 95)  RR: 28 (11 Sep 2019 09:00) (15 - 37)  SpO2: 95% (11 Sep 2019 09:00) (94% - 100%)  09-10-19 @ 07:01  -  09-11-19 @ 07:00  --------------------------------------------------------  IN: 1301.6 mL / OUT: 2079 mL / NET: -777.3 mL    09-11-19 @ 07:01  -  09-11-19 @ 09:11  --------------------------------------------------------  IN: 256 mL / OUT: 206 mL / NET: 50 mL        Physical Exam  General:  Chest:  CVS:  Abd:   GI/ :  Ext:    Central Venous Catheter: Yes[ ]  No[ ] , If Yes indication:           Day #    Lane Catheter: Yes  [ ] , No [ ] : If yes indication:                         Day #    NGT: Yes [ ] No [  ]     If Yes Placement:                                          Day #    Post-Op Beta-Blockers: Yes [ ], No[ ], If No, then contraindication:    CHEST TUBE:  [ ] YES [ ] NO  OUTPUT:     per 24 hours    AIR LEAKS:  [ ] YES [ ] NO      EPICARDIAL WIRES:  [ ] YES [ ] NO      BOWEL MOVEMENT:  [ ] YES [ ] NO          LABS:                        8.4    6.64  )-----------( 70       ( 11 Sep 2019 00:00 )             24.7     COUMADIN:   [ ] YES [ ] NO    PT/INR - ( 11 Sep 2019 00:00 )   PT: 16.80 sec;   INR: 1.47 ratio         PTT - ( 11 Sep 2019 00:00 )  PTT:36.8 sec  09-11    138  |  106  |  15  ----------------------------<  101<H>  4.9   |  19  |  0.9    Ca    8.0<L>      11 Sep 2019 00:00  Mg     2.4     09-11    TPro  6.3  /  Alb  5.1  /  TBili  1.1  /  DBili  0.3<H>  /  AST  208<H>  /  ALT  248<H>  /  AlkPhos  23<L>  09-11        MEDICATIONS  (STANDING):  ALBUTerol    90 MICROgram(s) HFA Inhaler 2 Puff(s) Inhalation every 6 hours  amiodarone Infusion 0.015 mG/Min (0.5 mL/Hr) IV Continuous <Continuous>  chlorhexidine 0.12% Liquid 15 milliLiter(s) Oral Mucosa every 12 hours  dextrose 50% Injectable 50 milliLiter(s) IV Push every 15 minutes  docusate sodium 100 milliGRAM(s) Oral three times a day  famotidine Injectable 20 milliGRAM(s) IV Push every 12 hours  guaiFENesin  milliGRAM(s) Oral every 12 hours  insulin regular Infusion 1 Unit(s)/Hr (1 mL/Hr) IV Continuous <Continuous>  ipratropium 17 MICROgram(s) HFA Inhaler 2 Puff(s) Inhalation every 6 hours  ketorolac   Injectable 30 milliGRAM(s) IV Push every 6 hours  magnesium sulfate  IVPB 1 Gram(s) IV Intermittent every 12 hours  nitroglycerin  Infusion 5 MICROgram(s)/Min (1.5 mL/Hr) IV Continuous <Continuous>  polyethylene glycol 3350 17 Gram(s) Oral daily  sodium chloride 0.9%. 1000 milliLiter(s) (20 mL/Hr) IV Continuous <Continuous>    MEDICATIONS  (PRN):  oxyCODONE    IR 10 milliGRAM(s) Oral every 4 hours PRN Severe Pain (7 - 10)  oxyCODONE    IR 5 milliGRAM(s) Oral every 4 hours PRN Moderate Pain (4 - 6)      Allergies    No Known Allergies    Intolerances        Ambulation/Activity Status:        RADIOLOGY & ADDITIONAL TESTS:        Assessment/Plan:  cont present tx as per CT surgeon  Social Service Disposition: OPERATIVE PROCEDURE(s):        cabg x 4 / radial harvest        POD # 1    SURGEON(s):     SUBJECTIVE ASSESSMENT: pt is complaining of feeling anxious and had difficulty breathing-- pt was placed on bipap    Vital Signs Last 24 Hrs  T(C): 36.7 (11 Sep 2019 07:00), Max: 38.7 (10 Sep 2019 20:00)  T(F): 98.1 (11 Sep 2019 07:00), Max: 101.7 (10 Sep 2019 20:00)  HR: 106 (11 Sep 2019 09:00) (79 - 109)  BP: 120/71 (11 Sep 2019 07:15) (103/58 - 128/75)  BP(mean): 90 (11 Sep 2019 07:15) (73 - 95)  RR: 28 (11 Sep 2019 09:00) (15 - 37)  SpO2: 95% (11 Sep 2019 09:00) (94% - 100%)  09-10-19 @ 07:01  -  09-11-19 @ 07:00  --------------------------------------------------------  IN: 1301.6 mL / OUT: 2079 mL / NET: -777.3 mL    09-11-19 @ 07:01  -  09-11-19 @ 09:11  --------------------------------------------------------  IN: 256 mL / OUT: 206 mL / NET: 50 mL        Physical Exam  General: alert and oriented x 3  Chest: loud pericardial friction rub mild dec bs at bl bases  CVS: s1s2  Abd: pos bs soft nt  GI/ :  Ext: trace edema  incisions - dressed   sternum- intact    Central Venous Catheter: Yes[x ]  No[ ] , If Yes indication:           Day #    Lane Catheter: Yes  (x ] , No [ ] : If yes indication:                         Day #    NGT: Yes [ ] No [ x]     If Yes Placement:                                          Day #    Post-Op Beta-Blockers: Yes [ ], No[ ], If No, then contraindication:    CHEST TUBE:  [ ] YES [ ] NO  OUTPUT:     per 24 hours    AIR LEAKS:  [ ] YES [ ] NO      EPICARDIAL WIRES:  [ ] YES [ ] NO      BOWEL MOVEMENT:  [ ] YES [ ] NO          LABS:                        8.4    6.64  )-----------( 70       ( 11 Sep 2019 00:00 )             24.7     COUMADIN:   [ ] YES [ ] NO    PT/INR - ( 11 Sep 2019 00:00 )   PT: 16.80 sec;   INR: 1.47 ratio         PTT - ( 11 Sep 2019 00:00 )  PTT:36.8 sec  09-11    138  |  106  |  15  ----------------------------<  101<H>  4.9   |  19  |  0.9    Ca    8.0<L>      11 Sep 2019 00:00  Mg     2.4     09-11    TPro  6.3  /  Alb  5.1  /  TBili  1.1  /  DBili  0.3<H>  /  AST  208<H>  /  ALT  248<H>  /  AlkPhos  23<L>  09-11        MEDICATIONS  (STANDING):  ALBUTerol    90 MICROgram(s) HFA Inhaler 2 Puff(s) Inhalation every 6 hours  chlorhexidine 0.12% Liquid 15 milliLiter(s) Oral Mucosa every 12 hours  dextrose 50% Injectable 50 milliLiter(s) IV Push every 15 minutes  docusate sodium 100 milliGRAM(s) Oral three times a day  famotidine Injectable 20 milliGRAM(s) po every 12 hours  guaiFENesin  milliGRAM(s) Oral every 12 hours  insulin regular Infusion 1 Unit(s)/Hr (1 mL/Hr) IV Continuous <Continuous>  ipratropium 17 MICROgram(s) HFA Inhaler 2 Puff(s) Inhalation every 6 hours  ketorolac   Injectable 30 milliGRAM(s) IV Push every 6 hours  magnesium sulfate  IVPB 1 Gram(s) IV Intermittent every 12 hours  nitroglycerin  Infusion 5 MICROgram(s)/Min (1.5 mL/Hr) IV Continuous <Continuous>  isordil 10mg po q8h  coreg 3.125mg po q12h  polyethylene glycol 3350 17 Gram(s) Oral daily  sodium chloride 0.9%. 1000 milliLiter(s) (20 mL/Hr) IV Continuous <Continuous>    MEDICATIONS  (PRN):  oxyCODONE    IR 10 milliGRAM(s) Oral every 4 hours PRN Severe Pain (7 - 10)  oxyCODONE    IR 5 milliGRAM(s) Oral every 4 hours PRN Moderate Pain (4 - 6)      Allergies    No Known Allergies    Intolerances        Ambulation/Activity Status:    on bedrest now due to femoral a line and swan    RADIOLOGY & ADDITIONAL TESTS:    EXAM:  XR CHEST PORTABLE IMMED 1V            PROCEDURE DATE:  09/10/2019        INTERPRETATION:  Clinical History / Reason for exam: Chest pain    Comparison : Chest radiograph 9/1/2019.    Technique/Positioning: Frontal view..    Findings:    Support devices: Satisfactory position.    Cardiac/mediastinum/hilum: Poststernotomy. Normal size cardiac silhouette    Lung parenchyma/Pleura: No consolidation effusion or pneumothorax    Skeleton/soft tissues: Stable    Impression:      No consolidation effusion or pneumothorax.    Support devices, in satisfactory position.    Post sternotomy.    Assessment/Plan: Patient is a 47 yo male s/p cabg x 4 with radial artery harvest pod # 1   cont present tx as per CT surgeon  s/p cabg- no asa at present time due to plt count of 70; no statin at present time due to post op elevated LFT's; will start low dose Coreg  will place pt oob to chair once fem a line is removed ; there were multiple attempts at getting radial a line on right UE using US guidance however, was unsuccessful and in light of patient's hypoxia requiring Bipap, will keep fem a line for now  thrombocytopenia- will repeat CBC   hypoxia- cont bipap and gentle diuresis  gi ppx and dvt ppx with scd's - no hep due to tlow plt count  acute systolic chf- no acei/arb at present time to prevent kalin-op atn  repeat labs and serial abg's/mixed venous gases  Social Service Disposition:

## 2019-09-11 NOTE — PROGRESS NOTE ADULT - ASSESSMENT
IMPRESSION  47 yo M with HIV on ART, DM, DLD, recent cavity filling 8/26, presents to the ED for worsened left lower molar pain for 1 week, found to have ST elevations V2-3 and elevated CE, fever, found to have L neck submandicular cellulitis and s/p CABG    ASSESSMENT/RECOMMENDATION  - Post-op fever, monitor  - On triumeq, continue for now, but would D/C on odefsey as better lipid profile (would need to change PPI to H2 blocker as interaction with rilpivirine)  - Follows with Jewels Guadalupe    Spectra [phone]

## 2019-09-11 NOTE — PHYSICAL THERAPY INITIAL EVALUATION ADULT - SPECIFY REASON(S)
Chart reviewed. D/w team during rounds. Pt. to remain on hold at this time, on bedrest, +femoral line, +[place] Trisha line. PT to follow as appropriate.

## 2019-09-12 LAB
ALBUMIN SERPL ELPH-MCNC: 4.4 G/DL — SIGNIFICANT CHANGE UP (ref 3.5–5.2)
ALBUMIN SERPL ELPH-MCNC: 4.6 G/DL — SIGNIFICANT CHANGE UP (ref 3.5–5.2)
ALP SERPL-CCNC: 34 U/L — SIGNIFICANT CHANGE UP (ref 30–115)
ALP SERPL-CCNC: 38 U/L — SIGNIFICANT CHANGE UP (ref 30–115)
ALT FLD-CCNC: 428 U/L — HIGH (ref 0–41)
ALT FLD-CCNC: 482 U/L — HIGH (ref 0–41)
ANION GAP SERPL CALC-SCNC: 14 MMOL/L — SIGNIFICANT CHANGE UP (ref 7–14)
ANION GAP SERPL CALC-SCNC: 16 MMOL/L — HIGH (ref 7–14)
APTT BLD: 33.9 SEC — SIGNIFICANT CHANGE UP (ref 27–39.2)
AST SERPL-CCNC: 254 U/L — HIGH (ref 0–41)
AST SERPL-CCNC: 284 U/L — HIGH (ref 0–41)
BILIRUB SERPL-MCNC: 0.9 MG/DL — SIGNIFICANT CHANGE UP (ref 0.2–1.2)
BILIRUB SERPL-MCNC: 1 MG/DL — SIGNIFICANT CHANGE UP (ref 0.2–1.2)
BUN SERPL-MCNC: 17 MG/DL — SIGNIFICANT CHANGE UP (ref 10–20)
BUN SERPL-MCNC: 17 MG/DL — SIGNIFICANT CHANGE UP (ref 10–20)
CALCIUM SERPL-MCNC: 8.2 MG/DL — LOW (ref 8.5–10.1)
CALCIUM SERPL-MCNC: 8.8 MG/DL — SIGNIFICANT CHANGE UP (ref 8.5–10.1)
CHLORIDE SERPL-SCNC: 100 MMOL/L — SIGNIFICANT CHANGE UP (ref 98–110)
CHLORIDE SERPL-SCNC: 99 MMOL/L — SIGNIFICANT CHANGE UP (ref 98–110)
CO2 SERPL-SCNC: 20 MMOL/L — SIGNIFICANT CHANGE UP (ref 17–32)
CO2 SERPL-SCNC: 23 MMOL/L — SIGNIFICANT CHANGE UP (ref 17–32)
CREAT SERPL-MCNC: 0.8 MG/DL — SIGNIFICANT CHANGE UP (ref 0.7–1.5)
CREAT SERPL-MCNC: 0.9 MG/DL — SIGNIFICANT CHANGE UP (ref 0.7–1.5)
GLUCOSE BLDC GLUCOMTR-MCNC: 110 MG/DL — HIGH (ref 70–99)
GLUCOSE BLDC GLUCOMTR-MCNC: 116 MG/DL — HIGH (ref 70–99)
GLUCOSE BLDC GLUCOMTR-MCNC: 119 MG/DL — HIGH (ref 70–99)
GLUCOSE BLDC GLUCOMTR-MCNC: 125 MG/DL — HIGH (ref 70–99)
GLUCOSE BLDC GLUCOMTR-MCNC: 130 MG/DL — HIGH (ref 70–99)
GLUCOSE BLDC GLUCOMTR-MCNC: 131 MG/DL — HIGH (ref 70–99)
GLUCOSE BLDC GLUCOMTR-MCNC: 133 MG/DL — HIGH (ref 70–99)
GLUCOSE BLDC GLUCOMTR-MCNC: 133 MG/DL — HIGH (ref 70–99)
GLUCOSE BLDC GLUCOMTR-MCNC: 139 MG/DL — HIGH (ref 70–99)
GLUCOSE BLDC GLUCOMTR-MCNC: 146 MG/DL — HIGH (ref 70–99)
GLUCOSE BLDC GLUCOMTR-MCNC: 150 MG/DL — HIGH (ref 70–99)
GLUCOSE BLDC GLUCOMTR-MCNC: 155 MG/DL — HIGH (ref 70–99)
GLUCOSE SERPL-MCNC: 131 MG/DL — HIGH (ref 70–99)
GLUCOSE SERPL-MCNC: 148 MG/DL — HIGH (ref 70–99)
HCT VFR BLD CALC: 22.4 % — LOW (ref 42–52)
HCT VFR BLD CALC: 24.9 % — LOW (ref 42–52)
HGB BLD-MCNC: 7.6 G/DL — LOW (ref 14–18)
HGB BLD-MCNC: 8.4 G/DL — LOW (ref 14–18)
INR BLD: 1.55 RATIO — HIGH (ref 0.65–1.3)
LYMPHOCYTE PROLIF PNL BLD: ABNORMAL
MAGNESIUM SERPL-MCNC: 2.1 MG/DL — SIGNIFICANT CHANGE UP (ref 1.8–2.4)
MCHC RBC-ENTMCNC: 29 PG — SIGNIFICANT CHANGE UP (ref 27–31)
MCHC RBC-ENTMCNC: 29.1 PG — SIGNIFICANT CHANGE UP (ref 27–31)
MCHC RBC-ENTMCNC: 33.7 G/DL — SIGNIFICANT CHANGE UP (ref 32–37)
MCHC RBC-ENTMCNC: 33.9 G/DL — SIGNIFICANT CHANGE UP (ref 32–37)
MCV RBC AUTO: 85.8 FL — SIGNIFICANT CHANGE UP (ref 80–94)
MCV RBC AUTO: 85.9 FL — SIGNIFICANT CHANGE UP (ref 80–94)
NRBC # BLD: 0 /100 WBCS — SIGNIFICANT CHANGE UP (ref 0–0)
NRBC # BLD: 0 /100 WBCS — SIGNIFICANT CHANGE UP (ref 0–0)
PLATELET # BLD AUTO: 79 K/UL — LOW (ref 130–400)
PLATELET # BLD AUTO: 94 K/UL — LOW (ref 130–400)
POTASSIUM SERPL-MCNC: 4.2 MMOL/L — SIGNIFICANT CHANGE UP (ref 3.5–5)
POTASSIUM SERPL-MCNC: 4.4 MMOL/L — SIGNIFICANT CHANGE UP (ref 3.5–5)
POTASSIUM SERPL-SCNC: 4.2 MMOL/L — SIGNIFICANT CHANGE UP (ref 3.5–5)
POTASSIUM SERPL-SCNC: 4.4 MMOL/L — SIGNIFICANT CHANGE UP (ref 3.5–5)
PROT SERPL-MCNC: 6 G/DL — SIGNIFICANT CHANGE UP (ref 6–8)
PROT SERPL-MCNC: 6.5 G/DL — SIGNIFICANT CHANGE UP (ref 6–8)
PROTHROM AB SERPL-ACNC: 17.8 SEC — HIGH (ref 9.95–12.87)
RBC # BLD: 2.61 M/UL — LOW (ref 4.7–6.1)
RBC # BLD: 2.9 M/UL — LOW (ref 4.7–6.1)
RBC # FLD: 13.1 % — SIGNIFICANT CHANGE UP (ref 11.5–14.5)
RBC # FLD: 13.4 % — SIGNIFICANT CHANGE UP (ref 11.5–14.5)
SODIUM SERPL-SCNC: 136 MMOL/L — SIGNIFICANT CHANGE UP (ref 135–146)
SODIUM SERPL-SCNC: 136 MMOL/L — SIGNIFICANT CHANGE UP (ref 135–146)
WBC # BLD: 8.18 K/UL — SIGNIFICANT CHANGE UP (ref 4.8–10.8)
WBC # BLD: 9.84 K/UL — SIGNIFICANT CHANGE UP (ref 4.8–10.8)
WBC # FLD AUTO: 8.18 K/UL — SIGNIFICANT CHANGE UP (ref 4.8–10.8)
WBC # FLD AUTO: 9.84 K/UL — SIGNIFICANT CHANGE UP (ref 4.8–10.8)

## 2019-09-12 PROCEDURE — 93010 ELECTROCARDIOGRAM REPORT: CPT

## 2019-09-12 PROCEDURE — 71045 X-RAY EXAM CHEST 1 VIEW: CPT | Mod: 26

## 2019-09-12 PROCEDURE — 99291 CRITICAL CARE FIRST HOUR: CPT

## 2019-09-12 RX ORDER — DILTIAZEM HCL 120 MG
30 CAPSULE, EXT RELEASE 24 HR ORAL EVERY 6 HOURS
Refills: 0 | Status: DISCONTINUED | OUTPATIENT
Start: 2019-09-12 | End: 2019-09-14

## 2019-09-12 RX ORDER — CLOPIDOGREL BISULFATE 75 MG/1
75 TABLET, FILM COATED ORAL DAILY
Refills: 0 | Status: DISCONTINUED | OUTPATIENT
Start: 2019-09-12 | End: 2019-09-17

## 2019-09-12 RX ORDER — FENTANYL CITRATE 50 UG/ML
25 INJECTION INTRAVENOUS ONCE
Refills: 0 | Status: DISCONTINUED | OUTPATIENT
Start: 2019-09-12 | End: 2019-09-12

## 2019-09-12 RX ORDER — FUROSEMIDE 40 MG
40 TABLET ORAL ONCE
Refills: 0 | Status: COMPLETED | OUTPATIENT
Start: 2019-09-12 | End: 2019-09-12

## 2019-09-12 RX ORDER — COLCHICINE 0.6 MG
0.6 TABLET ORAL
Refills: 0 | Status: DISCONTINUED | OUTPATIENT
Start: 2019-09-12 | End: 2019-09-13

## 2019-09-12 RX ORDER — HEPARIN SODIUM 5000 [USP'U]/ML
5000 INJECTION INTRAVENOUS; SUBCUTANEOUS
Refills: 0 | Status: DISCONTINUED | OUTPATIENT
Start: 2019-09-12 | End: 2019-09-17

## 2019-09-12 RX ORDER — KETOROLAC TROMETHAMINE 30 MG/ML
30 SYRINGE (ML) INJECTION ONCE
Refills: 0 | Status: DISCONTINUED | OUTPATIENT
Start: 2019-09-12 | End: 2019-09-12

## 2019-09-12 RX ADMIN — Medication 100 MILLIGRAM(S): at 21:26

## 2019-09-12 RX ADMIN — FENTANYL CITRATE 25 MICROGRAM(S): 50 INJECTION INTRAVENOUS at 00:00

## 2019-09-12 RX ADMIN — FENTANYL CITRATE 25 MICROGRAM(S): 50 INJECTION INTRAVENOUS at 05:00

## 2019-09-12 RX ADMIN — OXYCODONE HYDROCHLORIDE 10 MILLIGRAM(S): 5 TABLET ORAL at 16:50

## 2019-09-12 RX ADMIN — Medication 40 MILLIGRAM(S): at 09:16

## 2019-09-12 RX ADMIN — OXYCODONE HYDROCHLORIDE 10 MILLIGRAM(S): 5 TABLET ORAL at 20:06

## 2019-09-12 RX ADMIN — OXYCODONE HYDROCHLORIDE 10 MILLIGRAM(S): 5 TABLET ORAL at 12:00

## 2019-09-12 RX ADMIN — Medication 100 MILLIGRAM(S): at 13:37

## 2019-09-12 RX ADMIN — FENTANYL CITRATE 25 MICROGRAM(S): 50 INJECTION INTRAVENOUS at 00:30

## 2019-09-12 RX ADMIN — Medication 40 MILLIGRAM(S): at 00:00

## 2019-09-12 RX ADMIN — OXYCODONE HYDROCHLORIDE 10 MILLIGRAM(S): 5 TABLET ORAL at 07:30

## 2019-09-12 RX ADMIN — HEPARIN SODIUM 5000 UNIT(S): 5000 INJECTION INTRAVENOUS; SUBCUTANEOUS at 17:14

## 2019-09-12 RX ADMIN — Medication 100 MILLIGRAM(S): at 05:53

## 2019-09-12 RX ADMIN — PANTOPRAZOLE SODIUM 40 MILLIGRAM(S): 20 TABLET, DELAYED RELEASE ORAL at 06:13

## 2019-09-12 RX ADMIN — POLYETHYLENE GLYCOL 3350 17 GRAM(S): 17 POWDER, FOR SOLUTION ORAL at 11:13

## 2019-09-12 RX ADMIN — Medication 30 MILLIGRAM(S): at 10:15

## 2019-09-12 RX ADMIN — ISOSORBIDE DINITRATE 10 MILLIGRAM(S): 5 TABLET ORAL at 05:54

## 2019-09-12 RX ADMIN — Medication 600 MILLIGRAM(S): at 17:14

## 2019-09-12 RX ADMIN — OXYCODONE HYDROCHLORIDE 10 MILLIGRAM(S): 5 TABLET ORAL at 12:30

## 2019-09-12 RX ADMIN — CARVEDILOL PHOSPHATE 3.12 MILLIGRAM(S): 80 CAPSULE, EXTENDED RELEASE ORAL at 05:53

## 2019-09-12 RX ADMIN — FENTANYL CITRATE 25 MICROGRAM(S): 50 INJECTION INTRAVENOUS at 05:30

## 2019-09-12 RX ADMIN — Medication 30 MILLIGRAM(S): at 11:12

## 2019-09-12 RX ADMIN — Medication 30 MILLIGRAM(S): at 17:14

## 2019-09-12 RX ADMIN — OXYCODONE HYDROCHLORIDE 10 MILLIGRAM(S): 5 TABLET ORAL at 21:00

## 2019-09-12 RX ADMIN — CHLORHEXIDINE GLUCONATE 15 MILLILITER(S): 213 SOLUTION TOPICAL at 05:53

## 2019-09-12 RX ADMIN — OXYCODONE HYDROCHLORIDE 10 MILLIGRAM(S): 5 TABLET ORAL at 16:20

## 2019-09-12 RX ADMIN — Medication 30 MILLIGRAM(S): at 23:36

## 2019-09-12 RX ADMIN — Medication 81 MILLIGRAM(S): at 11:12

## 2019-09-12 RX ADMIN — OXYCODONE HYDROCHLORIDE 10 MILLIGRAM(S): 5 TABLET ORAL at 08:00

## 2019-09-12 RX ADMIN — Medication 30 MILLIGRAM(S): at 10:00

## 2019-09-12 RX ADMIN — ISOSORBIDE DINITRATE 10 MILLIGRAM(S): 5 TABLET ORAL at 21:26

## 2019-09-12 RX ADMIN — ISOSORBIDE DINITRATE 10 MILLIGRAM(S): 5 TABLET ORAL at 15:36

## 2019-09-12 RX ADMIN — Medication 0.6 MILLIGRAM(S): at 17:14

## 2019-09-12 RX ADMIN — Medication 100 GRAM(S): at 17:15

## 2019-09-12 RX ADMIN — Medication 100 GRAM(S): at 05:53

## 2019-09-12 RX ADMIN — OXYCODONE HYDROCHLORIDE 10 MILLIGRAM(S): 5 TABLET ORAL at 01:55

## 2019-09-12 RX ADMIN — Medication 600 MILLIGRAM(S): at 05:53

## 2019-09-12 RX ADMIN — OXYCODONE HYDROCHLORIDE 10 MILLIGRAM(S): 5 TABLET ORAL at 02:25

## 2019-09-12 NOTE — PHYSICAL THERAPY INITIAL EVALUATION ADULT - ADDITIONAL COMMENTS
Pt lives in house with wife. Pt has 4 steps to enter with bilateral railing and 1 flight of stairs to get to the bedroom. Wife does not work and will be able to assist.

## 2019-09-12 NOTE — CHART NOTE - NSCHARTNOTEFT_GEN_A_CORE
Registered Dietitian Follow-Up     Patient Profile Reviewed                           Yes [x]   No []     Nutrition History Previously Obtained        Yes [x]  No []       Pertinent Subjective Information: Observed pt OOB to chair w/ nasal cannula, off BiPAP. Reports he only tolerated yogurt for lunch, did not eat breakfast (on BIPAP). Pt reports sore throat from intubation. Not interested in nutrition supplements. Says he ordered yogurt for dinner. [facility] healthy diet guidelines provided.      Pertinent Medical Interventions:  s/p CABG POD#2. CAD: Continue ASA. Wean BIPAP off as tolerated, start [place] O2.      Diet order: CHO (no snack) DASH/TLC, 1L fluid restriction      Anthropometrics:  - Ht. 172.72 cm   - Wt. 84.8 kg (9/3) vs 94 kg (9/11)  bed scale error vs. 1+ generalzed edema - will cont to monitor  - %wt change  - BMI 28.4   - IBW: 70 kg     Pertinent Lab Data: (9/12) H/H 8.4/24.9  glucose 131      Pertinent Meds: plavix, heparin, triumeq, NaCl 0.9%, insulin regular infusion, colace, ppi, miralax     Physical Findings:  - Appearance: alert, slow to answer questions   - GI function: LBM 9/9   - Tubes:  - Oral/Mouth cavity: Tolerates soft texture  - Skin: BS 15 surgical incision, 1+ generalized edema      Nutrition Requirements  Weight Used: 84.8 kg needs cont from RD assessment 9/9     estimated calorie needs: 2040 - 2210 kcals/day (MSJ x 1.2 - 1.3 AF)  estimated protein needs: 85 - 102 gms/kg (1.0 - 1.2 gm/kg lean towards higher end after CABG)  estimated fluid needs: per CTU team    Nutrient Intake: not meeting needs.          [] Previous Nutrition Diagnosis: Unintended Weight Loss.            [] Ongoing          [x] Resolved? pt w/ fluid shifts     [] No active nutrition diagnosis identified at this time     Nutrition Diagnostic #1  Problem: inadequate oral intake   Etiology: s/p CABG  Statement: Pt eating only yogurt for lunch      Nutrition Diagnostic #2  Problem:  Etiology:  Statement:     Nutrition Intervention meal/snack,      Goal/Expected Outcome: Pt to consume >75% of meal tray in 4 days     Indicator/Monitoring: RD to monitor energy intake, diet order, body comp, NFPF     Recommendation: Add Dysphagia 3 soft thin liquids to current diet order.

## 2019-09-12 NOTE — PHYSICAL THERAPY INITIAL EVALUATION ADULT - PHYSICAL ASSIST/NONPHYSICAL ASSIST: GAIT, REHAB EVAL
Cueing for safety, breathing technique, hand placement, CW use, posture./verbal cues/nonverbal cues (demo/gestures)

## 2019-09-12 NOTE — PROGRESS NOTE ADULT - SUBJECTIVE AND OBJECTIVE BOX
OPERATIVE PROCEDURE(s):       CABG         POD #   2                    48yMale  SURGEON(s): VIDHI Roldan  SUBJECTIVE ASSESSMENT:  Patient has no complaints at this time.    Vital Signs Last 24 Hrs  T(F): 99.3 (12 Sep 2019 12:00), Max: 101.5 (11 Sep 2019 17:00)  HR: 101 (12 Sep 2019 13:00) (87 - 114)  BP: 100/55 (12 Sep 2019 13:00) (100/55 - 119/66)  BP(mean): 74 (12 Sep 2019 13:00) (73 - 90)  ABP: 125/70 (12 Sep 2019 08:00) (106/61 - 163/86)  ABP(mean): 87 (12 Sep 2019 08:00)  RR: 18 (12 Sep 2019 13:00) (7 - 35)  SpO2: 96% (12 Sep 2019 13:00) (96% - 100%)  CVP(mm Hg): 10 (12 Sep 2019 08:15)  CO: 8 (12 Sep 2019 08:00)  CI: 4 (12 Sep 2019 08:00)  PA: 26/10 (12 Sep 2019 08:15)  SVR: 709 (12 Sep 2019 08:00)    I&O's Detail    11 Sep 2019 07:01  -  12 Sep 2019 07:00  --------------------------------------------------------  IN:    insulin regular Infusion: 54 mL    IV PiggyBack: 250 mL    milrinone  Infusion: 144 mL    nitroglycerin  Infusion: 395 mL    Oral Fluid: 370 mL    sodium chloride 0.9%.: 370 mL  Total IN: 1583 mL    OUT:    Chest Tube: 200 mL    Chest Tube: 60 mL    Indwelling Catheter - Urethral: 3126 mL  Total OUT: 3386 mL        Net:   I&O's Detail    10 Sep 2019 07:01  -  11 Sep 2019 07:00  --------------------------------------------------------  Total NET: -777.3 mL      11 Sep 2019 07:01  -  12 Sep 2019 07:00  --------------------------------------------------------  Total NET: -1803 mL        CAPILLARY BLOOD GLUCOSE  130 (12 Sep 2019 12:00)  110 (12 Sep 2019 10:00)  125 (12 Sep 2019 08:00)  157 (11 Sep 2019 18:00)  136 (11 Sep 2019 17:00)  141 (11 Sep 2019 15:30)      POCT Blood Glucose.: 155 mg/dL (12 Sep 2019 13:50)  POCT Blood Glucose.: 130 mg/dL (12 Sep 2019 12:14)  POCT Blood Glucose.: 110 mg/dL (12 Sep 2019 10:00)  POCT Blood Glucose.: 125 mg/dL (12 Sep 2019 08:00)  POCT Blood Glucose.: 133 mg/dL (12 Sep 2019 06:36)  POCT Blood Glucose.: 133 mg/dL (12 Sep 2019 03:58)  POCT Blood Glucose.: 150 mg/dL (12 Sep 2019 02:36)  POCT Blood Glucose.: 146 mg/dL (12 Sep 2019 01:13)  POCT Blood Glucose.: 115 mg/dL (11 Sep 2019 23:51)  POCT Blood Glucose.: 94 mg/dL (11 Sep 2019 22:16)  POCT Blood Glucose.: 113 mg/dL (11 Sep 2019 21:19)  POCT Blood Glucose.: 147 mg/dL (11 Sep 2019 19:34)  POCT Blood Glucose.: 157 mg/dL (11 Sep 2019 18:28)  POCT Blood Glucose.: 141 mg/dL (11 Sep 2019 15:42)    Physical Exam:  General: NAD; A&Ox3  Cardiac: S1/S2, RRR, no murmur, no rubs  Lungs: unlabored respirations, CTA b/l, no wheeze, no rales, no crackles  Abdomen: Soft/NT/ND; positive bowel sounds x 4  Sternum: Intact, no click, incision healing well with no drainage  Incisions: Incisions clean/dry/intact  Extremities: 2+ edema b/l lower extremities; good capillary refill; no cyanosis; palpable 1+ pedal pulses b/l        LABS:                        8.4<L>  9.84  )-----------( 94<L>    ( 12 Sep 2019 12:27 )             24.9<L>                        7.6<L>  8.18  )-----------( 79<L>    ( 12 Sep 2019 04:00 )             22.4<L>    09-12    136  |  99  |  17  ----------------------------<  131<H>  4.2   |  23  |  0.9  09-12    136  |  100  |  17  ----------------------------<  148<H>  4.4   |  20  |  0.8    Ca    8.8      12 Sep 2019 12:27  Mg     2.1     09-12    TPro  6.5 [6.0 - 8.0]  /  Alb  4.6 [3.5 - 5.2]  /  TBili  0.9 [0.2 - 1.2]  /  DBili  x   /  AST  254<H> [0 - 41]  /  ALT  482<H> [0 - 41]  /  AlkPhos  38 [30 - 115]  09-12    PT/INR - ( 12 Sep 2019 04:00 )   PT: ;   INR: 1.55 ratio         PT/INR - ( 11 Sep 2019 12:00 )   PT: ;   INR: 1.55 ratio         PTT - ( 12 Sep 2019 04:00 )  PTT:33.9 sec, PTT - ( 11 Sep 2019 12:00 )  PTT:30.8 sec    ABG - ( 12 Sep 2019 04:31 )  pH: 7.44  /  pCO2: 35    /  pO2: 108   / HCO3: 24    / Base Excess: -0.1  /  SaO2: 98    /  LA: 0.9              RADIOLOGY & ADDITIONAL TESTS:  CXR: Xray Chest 1 View- PORTABLE-Routine:   EXAM:  XR CHEST PORTABLE ROUTINE 1V            PROCEDURE DATE:  09/12/2019            INTERPRETATION:  CLINICAL HISTORY / REASON FOR EXAM: Post cardiac surgery    COMPARISON : Chest radiograph 9/11/2019.    TECHNIQUE/POSITIONING: Frontal view of the chest    FINDINGS:    Support Devices: Right [place]-Trisha catheter with tip overlying the left   main pulmonary artery.    Cardiac/Mediastinum/Hilum: Stable cardiomegaly.    Lung Parenchyma/Pleura: Bibasilar opacities, improved. No evidence of   pleural effusion or pneumothorax.    Skeleton/Soft Tissues: Stable    IMPRESSION:      Bibasilar opacities, improved. No evidence of pleural effusion or   pneumothorax.              JASON VILLALPANDO M.D., RESIDENT RADIOLOGIST  This document has been electronically signed.  JL SILVA M.D., ATTENDING RADIOLOGIST  This document has been electronically signed. Sep 12 2019  1:42PM             (09-12-19 @ 05:28)    EKG:  MEDICATIONS  (STANDING):  abacavir 600 mG/dolutegravir 50 mG/lamiVUDine 300 mG 1 Tablet(s) Oral daily  aspirin enteric coated 81 milliGRAM(s) Oral daily  clopidogrel Tablet 75 milliGRAM(s) Oral daily  colchicine 0.6 milliGRAM(s) Oral two times a day  dextrose 50% Injectable 50 milliLiter(s) IV Push every 15 minutes  diltiazem    Tablet 30 milliGRAM(s) Oral every 6 hours  docusate sodium 100 milliGRAM(s) Oral three times a day  guaiFENesin  milliGRAM(s) Oral every 12 hours  heparin  Injectable 5000 Unit(s) SubCutaneous two times a day  insulin regular Infusion 1 Unit(s)/Hr (1 mL/Hr) IV Continuous <Continuous>  ipratropium 17 MICROgram(s) HFA Inhaler 2 Puff(s) Inhalation every 6 hours  isosorbide   dinitrate Tablet (ISORDIL) 10 milliGRAM(s) Oral three times a day  magnesium sulfate  IVPB 1 Gram(s) IV Intermittent every 12 hours  milrinone Infusion 0.2 MICROgram(s)/kG/Min (5.088 mL/Hr) IV Continuous <Continuous>  nitroglycerin  Infusion 5 MICROgram(s)/Min (1.5 mL/Hr) IV Continuous <Continuous>  pantoprazole    Tablet 40 milliGRAM(s) Oral before breakfast  polyethylene glycol 3350 17 Gram(s) Oral daily  sodium chloride 0.9%. 1000 milliLiter(s) (20 mL/Hr) IV Continuous <Continuous>    MEDICATIONS  (PRN):  oxyCODONE    IR 10 milliGRAM(s) Oral every 4 hours PRN Severe Pain (7 - 10)  oxyCODONE    IR 5 milliGRAM(s) Oral every 4 hours PRN Moderate Pain (4 - 6)    HEPARIN:  [] YES [x] NO  Dose: XX UNITS/HR UNITS Q8H  SCD's: YES b/l  No DVT Proph as per Dr. Roldan due to high risk for bleeding  GI Prophylaxis: Protonix [x], Pepcid [], None [], (Contra-indication:.....)    Post-Op Aspirin: Yes [x],  No [], If No, then contra-indication:  Post-Op Statin: Yes [], No[x], If No, then contra-indication: elevated LFT's  Post-Op Beta-Blockers: Yes [], No [x], If No, then contra-indication: Hemodynamically unstable in Primacor    Allergies:  No Known Allergies      Ambulation/Activity Status: Ambulates several times daily without assistance.    Assessment/Plan:  48y Male status-post CABG  - Case and plan discussed with CTU Intensivist and CT Surgeon - Dr. Roldan/Lata/Patrizia   - Continue CTU supportive care    - Continue DVT/GI prophylaxis  - Incentive Spirometry 10 times an hour  - Continue to advance physical activity as tolerated and continue PT/OT as directed  1. CAD: Continue ASA, hold statin and BB  2. Start Cardizem for rate control and for HTN  3. D/C swan trisha and A-Line  4. Blood cultures today

## 2019-09-12 NOTE — PHYSICAL THERAPY INITIAL EVALUATION ADULT - GENERAL OBSERVATIONS, REHAB EVAL
Chart Reviewed. Pt encountered in bed with BPAP changed to 100% NRB as per RN Alysha, +pulse ox, +[place], +tele.

## 2019-09-12 NOTE — PROGRESS NOTE ADULT - ASSESSMENT
IMPRESSION  47 yo M with HIV on ART, DM, DLD, recent cavity filling 8/26, presents to the ED for worsened left lower molar pain for 1 week, found to have ST elevations V2-3 and elevated CE, fever, found to have L neck submandicular cellulitis and s/p CABG    Post-op course with fever, transaminitis, pericardial effusion     ASSESSMENT/RECOMMENDATION  #Post-op fever  - Send BCX  - Monitor off abx    #Asymptomatic HIV  - On triumeq, continue for now, but would D/C on odefsey as better lipid profile (would need to change PPI to H2 blocker as interaction with rilpivirine)  - Follows with Jewels Guadalupe    Spectra [phone]

## 2019-09-12 NOTE — PROGRESS NOTE ADULT - SUBJECTIVE AND OBJECTIVE BOX
49 yo M with HIV on ART, DM (HgA1C 6.3), DLD, recent cavity filling 8/26, presents to the ED for worsened left lower molar pain for 1 week, found to have ST elevations V2-3 and elevated CE, fever. Of note did not c/o chest pain at any point. Recent EKG/Stress test wnl. Cardiac Cath with multivessel disease.     POD # 2 - CABG x 4, L- radial. EF 25%  Currently on Nitro gtt and Primacor at 0.375  BiPAP 50%, 14/8    Vital Signs Last 24 Hrs  T(C): 37.9 (12 Sep 2019 08:00), Max: 38.6 (11 Sep 2019 17:00)  T(F): 100.2 (12 Sep 2019 08:00), Max: 101.5 (11 Sep 2019 17:00)  HR: 96 (12 Sep 2019 09:00) (84 - 115)  BP: 117/67 (12 Sep 2019 09:00) (108/60 - 133/78)  BP(mean): 86 (12 Sep 2019 09:00) (77 - 100)  RR: 27 (12 Sep 2019 09:00) (7 - 35)  SpO2: 100% (12 Sep 2019 09:00) (80% - 100%)    alert, awake, verbal  follows commands appropriately  Right IJ cordis with Pennington  no JVD  S1S2 reg rate  b/l air entry, diminished effort, some rhonchi  soft abdomen, non tender, non distended  warm periphery with + distal pulses, non pitting edema b/l  power 5/5 b/l in all 4 ext    HCT 22.4, plt 79  INR 1.55  BUN 17, Cr 0.8, K 4.4  Amylase 148, AST/ALT - 284/428  7.44/35/108, LA 0.9, SvO2 - 55    CXR - poor inspiratory effort, PA cath in place, no pnth, b/l congestion    a/p:    CAD s/p CABG x4, L radial harvest  Congestive hepatopathy  Acute Hypoxic resp failure  HTN  Acute post thoracotomy Pain  DM  Tooth pain s/p extraction    PO ASA 81 mg, keep off plavix and heparin SC as plt at 79  wean Milrinone to 0.2, wean nitro as tolerated  BiPAP to improve O2 sat and aid with resp effort - can swap to HF NC once in chair.  Lasix 40 mg IV x1  PO CCB- Cardizem as per request of CT surgery attending.  remove Pennington and femoral A line - OOB to chair  No beta blocker at this time. Isordil 10 mg TID.  Trend LFTs  finished course of Unasyn at 9/10.  Resume with HAART  Glycemic control  OOB to chair      40 minutes of critical care time spent providing medical care for patient's acute illness/conditions that impairs at least one vital organ system and/or poses a high risk of imminent or life threatening deterioration in the patient's condition. It includes time spent evaluating and treating the patient's acute illness as well as time spent reviewing labs, radiology, discussing goals of care with patient and/or patient's family, and discussing the case with a multidisciplinary team in an effort to prevent further life threatening deterioration or end organ damage. This time is independent of any procedures performed.

## 2019-09-12 NOTE — PHYSICAL THERAPY INITIAL EVALUATION ADULT - BALANCE DISTURBANCE, IDENTIFIED IMPAIRMENT CONTRIBUTE, REHAB EVAL
Hamzah Mrs Castillo and Mrs Rocha,Please schedule the patient for CBC, CMP, LDH and haptoglobin at the closest facility near [facility] on 11/15 and 12/13.  Please schedule the patient to see me on 1/10/18 with CBC, CMP, LDH and haptoglobin prior to the appt.ThanksLópez Go MD PGY-VIHematology and OncologyPager:[phone]
impaired postural control

## 2019-09-12 NOTE — PROGRESS NOTE ADULT - SUBJECTIVE AND OBJECTIVE BOX
BRISEIDA SIMON  48y, Male  Allergy: No Known Allergies      CHIEF COMPLAINT: Fevers and left-sided tooth/facial pain (11 Sep 2019 12:10)      INTERVAL EVENTS/HPI  - No acute events overnight  - T(F): , Max: 101.5 (09-11-19 @ 17:00)  - Denies any worsening symptoms, no diarrhea, cough, abd pain  - pericardial effusion  - transaminitis  - Tolerating medication  - WBC Count: 8.18 K/uL (09-12-19 @ 04:00)      ROS  General: Denies rigors, nightsweats  HEENT: Denies headache, rhinorrhea, sore throat, eye pain  CV: Denies CP, palpitations  PULM: Denies SOB, wheezing  GI: Denies hematemesis, hematochezia, melena  : Denies discharge, hematuria  MSK: Denies arthralgias, myalgias  SKIN: Denies rash, lesions  NEURO: Denies paresthesias, weakness  PSYCH: Denies depression, anxiety    VITALS:  T(F): 100.2, Max: 101.5 (09-11-19 @ 17:00)  HR: 100  BP: 119/66  RR: 24Vital Signs Last 24 Hrs  T(C): 37.9 (12 Sep 2019 08:00), Max: 38.6 (11 Sep 2019 17:00)  T(F): 100.2 (12 Sep 2019 08:00), Max: 101.5 (11 Sep 2019 17:00)  HR: 100 (12 Sep 2019 08:00) (84 - 115)  BP: 119/66 (12 Sep 2019 07:30) (108/60 - 133/78)  BP(mean): 87 (12 Sep 2019 07:30) (77 - 100)  RR: 24 (12 Sep 2019 08:00) (7 - 35)  SpO2: 100% (12 Sep 2019 08:00) (80% - 100%)    PHYSICAL EXAM:  Gen: NAD, resting in bed on BIPAP  HEENT: Normocephalic, atraumatic  Neck: supple, no lymphadenopathy  CV: Regular rate & regular rhythm, midline incision cdi  Lungs: decreased BS at bases, no fremitus  Abdomen: Soft, BS present  Ext: Warm, well perfused, LUE incision cdi  Neuro: non focal, awake  Skin: no rash, no erythema  Lines: no phlebitis  toledo      FH: Non-contributory  Social Hx: Non-contributory    TESTS & MEASUREMENTS:                        7.6    8.18  )-----------( 79       ( 12 Sep 2019 04:00 )             22.4     09-12    136  |  100  |  17  ----------------------------<  148<H>  4.4   |  20  |  0.8    Ca    8.2<L>      12 Sep 2019 04:00  Mg     2.1     09-12    TPro  6.0  /  Alb  4.4  /  TBili  1.0  /  DBili  x   /  AST  284<H>  /  ALT  428<H>  /  AlkPhos  34  09-12    eGFR if Non : 106 mL/min/1.73M2 (09-12-19 @ 04:00)  eGFR if African American: 123 mL/min/1.73M2 (09-12-19 @ 04:00)  eGFR if Non : 106 mL/min/1.73M2 (09-11-19 @ 20:14)  eGFR if African American: 123 mL/min/1.73M2 (09-11-19 @ 20:14)  eGFR if African American: 117 mL/min/1.73M2 (09-11-19 @ 12:00)  eGFR if Non African American: 101 mL/min/1.73M2 (09-11-19 @ 12:00)    LIVER FUNCTIONS - ( 12 Sep 2019 04:00 )  Alb: 4.4 g/dL / Pro: 6.0 g/dL / ALK PHOS: 34 U/L / ALT: 428 U/L / AST: 284 U/L / GGT: x                 Blood Gas Venous - Lactate: 0.9 mmoL/L (09-12-19 @ 04:31)  Blood Gas Venous - Lactate: 1.2 mmoL/L (09-11-19 @ 14:01)  Blood Gas Venous - Lactate: 1.7 mmoL/L (09-11-19 @ 12:21)  Blood Gas Venous - Lactate: 1.0 mmoL/L (09-11-19 @ 02:34)      INFECTIOUS DISEASES TESTING  MRSA PCR Result.: Negative (09-08-19 @ 17:59)  HIV-1/2 Combo Result: Reactive (09-02-19 @ 05:44)      RADIOLOGY & ADDITIONAL TESTS:  I have personally reviewed the last Chest xray  CXR      CT      CARDIOLOGY TESTING  12 Lead ECG:   Ventricular Rate 98 BPM    Atrial Rate 98 BPM    P-R Interval 190 ms    QRS Duration 108 ms    Q-T Interval 386 ms    QTC Calculation(Bezet) 492 ms    P Axis -1 degrees    R Axis 77 degrees    T Axis -28 degrees    Diagnosis Line Normal sinus rhythm  Anteroseptal infarct , possibly acute  T wave abnormality, consider inferior ischemia  *** ** ** ** * ACUTE MI  ** ** ** **  Abnormal ECG    Confirmed by Len Foote ([id number]) on 9/11/2019 10:25:43 AM (09-10-19 @ 15:01)  12 Lead ECG:   Ventricular Rate 67 BPM    Atrial Rate 67 BPM    P-R Interval 176 ms    QRS Duration 114 ms    Q-T Interval 402 ms    QTC Calculation(Bezet) 424 ms    P Axis 32 degrees    R Axis 79 degrees    T Axis 52 degrees    Diagnosis Line Normal sinus rhythm  Possible Left atrial enlargement  Anteroseptal infarct , age undetermined  Abnormal ECG    Confirmed by Chin Liao ([id number]) on 9/9/2019 8:56:30 AM (09-07-19 @ 06:35)      MEDICATIONS  abacavir 600 mG/dolutegravir 50 mG/lamiVUDine 300 mG 1  aspirin enteric coated 81  dextrose 50% Injectable 50  diltiazem    Tablet 30  docusate sodium 100  furosemide   Injectable 40  guaiFENesin   insulin regular Infusion 1  ipratropium 17 MICROgram(s) HFA Inhaler 2  isosorbide   dinitrate Tablet (ISORDIL) 10  magnesium sulfate  IVPB 1  milrinone Infusion 0.2  nitroglycerin  Infusion 5  pantoprazole    Tablet 40  polyethylene glycol 3350 17  sodium chloride 0.9%. 1000      ANTIBIOTICS:  abacavir 600 mG/dolutegravir 50 mG/lamiVUDine 300 mG 1 Tablet(s) Oral daily      All available historical records have been reviewed

## 2019-09-13 ENCOUNTER — TRANSCRIPTION ENCOUNTER (OUTPATIENT)
Age: 49
End: 2019-09-13

## 2019-09-13 LAB
ALBUMIN SERPL ELPH-MCNC: 4.4 G/DL — SIGNIFICANT CHANGE UP (ref 3.5–5.2)
ALP SERPL-CCNC: 43 U/L — SIGNIFICANT CHANGE UP (ref 30–115)
ALT FLD-CCNC: 525 U/L — HIGH (ref 0–41)
AMYLASE P1 CFR SERPL: 111 U/L — SIGNIFICANT CHANGE UP (ref 25–115)
ANION GAP SERPL CALC-SCNC: 14 MMOL/L — SIGNIFICANT CHANGE UP (ref 7–14)
APTT BLD: 26.8 SEC — LOW (ref 27–39.2)
AST SERPL-CCNC: 242 U/L — HIGH (ref 0–41)
BILIRUB SERPL-MCNC: 0.9 MG/DL — SIGNIFICANT CHANGE UP (ref 0.2–1.2)
BUN SERPL-MCNC: 18 MG/DL — SIGNIFICANT CHANGE UP (ref 10–20)
CALCIUM SERPL-MCNC: 8.7 MG/DL — SIGNIFICANT CHANGE UP (ref 8.5–10.1)
CHLORIDE SERPL-SCNC: 97 MMOL/L — LOW (ref 98–110)
CO2 SERPL-SCNC: 23 MMOL/L — SIGNIFICANT CHANGE UP (ref 17–32)
CREAT SERPL-MCNC: 0.8 MG/DL — SIGNIFICANT CHANGE UP (ref 0.7–1.5)
GLUCOSE BLDC GLUCOMTR-MCNC: 117 MG/DL — HIGH (ref 70–99)
GLUCOSE BLDC GLUCOMTR-MCNC: 119 MG/DL — HIGH (ref 70–99)
GLUCOSE BLDC GLUCOMTR-MCNC: 121 MG/DL — HIGH (ref 70–99)
GLUCOSE BLDC GLUCOMTR-MCNC: 163 MG/DL — HIGH (ref 70–99)
GLUCOSE BLDC GLUCOMTR-MCNC: 171 MG/DL — HIGH (ref 70–99)
GLUCOSE SERPL-MCNC: 109 MG/DL — HIGH (ref 70–99)
HCT VFR BLD CALC: 23.3 % — LOW (ref 42–52)
HGB BLD-MCNC: 7.9 G/DL — LOW (ref 14–18)
INR BLD: 1.43 RATIO — HIGH (ref 0.65–1.3)
MAGNESIUM SERPL-MCNC: 2.2 MG/DL — SIGNIFICANT CHANGE UP (ref 1.8–2.4)
MCHC RBC-ENTMCNC: 29.6 PG — SIGNIFICANT CHANGE UP (ref 27–31)
MCHC RBC-ENTMCNC: 33.9 G/DL — SIGNIFICANT CHANGE UP (ref 32–37)
MCV RBC AUTO: 87.3 FL — SIGNIFICANT CHANGE UP (ref 80–94)
NRBC # BLD: 0 /100 WBCS — SIGNIFICANT CHANGE UP (ref 0–0)
PLATELET # BLD AUTO: 112 K/UL — LOW (ref 130–400)
POTASSIUM SERPL-MCNC: 4 MMOL/L — SIGNIFICANT CHANGE UP (ref 3.5–5)
POTASSIUM SERPL-SCNC: 4 MMOL/L — SIGNIFICANT CHANGE UP (ref 3.5–5)
PROT SERPL-MCNC: 6.5 G/DL — SIGNIFICANT CHANGE UP (ref 6–8)
PROTHROM AB SERPL-ACNC: 16.4 SEC — HIGH (ref 9.95–12.87)
RBC # BLD: 2.67 M/UL — LOW (ref 4.7–6.1)
RBC # FLD: 13.2 % — SIGNIFICANT CHANGE UP (ref 11.5–14.5)
SODIUM SERPL-SCNC: 134 MMOL/L — LOW (ref 135–146)
WBC # BLD: 9.08 K/UL — SIGNIFICANT CHANGE UP (ref 4.8–10.8)
WBC # FLD AUTO: 9.08 K/UL — SIGNIFICANT CHANGE UP (ref 4.8–10.8)

## 2019-09-13 PROCEDURE — 71045 X-RAY EXAM CHEST 1 VIEW: CPT | Mod: 26

## 2019-09-13 PROCEDURE — 99233 SBSQ HOSP IP/OBS HIGH 50: CPT

## 2019-09-13 PROCEDURE — 93010 ELECTROCARDIOGRAM REPORT: CPT

## 2019-09-13 RX ORDER — KETOROLAC TROMETHAMINE 30 MG/ML
30 SYRINGE (ML) INJECTION ONCE
Refills: 0 | Status: DISCONTINUED | OUTPATIENT
Start: 2019-09-13 | End: 2019-09-13

## 2019-09-13 RX ORDER — SODIUM CHLORIDE 9 MG/ML
1000 INJECTION, SOLUTION INTRAVENOUS
Refills: 0 | Status: DISCONTINUED | OUTPATIENT
Start: 2019-09-13 | End: 2019-09-17

## 2019-09-13 RX ORDER — INSULIN LISPRO 100/ML
VIAL (ML) SUBCUTANEOUS
Refills: 0 | Status: DISCONTINUED | OUTPATIENT
Start: 2019-09-13 | End: 2019-09-17

## 2019-09-13 RX ORDER — DEXTROSE 50 % IN WATER 50 %
15 SYRINGE (ML) INTRAVENOUS ONCE
Refills: 0 | Status: DISCONTINUED | OUTPATIENT
Start: 2019-09-13 | End: 2019-09-17

## 2019-09-13 RX ORDER — GLUCAGON INJECTION, SOLUTION 0.5 MG/.1ML
1 INJECTION, SOLUTION SUBCUTANEOUS ONCE
Refills: 0 | Status: DISCONTINUED | OUTPATIENT
Start: 2019-09-13 | End: 2019-09-17

## 2019-09-13 RX ORDER — FUROSEMIDE 40 MG
40 TABLET ORAL ONCE
Refills: 0 | Status: COMPLETED | OUTPATIENT
Start: 2019-09-13 | End: 2019-09-13

## 2019-09-13 RX ORDER — SIMETHICONE 80 MG/1
80 TABLET, CHEWABLE ORAL
Refills: 0 | Status: DISCONTINUED | OUTPATIENT
Start: 2019-09-13 | End: 2019-09-17

## 2019-09-13 RX ORDER — INSULIN LISPRO 100/ML
5 VIAL (ML) SUBCUTANEOUS
Refills: 0 | Status: DISCONTINUED | OUTPATIENT
Start: 2019-09-13 | End: 2019-09-16

## 2019-09-13 RX ORDER — INSULIN GLARGINE 100 [IU]/ML
16 INJECTION, SOLUTION SUBCUTANEOUS EVERY MORNING
Refills: 0 | Status: DISCONTINUED | OUTPATIENT
Start: 2019-09-13 | End: 2019-09-16

## 2019-09-13 RX ADMIN — Medication 30 MILLIGRAM(S): at 06:03

## 2019-09-13 RX ADMIN — Medication 1: at 11:53

## 2019-09-13 RX ADMIN — HEPARIN SODIUM 5000 UNIT(S): 5000 INJECTION INTRAVENOUS; SUBCUTANEOUS at 17:54

## 2019-09-13 RX ADMIN — ISOSORBIDE DINITRATE 10 MILLIGRAM(S): 5 TABLET ORAL at 06:03

## 2019-09-13 RX ADMIN — Medication 600 MILLIGRAM(S): at 05:03

## 2019-09-13 RX ADMIN — SIMETHICONE 80 MILLIGRAM(S): 80 TABLET, CHEWABLE ORAL at 11:21

## 2019-09-13 RX ADMIN — POLYETHYLENE GLYCOL 3350 17 GRAM(S): 17 POWDER, FOR SOLUTION ORAL at 11:21

## 2019-09-13 RX ADMIN — HEPARIN SODIUM 5000 UNIT(S): 5000 INJECTION INTRAVENOUS; SUBCUTANEOUS at 05:03

## 2019-09-13 RX ADMIN — Medication 30 MILLIGRAM(S): at 11:21

## 2019-09-13 RX ADMIN — Medication 81 MILLIGRAM(S): at 11:20

## 2019-09-13 RX ADMIN — Medication 100 GRAM(S): at 05:03

## 2019-09-13 RX ADMIN — Medication 100 MILLIGRAM(S): at 05:03

## 2019-09-13 RX ADMIN — Medication 30 MILLIGRAM(S): at 11:36

## 2019-09-13 RX ADMIN — Medication 5 UNIT(S): at 11:53

## 2019-09-13 RX ADMIN — INSULIN GLARGINE 16 UNIT(S): 100 INJECTION, SOLUTION SUBCUTANEOUS at 12:01

## 2019-09-13 RX ADMIN — Medication 100 MILLIGRAM(S): at 21:15

## 2019-09-13 RX ADMIN — Medication 100 GRAM(S): at 17:53

## 2019-09-13 RX ADMIN — PANTOPRAZOLE SODIUM 40 MILLIGRAM(S): 20 TABLET, DELAYED RELEASE ORAL at 06:03

## 2019-09-13 RX ADMIN — OXYCODONE HYDROCHLORIDE 10 MILLIGRAM(S): 5 TABLET ORAL at 05:00

## 2019-09-13 RX ADMIN — Medication 5 UNIT(S): at 15:37

## 2019-09-13 RX ADMIN — Medication 30 MILLIGRAM(S): at 17:54

## 2019-09-13 RX ADMIN — Medication 0.6 MILLIGRAM(S): at 05:03

## 2019-09-13 RX ADMIN — OXYCODONE HYDROCHLORIDE 10 MILLIGRAM(S): 5 TABLET ORAL at 06:03

## 2019-09-13 RX ADMIN — OXYCODONE HYDROCHLORIDE 10 MILLIGRAM(S): 5 TABLET ORAL at 02:00

## 2019-09-13 RX ADMIN — OXYCODONE HYDROCHLORIDE 10 MILLIGRAM(S): 5 TABLET ORAL at 01:06

## 2019-09-13 RX ADMIN — SIMETHICONE 80 MILLIGRAM(S): 80 TABLET, CHEWABLE ORAL at 21:15

## 2019-09-13 RX ADMIN — Medication 1: at 15:38

## 2019-09-13 RX ADMIN — CLOPIDOGREL BISULFATE 75 MILLIGRAM(S): 75 TABLET, FILM COATED ORAL at 11:20

## 2019-09-13 RX ADMIN — Medication 30 MILLIGRAM(S): at 23:20

## 2019-09-13 RX ADMIN — Medication 600 MILLIGRAM(S): at 17:54

## 2019-09-13 RX ADMIN — OXYCODONE HYDROCHLORIDE 10 MILLIGRAM(S): 5 TABLET ORAL at 15:33

## 2019-09-13 RX ADMIN — Medication 40 MILLIGRAM(S): at 08:55

## 2019-09-13 RX ADMIN — Medication 100 MILLIGRAM(S): at 14:34

## 2019-09-13 NOTE — PROGRESS NOTE ADULT - SUBJECTIVE AND OBJECTIVE BOX
OPERATIVE PROCEDURE(s):                POD #                       48yMale  SURGEON(s): VIDHI Roldan  SUBJECTIVE ASSESSMENT:   Vital Signs Last 24 Hrs  T(F): 100.1 (13 Sep 2019 04:00), Max: 100.3 (13 Sep 2019 00:00)  HR: 92 (13 Sep 2019 07:00) (92 - 107)  BP: 104/56 (13 Sep 2019 07:00) (100/55 - 120/58)  BP(mean): 73 (13 Sep 2019 07:00) (69 - 90)  ABP: 125/70 (12 Sep 2019 08:00) (125/70 - 125/70)  ABP(mean): 87 (12 Sep 2019 08:00)  RR: 11 (13 Sep 2019 07:00) (11 - 29)  SpO2: 99% (13 Sep 2019 07:00) (96% - 100%)  CVP(mm Hg): 10 (12 Sep 2019 08:15)  CVP(cm H2O): --  CO: 8 (12 Sep 2019 08:00)  CI: 4 (12 Sep 2019 08:00)  PA: 26/10 (12 Sep 2019 08:15)  SVR: 709 (12 Sep 2019 08:00)    I&O's Detail    12 Sep 2019 07:01  -  13 Sep 2019 07:00  --------------------------------------------------------  IN:    insulin regular Infusion: 64 mL    IV PiggyBack: 100 mL    milrinone  Infusion: 9.6 mL    milrinone  Infusion: 117.3 mL    nitroglycerin  Infusion: 40 mL    Oral Fluid: 360 mL    sodium chloride 0.9%.: 240 mL  Total IN: 930.9 mL    OUT:    Indwelling Catheter - Urethral: 2839 mL  Total OUT: 2839 mL        Net:   I&O's Detail    11 Sep 2019 07:01  -  12 Sep 2019 07:00  --------------------------------------------------------  Total NET: -1803 mL      12 Sep 2019 07:01  -  13 Sep 2019 07:00  --------------------------------------------------------  Total NET: -1908.1 mL        CAPILLARY BLOOD GLUCOSE  121 (13 Sep 2019 07:00)  119 (12 Sep 2019 22:00)  116 (12 Sep 2019 20:00)  139 (12 Sep 2019 18:00)  131 (12 Sep 2019 16:00)  155 (12 Sep 2019 14:00)  130 (12 Sep 2019 12:00)  110 (12 Sep 2019 10:00)  125 (12 Sep 2019 08:00)      POCT Blood Glucose.: 121 mg/dL (13 Sep 2019 06:58)  POCT Blood Glucose.: 117 mg/dL (13 Sep 2019 05:20)  POCT Blood Glucose.: 119 mg/dL (13 Sep 2019 02:24)  POCT Blood Glucose.: 119 mg/dL (12 Sep 2019 22:08)  POCT Blood Glucose.: 116 mg/dL (12 Sep 2019 19:59)  POCT Blood Glucose.: 139 mg/dL (12 Sep 2019 17:56)  POCT Blood Glucose.: 131 mg/dL (12 Sep 2019 15:55)  POCT Blood Glucose.: 155 mg/dL (12 Sep 2019 13:50)  POCT Blood Glucose.: 130 mg/dL (12 Sep 2019 12:14)  POCT Blood Glucose.: 110 mg/dL (12 Sep 2019 10:00)  POCT Blood Glucose.: 125 mg/dL (12 Sep 2019 08:00)    Physical Exam:  General: NAD; A&Ox3/Patient is intubated and sedated  Cardiac: S1/S2, RRR, no murmur, no rubs  Lungs: unlabored respirations, CTA b/l, no wheeze, no rales, no crackles  Abdomen: Soft/NT/ND; positive bowel sounds x 4  Sternum: Intact, no click, incision healing well with no drainage  Incisions: Incisions clean/dry/intact  Extremities: No edema b/l lower extremities; good capillary refill; no cyanosis; palpable 1+ pedal pulses b/l    Central Venous Catheter: Yes[]  No[] , If Yes indication:           Day #  Lane Catheter: Yes  [] , No  [] , If yes indication:                      Day #  NGT: Yes [] No [] ,    If Yes Placement:                                     Day #  EPICARDIAL WIRES:  [] YES [] NO                                              Day #  BOWEL MOVEMENT:  [] YES [] NO, If No, Timing since last BM:      Day #  CHEST TUBE(Left/Right):  [] YES [] NO, If yes -  AIR LEAKS:  [] YES [] NO        LABS:                        7.9<L>  9.08  )-----------( 112<L>    ( 13 Sep 2019 03:35 )             23.3<L>                        8.4<L>  9.84  )-----------( 94<L>    ( 12 Sep 2019 12:27 )             24.9<L>    09-13    134<L>  |  97<L>  |  18  ----------------------------<  109<H>  4.0   |  23  |  0.8  09-12    136  |  99  |  17  ----------------------------<  131<H>  4.2   |  23  |  0.9    Ca    8.7      13 Sep 2019 03:35  Mg     2.2     09-13    TPro  6.5 [6.0 - 8.0]  /  Alb  4.4 [3.5 - 5.2]  /  TBili  0.9 [0.2 - 1.2]  /  DBili  x   /  AST  242<H> [0 - 41]  /  ALT  525<H> [0 - 41]  /  AlkPhos  43 [30 - 115]  09-13    PT/INR - ( 13 Sep 2019 03:35 )   PT: ;   INR: 1.43 ratio         PT/INR - ( 12 Sep 2019 04:00 )   PT: ;   INR: 1.55 ratio         PTT - ( 13 Sep 2019 03:35 )  PTT:26.8 sec, PTT - ( 12 Sep 2019 04:00 )  PTT:33.9 sec    ABG - ( 12 Sep 2019 04:31 )  pH: 7.44  /  pCO2: 35    /  pO2: 108   / HCO3: 24    / Base Excess: -0.1  /  SaO2: 98    /  LA: 0.9              RADIOLOGY & ADDITIONAL TESTS:  CXR:  EKG:  MEDICATIONS  (STANDING):  abacavir 600 mG/dolutegravir 50 mG/lamiVUDine 300 mG 1 Tablet(s) Oral daily  aspirin enteric coated 81 milliGRAM(s) Oral daily  clopidogrel Tablet 75 milliGRAM(s) Oral daily  colchicine 0.6 milliGRAM(s) Oral two times a day  dextrose 50% Injectable 50 milliLiter(s) IV Push every 15 minutes  diltiazem    Tablet 30 milliGRAM(s) Oral every 6 hours  docusate sodium 100 milliGRAM(s) Oral three times a day  furosemide   Injectable 40 milliGRAM(s) IV Push once  guaiFENesin  milliGRAM(s) Oral every 12 hours  heparin  Injectable 5000 Unit(s) SubCutaneous two times a day  insulin regular Infusion 1 Unit(s)/Hr (1 mL/Hr) IV Continuous <Continuous>  ipratropium 17 MICROgram(s) HFA Inhaler 2 Puff(s) Inhalation every 6 hours  magnesium sulfate  IVPB 1 Gram(s) IV Intermittent every 12 hours  pantoprazole    Tablet 40 milliGRAM(s) Oral before breakfast  polyethylene glycol 3350 17 Gram(s) Oral daily  sodium chloride 0.9%. 1000 milliLiter(s) (20 mL/Hr) IV Continuous <Continuous>    MEDICATIONS  (PRN):  oxyCODONE    IR 10 milliGRAM(s) Oral every 4 hours PRN Severe Pain (7 - 10)  oxyCODONE    IR 5 milliGRAM(s) Oral every 4 hours PRN Moderate Pain (4 - 6)    HEPARIN:  [] YES [] NO  Dose: XX UNITS/HR UNITS Q8H  LOVENOX:[] YES [] NO  Dose: XX mg Q24H  COUMADIN: []  YES [] NO  Dose: XX mg  Q24H  SCD's: YES b/l  GI Prophylaxis: Protonix [], Pepcid [], None [], (Contra-indication:.....)    Post-Op Beta-Blockers: Yes [], No[], If No, then contraindication:  Post-Op Aspirin: Yes [],  No [], If No, then contraindication:  Post-Op Statin: Yes [], No[], If No, then contraindication:  Allergies    No Known Allergies    Intolerances      Ambulation/Activity Status:    Assessment/Plan:  48y Male status-post .....  - Case and plan discussed with CTU Intensivist and CT Surgeon - Dr. Roldan/Lata/Patrizia   - Continue CTU supportive care    - Continue DVT/GI prophylaxis  - Incentive Spirometry 10 times an hour  - Continue to advance physical activity as tolerated and continue PT/OT as directed  1. CAD: Continue ASA, statin, BB  2. HTN:   3. A. Fib:   4. COPD/Hypoxia:   5. DM/Glucose Control:     Social Service Disposition: OPERATIVE PROCEDURE(s):   CABGx4, L radial             POD #  3                     48yMale  SURGEON(s): VIDHI Roldan  SUBJECTIVE ASSESSMENT: pt seen and examined. pt doing well, no acute complaints   Vital Signs Last 24 Hrs  T(F): 100.1 (13 Sep 2019 04:00), Max: 100.3 (13 Sep 2019 00:00)  HR: 92 (13 Sep 2019 07:00) (92 - 107)  BP: 104/56 (13 Sep 2019 07:00) (100/55 - 120/58)  BP(mean): 73 (13 Sep 2019 07:00) (69 - 90)  ABP: 125/70 (12 Sep 2019 08:00) (125/70 - 125/70)  ABP(mean): 87 (12 Sep 2019 08:00)  RR: 11 (13 Sep 2019 07:00) (11 - 29)  SpO2: 99% (13 Sep 2019 07:00) (96% - 100%)  CVP(mm Hg): 10 (12 Sep 2019 08:15)  CO: 8 (12 Sep 2019 08:00)  CI: 4 (12 Sep 2019 08:00)  PA: 26/10 (12 Sep 2019 08:15)  SVR: 709 (12 Sep 2019 08:00)    I&O's Detail    12 Sep 2019 07:01  -  13 Sep 2019 07:00  --------------------------------------------------------  IN:    insulin regular Infusion: 64 mL    IV PiggyBack: 100 mL    milrinone  Infusion: 9.6 mL    milrinone  Infusion: 117.3 mL    nitroglycerin  Infusion: 40 mL    Oral Fluid: 360 mL    sodium chloride 0.9%.: 240 mL  Total IN: 930.9 mL    OUT:    Indwelling Catheter - Urethral: 2839 mL  Total OUT: 2839 mL        Net:   I&O's Detail    11 Sep 2019 07:01  -  12 Sep 2019 07:00  --------------------------------------------------------  Total NET: -1803 mL      12 Sep 2019 07:01  -  13 Sep 2019 07:00  --------------------------------------------------------  Total NET: -1908.1 mL        CAPILLARY BLOOD GLUCOSE  121 (13 Sep 2019 07:00)  119 (12 Sep 2019 22:00)  116 (12 Sep 2019 20:00)  139 (12 Sep 2019 18:00)  131 (12 Sep 2019 16:00)  155 (12 Sep 2019 14:00)  130 (12 Sep 2019 12:00)  110 (12 Sep 2019 10:00)  125 (12 Sep 2019 08:00)      POCT Blood Glucose.: 121 mg/dL (13 Sep 2019 06:58)  POCT Blood Glucose.: 117 mg/dL (13 Sep 2019 05:20)  POCT Blood Glucose.: 119 mg/dL (13 Sep 2019 02:24)  POCT Blood Glucose.: 119 mg/dL (12 Sep 2019 22:08)  POCT Blood Glucose.: 116 mg/dL (12 Sep 2019 19:59)  POCT Blood Glucose.: 139 mg/dL (12 Sep 2019 17:56)  POCT Blood Glucose.: 131 mg/dL (12 Sep 2019 15:55)  POCT Blood Glucose.: 155 mg/dL (12 Sep 2019 13:50)  POCT Blood Glucose.: 130 mg/dL (12 Sep 2019 12:14)  POCT Blood Glucose.: 110 mg/dL (12 Sep 2019 10:00)  POCT Blood Glucose.: 125 mg/dL (12 Sep 2019 08:00)    Physical Exam:  General: NAD; A&Ox3  Cardiac: S1/S2, RRR, no murmur, no rubs  Lungs: decreased bs at bases bilaterally  Abdomen: Soft/NT/ND; positive bowel sounds x 4  Sternum: Intact, no click, incision healing well with no drainage  Incisions: Incisions clean/dry/intact  Extremities: 1+ edema b/l lower extremities; good capillary refill; no cyanosis; palpable 1+ pedal pulses b/l    Central Venous Catheter: Yes[x]  No[] , If Yes indication:   hd unstable        Day #3  Lane Catheter: Yes  [x] , No  [] , If yes indication:         strict i/o             Day # 3  NGT: Yes [] No [x] ,    If Yes Placement:                                     Day #  EPICARDIAL WIRES:  [x] YES [] NO                                              Day #3  BOWEL MOVEMENT:  [] YES [x] NO, If No, Timing since last BM:      Day #  CHEST TUBE(Left/Right):  [] YES [x] NO, If yes -  AIR LEAKS:  [] YES [] NO        LABS:                        7.9<L>  9.08  )-----------( 112<L>    ( 13 Sep 2019 03:35 )             23.3<L>                        8.4<L>  9.84  )-----------( 94<L>    ( 12 Sep 2019 12:27 )             24.9<L>    09-13    134<L>  |  97<L>  |  18  ----------------------------<  109<H>  4.0   |  23  |  0.8  09-12    136  |  99  |  17  ----------------------------<  131<H>  4.2   |  23  |  0.9    Ca    8.7      13 Sep 2019 03:35  Mg     2.2     09-13    TPro  6.5 [6.0 - 8.0]  /  Alb  4.4 [3.5 - 5.2]  /  TBili  0.9 [0.2 - 1.2]  /  DBili  x   /  AST  242<H> [0 - 41]  /  ALT  525<H> [0 - 41]  /  AlkPhos  43 [30 - 115]  09-13    PT/INR - ( 13 Sep 2019 03:35 )   PT: ;   INR: 1.43 ratio         PT/INR - ( 12 Sep 2019 04:00 )   PT: ;   INR: 1.55 ratio         PTT - ( 13 Sep 2019 03:35 )  PTT:26.8 sec, PTT - ( 12 Sep 2019 04:00 )  PTT:33.9 sec    ABG - ( 12 Sep 2019 04:31 )  pH: 7.44  /  pCO2: 35    /  pO2: 108   / HCO3: 24    / Base Excess: -0.1  /  SaO2: 98    /  LA: 0.9              RADIOLOGY & ADDITIONAL TESTS:  CXR: < from: Xray Chest 1 View- PORTABLE-Routine (09.13.19 @ 05:37) >    Impression:      Stable bibasilar opacities. No pneumothorax.    < end of copied text >    EKG:   MEDICATIONS  (STANDING):  abacavir 600 mG/dolutegravir 50 mG/lamiVUDine 300 mG 1 Tablet(s) Oral daily  aspirin enteric coated 81 milliGRAM(s) Oral daily  clopidogrel Tablet 75 milliGRAM(s) Oral daily  colchicine 0.6 milliGRAM(s) Oral two times a day  dextrose 50% Injectable 50 milliLiter(s) IV Push every 15 minutes  diltiazem    Tablet 30 milliGRAM(s) Oral every 6 hours  docusate sodium 100 milliGRAM(s) Oral three times a day  furosemide   Injectable 40 milliGRAM(s) IV Push once  guaiFENesin  milliGRAM(s) Oral every 12 hours  heparin  Injectable 5000 Unit(s) SubCutaneous two times a day  insulin regular Infusion 1 Unit(s)/Hr (1 mL/Hr) IV Continuous <Continuous>  ipratropium 17 MICROgram(s) HFA Inhaler 2 Puff(s) Inhalation every 6 hours  magnesium sulfate  IVPB 1 Gram(s) IV Intermittent every 12 hours  pantoprazole    Tablet 40 milliGRAM(s) Oral before breakfast  polyethylene glycol 3350 17 Gram(s) Oral daily  sodium chloride 0.9%. 1000 milliLiter(s) (20 mL/Hr) IV Continuous <Continuous>    MEDICATIONS  (PRN):  oxyCODONE    IR 10 milliGRAM(s) Oral every 4 hours PRN Severe Pain (7 - 10)  oxyCODONE    IR 5 milliGRAM(s) Oral every 4 hours PRN Moderate Pain (4 - 6)    HEPARIN:  [x] YES [] NO  Dose: 5000 UNITS Q8H  LOVENOX:[] YES [x] NO  Dose: XX mg Q24H  COUMADIN: []  YES [x] NO  Dose: XX mg  Q24H  SCD's: YES b/l  GI Prophylaxis: Protonix [x], Pepcid [], None [], (Contra-indication:.....)    Post-Op Beta-Blockers: Yes [], No[x], If No, then contraindication: hypotensive   Post-Op Aspirin: Yes [x],  No [], If No, then contraindication:  Post-Op Statin: Yes [], No[x], If No, then contraindication: elevated lfts   Allergies    No Known Allergies    Intolerances      Ambulation/Activity Status: ambulate     Assessment/Plan:  48y Male status-post CABGx4, L radial POD#3  - Case and plan discussed with CTU Intensivist and CT Surgeon - Dr. Roldan/Lata/Patrizia   - Continue CTU supportive care    - Continue DVT/GI prophylaxis  - Incentive Spirometry 10 times an hour  - Continue to advance physical activity as tolerated and continue PT/OT as directed  1. CAD: Continue ASA, no statin due to elevated lfts, no bb due to hypotension after bb, cont cardizem to prevent radial vasospasm  2. HTN: cont cardizem for now, d/c bb for hypotension  3. A. Fib: cont cardizem, mag 2gm bid  4. COPD/Hypoxia:  cont nebs, mucinex, lasix for fluid overload   5. DM/Glucose Control: d/c insulin gtt start lantus 16 with humalog 5 and sliding scale     Social Service Disposition:  home

## 2019-09-13 NOTE — PROGRESS NOTE ADULT - SUBJECTIVE AND OBJECTIVE BOX
49 yo M with HIV on ART, DM (HgA1C 6.3), DLD, recent cavity filling 8/26, presents to the ED for worsened left lower molar pain for 1 week, found to have ST elevations V2-3 and elevated CE, fever. Of note did not c/o chest pain at any point. Recent EKG/Stress test wnl. Cardiac Cath with multivessel disease.     POD # 3 - CABG x 4, L- radial. EF 25%  Currently on  Primacor at 0.2 gtt and 2 liter NC    Vital Signs Last 24 Hrs  T(C): 37.8 (13 Sep 2019 04:00), Max: 37.9 (13 Sep 2019 00:00)  T(F): 100.1 (13 Sep 2019 04:00), Max: 100.3 (13 Sep 2019 00:00)  HR: 92 (13 Sep 2019 07:00) (92 - 107)  BP: 104/56 (13 Sep 2019 07:00) (100/55 - 120/58)  BP(mean): 73 (13 Sep 2019 07:00) (69 - 90)  RR: 11 (13 Sep 2019 07:00) (11 - 29)  SpO2: 99% (13 Sep 2019 07:00) (96% - 100%)    alert, awake, verbal  follows commands appropriately  Right IJ cordis   no JVD  S1S2 reg rate  b/l air entry, diminished effort  soft abdomen, non tender, non distended  warm periphery with + distal pulses, non pitting edema b/l  power 5/5 b/l in all 4 ext    CXR - poor insp effort, gastric bubble, left base opacity    INR 1.4  Cr 0.6, BUN 18, AST//525  WBC 9.3, Hg 7.9, plt 112    a/p:    CAD s/p CABG x4, L radial harvest  Congestive hepatopathy vs HAART meds  Acute Hypoxic resp failure  HTN  Acute post thoracotomy Pain  DM  Tooth pain s/p extraction    PO ASA 81 mg, Plavix and Heparin SC resumed. Trend plts.  Stop milrinone gtt. Stop colchicine and stop isordil  Lasix 40 mg IV x1. Goal is negative fluid balance. Trend lytes.   PO CCB- Cardizem at 30 q6  No beta blocker PO CCB as per request of CT surgery attending.  Trend LFTs - Hold today's HAART regimen  finished course of Unasyn at 9/10.  Glycemic control  Daily PT. Ambulation

## 2019-09-13 NOTE — DISCHARGE NOTE NURSING/CASE MANAGEMENT/SOCIAL WORK - NSDCFUADDAPPT_GEN_ALL_CORE_FT
Please follow up with Dr. Roldan on 9/20/2019 @ 1:30pm  Thoracic and Cardiac Surgery  [address] [place] [address], [place] [address]  Phone: [phone]

## 2019-09-13 NOTE — DISCHARGE NOTE NURSING/CASE MANAGEMENT/SOCIAL WORK - PATIENT PORTAL LINK FT
You can access the FollowMyHealth Patient Portal offered by [facility] by registering at the following website: [email or web address]. By joining [employer]’s FollowMyHealth portal, you will also be able to view your health information using other applications (apps) compatible with our system.

## 2019-09-14 LAB
ALBUMIN SERPL ELPH-MCNC: 4.4 G/DL — SIGNIFICANT CHANGE UP (ref 3.5–5.2)
ALP SERPL-CCNC: 56 U/L — SIGNIFICANT CHANGE UP (ref 30–115)
ALT FLD-CCNC: 404 U/L — HIGH (ref 0–41)
ANION GAP SERPL CALC-SCNC: 13 MMOL/L — SIGNIFICANT CHANGE UP (ref 7–14)
AST SERPL-CCNC: 119 U/L — HIGH (ref 0–41)
BILIRUB SERPL-MCNC: 1.1 MG/DL — SIGNIFICANT CHANGE UP (ref 0.2–1.2)
BUN SERPL-MCNC: 19 MG/DL — SIGNIFICANT CHANGE UP (ref 10–20)
CALCIUM SERPL-MCNC: 8.5 MG/DL — SIGNIFICANT CHANGE UP (ref 8.5–10.1)
CHLORIDE SERPL-SCNC: 96 MMOL/L — LOW (ref 98–110)
CO2 SERPL-SCNC: 24 MMOL/L — SIGNIFICANT CHANGE UP (ref 17–32)
CREAT SERPL-MCNC: 0.7 MG/DL — SIGNIFICANT CHANGE UP (ref 0.7–1.5)
GLUCOSE BLDC GLUCOMTR-MCNC: 104 MG/DL — HIGH (ref 70–99)
GLUCOSE BLDC GLUCOMTR-MCNC: 115 MG/DL — HIGH (ref 70–99)
GLUCOSE BLDC GLUCOMTR-MCNC: 123 MG/DL — HIGH (ref 70–99)
GLUCOSE BLDC GLUCOMTR-MCNC: 142 MG/DL — HIGH (ref 70–99)
GLUCOSE SERPL-MCNC: 123 MG/DL — HIGH (ref 70–99)
HCT VFR BLD CALC: 24 % — LOW (ref 42–52)
HGB BLD-MCNC: 7.9 G/DL — LOW (ref 14–18)
INR BLD: 1.4 RATIO — HIGH (ref 0.65–1.3)
MAGNESIUM SERPL-MCNC: 2 MG/DL — SIGNIFICANT CHANGE UP (ref 1.8–2.4)
MCHC RBC-ENTMCNC: 28.6 PG — SIGNIFICANT CHANGE UP (ref 27–31)
MCHC RBC-ENTMCNC: 32.9 G/DL — SIGNIFICANT CHANGE UP (ref 32–37)
MCV RBC AUTO: 87 FL — SIGNIFICANT CHANGE UP (ref 80–94)
NRBC # BLD: 0 /100 WBCS — SIGNIFICANT CHANGE UP (ref 0–0)
PLATELET # BLD AUTO: 129 K/UL — LOW (ref 130–400)
POTASSIUM SERPL-MCNC: 4 MMOL/L — SIGNIFICANT CHANGE UP (ref 3.5–5)
POTASSIUM SERPL-SCNC: 4 MMOL/L — SIGNIFICANT CHANGE UP (ref 3.5–5)
PROT SERPL-MCNC: 6.5 G/DL — SIGNIFICANT CHANGE UP (ref 6–8)
PROTHROM AB SERPL-ACNC: 16.1 SEC — HIGH (ref 9.95–12.87)
RBC # BLD: 2.76 M/UL — LOW (ref 4.7–6.1)
RBC # FLD: 13.3 % — SIGNIFICANT CHANGE UP (ref 11.5–14.5)
SODIUM SERPL-SCNC: 133 MMOL/L — LOW (ref 135–146)
WBC # BLD: 7.44 K/UL — SIGNIFICANT CHANGE UP (ref 4.8–10.8)
WBC # FLD AUTO: 7.44 K/UL — SIGNIFICANT CHANGE UP (ref 4.8–10.8)

## 2019-09-14 PROCEDURE — 99233 SBSQ HOSP IP/OBS HIGH 50: CPT

## 2019-09-14 PROCEDURE — 71045 X-RAY EXAM CHEST 1 VIEW: CPT | Mod: 26

## 2019-09-14 RX ORDER — DILTIAZEM HCL 120 MG
30 CAPSULE, EXT RELEASE 24 HR ORAL ONCE
Refills: 0 | Status: COMPLETED | OUTPATIENT
Start: 2019-09-14 | End: 2019-09-14

## 2019-09-14 RX ORDER — FENTANYL CITRATE 50 UG/ML
50 INJECTION INTRAVENOUS ONCE
Refills: 0 | Status: DISCONTINUED | OUTPATIENT
Start: 2019-09-14 | End: 2019-09-14

## 2019-09-14 RX ORDER — DILTIAZEM HCL 120 MG
60 CAPSULE, EXT RELEASE 24 HR ORAL EVERY 8 HOURS
Refills: 0 | Status: DISCONTINUED | OUTPATIENT
Start: 2019-09-14 | End: 2019-09-16

## 2019-09-14 RX ORDER — FUROSEMIDE 40 MG
20 TABLET ORAL ONCE
Refills: 0 | Status: COMPLETED | OUTPATIENT
Start: 2019-09-14 | End: 2019-09-14

## 2019-09-14 RX ADMIN — SIMETHICONE 80 MILLIGRAM(S): 80 TABLET, CHEWABLE ORAL at 05:31

## 2019-09-14 RX ADMIN — Medication 81 MILLIGRAM(S): at 11:43

## 2019-09-14 RX ADMIN — CLOPIDOGREL BISULFATE 75 MILLIGRAM(S): 75 TABLET, FILM COATED ORAL at 11:43

## 2019-09-14 RX ADMIN — Medication 30 MILLIGRAM(S): at 11:43

## 2019-09-14 RX ADMIN — HEPARIN SODIUM 5000 UNIT(S): 5000 INJECTION INTRAVENOUS; SUBCUTANEOUS at 17:29

## 2019-09-14 RX ADMIN — HEPARIN SODIUM 5000 UNIT(S): 5000 INJECTION INTRAVENOUS; SUBCUTANEOUS at 05:31

## 2019-09-14 RX ADMIN — OXYCODONE HYDROCHLORIDE 10 MILLIGRAM(S): 5 TABLET ORAL at 11:55

## 2019-09-14 RX ADMIN — Medication 5 UNIT(S): at 07:33

## 2019-09-14 RX ADMIN — Medication 600 MILLIGRAM(S): at 17:29

## 2019-09-14 RX ADMIN — Medication 10 MILLIGRAM(S): at 09:29

## 2019-09-14 RX ADMIN — Medication 5 UNIT(S): at 11:32

## 2019-09-14 RX ADMIN — INSULIN GLARGINE 16 UNIT(S): 100 INJECTION, SOLUTION SUBCUTANEOUS at 07:40

## 2019-09-14 RX ADMIN — Medication 20 MILLIGRAM(S): at 09:29

## 2019-09-14 RX ADMIN — PANTOPRAZOLE SODIUM 40 MILLIGRAM(S): 20 TABLET, DELAYED RELEASE ORAL at 06:00

## 2019-09-14 RX ADMIN — POLYETHYLENE GLYCOL 3350 17 GRAM(S): 17 POWDER, FOR SOLUTION ORAL at 11:44

## 2019-09-14 RX ADMIN — Medication 100 GRAM(S): at 06:00

## 2019-09-14 RX ADMIN — Medication 30 MILLIGRAM(S): at 05:31

## 2019-09-14 RX ADMIN — Medication 60 MILLIGRAM(S): at 14:34

## 2019-09-14 RX ADMIN — OXYCODONE HYDROCHLORIDE 10 MILLIGRAM(S): 5 TABLET ORAL at 12:20

## 2019-09-14 RX ADMIN — Medication 100 MILLIGRAM(S): at 05:31

## 2019-09-14 RX ADMIN — Medication 100 GRAM(S): at 17:31

## 2019-09-14 RX ADMIN — Medication 60 MILLIGRAM(S): at 21:48

## 2019-09-14 RX ADMIN — Medication 600 MILLIGRAM(S): at 05:31

## 2019-09-14 RX ADMIN — Medication 100 MILLIGRAM(S): at 21:48

## 2019-09-14 RX ADMIN — Medication 5 UNIT(S): at 16:00

## 2019-09-14 RX ADMIN — Medication 100 MILLIGRAM(S): at 14:27

## 2019-09-14 RX ADMIN — FENTANYL CITRATE 50 MICROGRAM(S): 50 INJECTION INTRAVENOUS at 01:00

## 2019-09-14 RX ADMIN — SIMETHICONE 80 MILLIGRAM(S): 80 TABLET, CHEWABLE ORAL at 18:25

## 2019-09-14 NOTE — PROGRESS NOTE ADULT - SUBJECTIVE AND OBJECTIVE BOX
CTU Attending Progress Daily Note     14 Sep 2019 12:01  Admited 09-01-19, Hospital Day 13d  POD# - 4    HPI:  49 y/o M with PMH of HIV (on treatment), DM, DLD, presents to the ED for left tooth pain for 1 week. He says the pain is left sided and radiates up to his head. It is shock-like and pulsatile in nature. Originates at angle of jaw. Made worse by chewing food and palpation of outside of his jaw. He was recently seen by dentist and had a filling placed and was told he would need to have his left wisdom teeth extracted. He has an appointment scheduled with them for Tuesday. He had a fever at home to up to 105 since last night. He took motrin 800mg 10 am and tylenol 500mg 3 pm with minimal relieft of his pain. He went to a walk in clinic where he received amoxicillin and told to come to the ED. He denies changes in vision or hearing, denies neck pain or cough, no SOB or dysphagia/odynophagia. He denies chest pain, SOB, palpitations, abdominal pain, nausea/vomiting/constipation, urinary symptoms or lower extremity swelling. He has HIV with 0 viral load and normal CD4, as per patient.     In ED, STEMI code was called for ST elevation on V1-V3. Stat enzymes were positive. Patient denied chest pain, palpitations, or SOB, and recently had a stress test and echo by Dr Griffin for abnormal EKG findings. He reports workup was negative. Seen by cardiology fellow, Dr Ken and upgrade to CCU approved by Dr Sloan. (02 Sep 2019 01:07)    Home Medications:  Janumet 50 mg-1000 mg oral tablet: 1 tab(s) orally 2 times a day (02 Sep 2019 01:15)  omeprazole 40 mg oral delayed release capsule: 1 cap(s) orally once a day (02 Sep 2019 01:15)  rosuvastatin 20 mg oral tablet: 1 tab(s) orally once a day (02 Sep 2019 01:15)  Triumeq oral tablet: 1 tab(s) orally once a day (02 Sep 2019 01:15)    FAMILY HISTORY:  FH: diabetes mellitus: mother and father  FH: heart failure: mother    PAST MEDICAL & SURGICAL HISTORY:  Diabetes  Hypercholesteremia  HIV (human immunodeficiency virus infection)  History of appendectomy    Interval event for past 24 hr:  BRISEIDA SIMON  48y had no event.   Current Complains:  BRISEIDA SIMON has no new complains  Allergies    No Known Allergies    Intolerances      OBJECTIVE:  Vitals last 24 hrs  T(C): 37.1 (09-14-19 @ 07:00), Max: 37.2 (09-13-19 @ 20:00)  T(F): 98.7 (09-14-19 @ 07:00), Max: 99 (09-13-19 @ 20:00)  HR: 102 (09-14-19 @ 11:00) (88 - 103)  BP: 105/58 (09-14-19 @ 11:00) (93/52 - 125/58)  ABP: --  ABP(mean): --  RR: 18 (09-14-19 @ 11:00) (11 - 31)  SpO2: 98% (09-14-19 @ 11:00) (92% - 98%)      09-13-19 @ 07:01  -  09-14-19 @ 07:00  --------------------------------------------------------  IN:    Oral Fluid: 470 mL  Total IN: 470 mL    OUT:    Indwelling Catheter - Urethral: 952 mL    Voided: 1200 mL  Total OUT: 2152 mL    Total NET: -1682 mL          CAPILLARY BLOOD GLUCOSE      POCT Blood Glucose.: 115 mg/dL (14 Sep 2019 11:22)  POCT Blood Glucose.: 142 mg/dL (14 Sep 2019 06:46)  POCT Blood Glucose.: 171 mg/dL (13 Sep 2019 15:37)    LABS:                          7.9    7.44  )-----------( 129      ( 14 Sep 2019 01:45 )             24.0     Hemoglobin: 7.9 g/dL (09-14 @ 01:45)  Hemoglobin: 7.9 g/dL (09-13 @ 03:35)  Hemoglobin: 8.4 g/dL (09-12 @ 12:27)  Hemoglobin: 7.6 g/dL (09-12 @ 04:00)    09-14    133<L>  |  96<L>  |  19  ----------------------------<  123<H>  4.0   |  24  |  0.7    Ca    8.5      14 Sep 2019 01:45  Mg     2.0     09-14    TPro  6.5  /  Alb  4.4  /  TBili  1.1  /  DBili  x   /  AST  119<H>  /  ALT  404<H>  /  AlkPhos  56  09-14    Creatinine, Serum: 0.7 mg/dL (09-14 @ 01:45)  Creatinine, Serum: 0.8 mg/dL (09-13 @ 03:35)  Creatinine, Serum: 0.9 mg/dL (09-12 @ 12:27)  Creatinine, Serum: 0.8 mg/dL (09-12 @ 04:00)  Creatinine, Serum: 0.8 mg/dL (09-11 @ 20:14)  Creatinine, Serum: 0.9 mg/dL (09-11 @ 12:00)  Creatinine, Serum: 0.9 mg/dL (09-11 @ 00:00)  Creatinine, Serum: 0.9 mg/dL (09-10 @ 15:00)    PT/INR - ( 14 Sep 2019 01:45 )   PT: 16.10 sec;   INR: 1.40 ratio         PTT - ( 13 Sep 2019 03:35 )  PTT:26.8 sec      Culture - Blood (collected 12 Sep 2019 13:50)  Source: .Blood Blood  Preliminary Report (14 Sep 2019 01:01):    No growth to date.      HOSPITAL MEDICATIONS:  MEDICATIONS  (STANDING):  abacavir 600 mG/dolutegravir 50 mG/lamiVUDine 300 mG 1 Tablet(s) Oral daily  aspirin enteric coated 81 milliGRAM(s) Oral daily  clopidogrel Tablet 75 milliGRAM(s) Oral daily  dextrose 5%. 1000 milliLiter(s) (50 mL/Hr) IV Continuous <Continuous>  dextrose 50% Injectable 50 milliLiter(s) IV Push every 15 minutes  diltiazem    Tablet 60 milliGRAM(s) Oral every 8 hours  docusate sodium 100 milliGRAM(s) Oral three times a day  guaiFENesin  milliGRAM(s) Oral every 12 hours  heparin  Injectable 5000 Unit(s) SubCutaneous two times a day  insulin glargine Injectable (LANTUS) 16 Unit(s) SubCutaneous every morning  insulin lispro (HumaLOG) corrective regimen sliding scale   SubCutaneous three times a day before meals  insulin lispro Injectable (HumaLOG) 5 Unit(s) SubCutaneous three times a day before meals  ipratropium 17 MICROgram(s) HFA Inhaler 2 Puff(s) Inhalation every 6 hours  magnesium sulfate  IVPB 1 Gram(s) IV Intermittent every 12 hours  pantoprazole    Tablet 40 milliGRAM(s) Oral before breakfast  polyethylene glycol 3350 17 Gram(s) Oral daily  sodium chloride 0.9%. 1000 milliLiter(s) (20 mL/Hr) IV Continuous <Continuous>    MEDICATIONS  (PRN):  dextrose 40% Gel 15 Gram(s) Oral once PRN Blood Glucose LESS THAN 70 milliGRAM(s)/deciliter  glucagon  Injectable 1 milliGRAM(s) IntraMuscular once PRN Glucose LESS THAN 70 milligrams/deciliter  oxyCODONE    IR 10 milliGRAM(s) Oral every 4 hours PRN Severe Pain (7 - 10)  oxyCODONE    IR 5 milliGRAM(s) Oral every 4 hours PRN Moderate Pain (4 - 6)  simethicone 80 milliGRAM(s) Chew two times a day PRN Gas      REVIEW OF SYSTEMS:  CONSTITUTIONAL: [X] all negative; [ ] weakness, [ ] fevers, [ ] chills  EYES/ENT: [X] all negative; [ ] visual changes, [ ] vertigo, [ ] throat pain, [ ] eye pain  NECK: [X] all negative; [ ] pain, [ ] stiffness  RESPIRATORY: [ ] all negative, [x ] cough, [ ] wheezing, [ ] hemoptysis, [x ] shortness of breath  CARDIOVASCULAR: [ ] all negative; [x ] chest pain, [ ] palpitations, [ ] orthopnea  GASTROINTESTINAL: [X] all negative; [ ]abdominal pain, [ ] nausea, [ ] vomiting, [ ] hematemesis, [ ] diarrhea, [ ] constipation, [ ] melena, [ ] hematochezia.  GENITOURINARY: [X] all negative; [ ] dysuria, [ ] frequency, [ ] hematuria  NEUROLOGICAL: [X] all negative; [ ] numbness, [ ] weakness, [ ] paresthesias  MUSCULOSKELETAL: [X] all negative, [ ] joint pain, [ ] joint swelling, [ ] joint redness, [ ] bone pain  SKIN: [X] all negative; [ ] itching, [ ] burning, [ ] rashes, [ ] lesions   All other review of systems is negative unless indicated above.    [  ] Unable to assess ROS because     PHYSICAL EXAM:          CONSTITUTIONAL: Well-developed; well-nourished; in no acute distress.   	SKIN: warm, dry, no rashes or lesions  	HEENT: Atraumatic. Normocephalic. PERRL. Moist membranes, no conj injection, sclera clear  	NECK: Supple; non tender.  No JVD. No lymphadenopathy.  	CARD: Normal S1, S2. Rate and Rhythm are regular. No murmurs.  	RESP: Good air entry bilaterally, no wheezes, bibasilar rales no rhonchi.  	ABD: Soft, not tender, not distended, no CVA ttp no rebound no guarding, bowel sounds present  	EXT: Normal ROM.  No clubbing, no cyanosis, + pedal edema, no calf pain b/l, Peripheral pulses intact.  	LYMPH: No acute cervical adenopathy.  	NEURO: Alert, awake, motor 5/5 R, 5/5 L, sensation intact bilat, CN 2-12 intact,          PSYCH: Cooperative, appropriate. Alert & oriented x 3    RADIOLOGY:  X Reviewed and interpreted by me  CxR from 09-14-19 shows mild congestion, no pneumothorax, small left effusion, no cardiomegaly,       ECG:  X Reviewed and interpreted by me - NSR

## 2019-09-14 NOTE — PROGRESS NOTE ADULT - ASSESSMENT
PROBLEMS:  1.	cad - s/p CABG.   2.	chronic systolic CHF - lasix 20   3.	tachycardia - change cardizem to 60 q8  4.	anemia - observe  5.	transaminazemia - improving     PLAN  Neuro: move all 4 extremities. no sensory or motor deficits  Pain management.   oxyCODONE    IR 10 milliGRAM(s) Oral every 4 hours PRN  oxyCODONE    IR 5 milliGRAM(s) Oral every 4 hours PRN    Pulm: Wean off supplemental oxygen as able. Daily CXR. Encourage coughing, deep breathing and use of incentive spirometry.   guaiFENesin  milliGRAM(s) Oral every 12 hours  ipratropium 17 MICROgram(s) HFA Inhaler 2 Puff(s) Inhalation every 6 hours    Cardio: Monitor telemetry/alarms. Continue supportive care   diltiazem    Tablet 60 milliGRAM(s) Oral every 8 hours    GI: Continue stool softeners.    docusate sodium 100 milliGRAM(s) Oral three times a day  pantoprazole    Tablet 40 milliGRAM(s) Oral before breakfast  polyethylene glycol 3350 17 Gram(s) Oral daily  simethicone 80 milliGRAM(s) Chew two times a day PRN    Nutrition: Continue present diet  Endocrine and glucose control:   dextrose 40% Gel 15 Gram(s) Oral once PRN  dextrose 50% Injectable 50 milliLiter(s) IV Push every 15 minutes  glucagon  Injectable 1 milliGRAM(s) IntraMuscular once PRN  insulin glargine Injectable (LANTUS) 16 Unit(s) SubCutaneous every morning  insulin lispro (HumaLOG) corrective regimen sliding scale   SubCutaneous three times a day before meals  insulin lispro Injectable (HumaLOG) 5 Unit(s) SubCutaneous three times a day before meals    Renal: monitor urine output, supplement electrolytes as needed,     Vasc: Heparin SC and/or SCDs for DVT prophylaxis  aspirin enteric coated 81 milliGRAM(s) Oral daily  clopidogrel Tablet 75 milliGRAM(s) Oral daily  heparin  Injectable 5000 Unit(s) SubCutaneous two times a day    ID: Stable, no fever , no chills. Off antibiotics.  abacavir 600 mG/dolutegravir 50 mG/lamiVUDine 300 mG 1 Tablet(s) Oral daily    Therapy: OOB/ambulate  Disposition: start planing discharge home or placement    Pertinent clinical, laboratory, radiographic, hemodynamic, echocardiographic, respiratory data, microbiologic data and chart were reviewed and analyzed frequently throughout the course of the day and night. GI and DVT prophylaxis, glycemic control, head of bed elevation and skin care issues were addressed.  Patient seen, examined and plan discussed with CT Surgery / CTICU team during rounds.

## 2019-09-14 NOTE — PROGRESS NOTE ADULT - SUBJECTIVE AND OBJECTIVE BOX
OPERATIVE PROCEDURE(s):                POD #                       48yMale  SURGEON(s): VIDHI Roldan  SUBJECTIVE ASSESSMENT:   Vital Signs Last 24 Hrs  T(F): 98.7 (14 Sep 2019 07:00), Max: 99.8 (13 Sep 2019 11:00)  HR: 92 (14 Sep 2019 07:00) (88 - 105)  BP: 103/57 (14 Sep 2019 07:00) (93/52 - 126/61)  BP(mean): 74 (14 Sep 2019 07:00) (64 - 85)  ABP: --  ABP(mean): --  RR: 12 (14 Sep 2019 07:00) (11 - 36)  SpO2: 95% (14 Sep 2019 07:00) (92% - 98%)  CVP(mm Hg): --  CVP(cm H2O): --  CO: --  CI: --  PA: --  SVR: --    I&O's Detail    13 Sep 2019 07:01  -  14 Sep 2019 07:00  --------------------------------------------------------  IN:    Oral Fluid: 470 mL  Total IN: 470 mL    OUT:    Indwelling Catheter - Urethral: 952 mL    Voided: 1200 mL  Total OUT: 2152 mL        Net:   I&O's Detail    12 Sep 2019 07:01  -  13 Sep 2019 07:00  --------------------------------------------------------  Total NET: -1908.1 mL      13 Sep 2019 07:01  -  14 Sep 2019 07:00  --------------------------------------------------------  Total NET: -1682 mL        CAPILLARY BLOOD GLUCOSE  121 (13 Sep 2019 07:00)      POCT Blood Glucose.: 142 mg/dL (14 Sep 2019 06:46)  POCT Blood Glucose.: 171 mg/dL (13 Sep 2019 15:37)  POCT Blood Glucose.: 163 mg/dL (13 Sep 2019 11:24)    Physical Exam:  General: NAD; A&Ox3/Patient is intubated and sedated  Cardiac: S1/S2, RRR, no murmur, no rubs  Lungs: unlabored respirations, CTA b/l, no wheeze, no rales, no crackles  Abdomen: Soft/NT/ND; positive bowel sounds x 4  Sternum: Intact, no click, incision healing well with no drainage  Incisions: Incisions clean/dry/intact  Extremities: No edema b/l lower extremities; good capillary refill; no cyanosis; palpable 1+ pedal pulses b/l    Central Venous Catheter: Yes[]  No[] , If Yes indication:           Day #  Lane Catheter: Yes  [] , No  [] , If yes indication:                      Day #  NGT: Yes [] No [] ,    If Yes Placement:                                     Day #  EPICARDIAL WIRES:  [] YES [] NO                                              Day #  BOWEL MOVEMENT:  [] YES [] NO, If No, Timing since last BM:      Day #  CHEST TUBE(Left/Right):  [] YES [] NO, If yes -  AIR LEAKS:  [] YES [] NO        LABS:                        7.9<L>  7.44  )-----------( 129<L>    ( 14 Sep 2019 01:45 )             24.0<L>                        7.9<L>  9.08  )-----------( 112<L>    ( 13 Sep 2019 03:35 )             23.3<L>    09-14    133<L>  |  96<L>  |  19  ----------------------------<  123<H>  4.0   |  24  |  0.7  09-13    134<L>  |  97<L>  |  18  ----------------------------<  109<H>  4.0   |  23  |  0.8    Ca    8.5      14 Sep 2019 01:45  Mg     2.0     09-14    TPro  6.5 [6.0 - 8.0]  /  Alb  4.4 [3.5 - 5.2]  /  TBili  1.1 [0.2 - 1.2]  /  DBili  x   /  AST  119<H> [0 - 41]  /  ALT  404<H> [0 - 41]  /  AlkPhos  56 [30 - 115]  09-14    PT/INR - ( 14 Sep 2019 01:45 )   PT: ;   INR: 1.40 ratio         PT/INR - ( 13 Sep 2019 03:35 )   PT: ;   INR: 1.43 ratio         PTT - ( 13 Sep 2019 03:35 )  PTT:26.8 sec      RADIOLOGY & ADDITIONAL TESTS:  CXR:  EKG:  MEDICATIONS  (STANDING):  abacavir 600 mG/dolutegravir 50 mG/lamiVUDine 300 mG 1 Tablet(s) Oral daily  aspirin enteric coated 81 milliGRAM(s) Oral daily  clopidogrel Tablet 75 milliGRAM(s) Oral daily  dextrose 5%. 1000 milliLiter(s) (50 mL/Hr) IV Continuous <Continuous>  dextrose 50% Injectable 50 milliLiter(s) IV Push every 15 minutes  diltiazem    Tablet 30 milliGRAM(s) Oral every 6 hours  docusate sodium 100 milliGRAM(s) Oral three times a day  guaiFENesin  milliGRAM(s) Oral every 12 hours  heparin  Injectable 5000 Unit(s) SubCutaneous two times a day  insulin glargine Injectable (LANTUS) 16 Unit(s) SubCutaneous every morning  insulin lispro (HumaLOG) corrective regimen sliding scale   SubCutaneous three times a day before meals  insulin lispro Injectable (HumaLOG) 5 Unit(s) SubCutaneous three times a day before meals  ipratropium 17 MICROgram(s) HFA Inhaler 2 Puff(s) Inhalation every 6 hours  magnesium sulfate  IVPB 1 Gram(s) IV Intermittent every 12 hours  pantoprazole    Tablet 40 milliGRAM(s) Oral before breakfast  polyethylene glycol 3350 17 Gram(s) Oral daily  sodium chloride 0.9%. 1000 milliLiter(s) (20 mL/Hr) IV Continuous <Continuous>    MEDICATIONS  (PRN):  dextrose 40% Gel 15 Gram(s) Oral once PRN Blood Glucose LESS THAN 70 milliGRAM(s)/deciliter  glucagon  Injectable 1 milliGRAM(s) IntraMuscular once PRN Glucose LESS THAN 70 milligrams/deciliter  oxyCODONE    IR 10 milliGRAM(s) Oral every 4 hours PRN Severe Pain (7 - 10)  oxyCODONE    IR 5 milliGRAM(s) Oral every 4 hours PRN Moderate Pain (4 - 6)  simethicone 80 milliGRAM(s) Chew two times a day PRN Gas    HEPARIN:  [] YES [] NO  Dose: XX UNITS/HR UNITS Q8H  LOVENOX:[] YES [] NO  Dose: XX mg Q24H  COUMADIN: []  YES [] NO  Dose: XX mg  Q24H  SCD's: YES b/l  GI Prophylaxis: Protonix [], Pepcid [], None [], (Contra-indication:.....)    Post-Op Beta-Blockers: Yes [], No[], If No, then contraindication:  Post-Op Aspirin: Yes [],  No [], If No, then contraindication:  Post-Op Statin: Yes [], No[], If No, then contraindication:  Allergies    No Known Allergies    Intolerances      Ambulation/Activity Status:    Assessment/Plan:  48y Male status-post .....  - Case and plan discussed with CTU Intensivist and CT Surgeon - Dr. Roldan/Lata/Patrizia   - Continue CTU supportive care    - Continue DVT/GI prophylaxis  - Incentive Spirometry 10 times an hour  - Continue to advance physical activity as tolerated and continue PT/OT as directed  1. CAD: Continue ASA, statin, BB  2. HTN:   3. A. Fib:   4. COPD/Hypoxia:   5. DM/Glucose Control:     Social Service Disposition: OPERATIVE PROCEDURE(s):   CABGx4/L radial             POD #        4               48yMale  SURGEON(s): VIDHI Roldan  SUBJECTIVE ASSESSMENT: pt seen and examined. no acute complaints at this time.   Vital Signs Last 24 Hrs  T(F): 98.7 (14 Sep 2019 07:00), Max: 99.8 (13 Sep 2019 11:00)  HR: 92 (14 Sep 2019 07:00) (88 - 105)  BP: 103/57 (14 Sep 2019 07:00) (93/52 - 126/61)  BP(mean): 74 (14 Sep 2019 07:00) (64 - 85)  RR: 12 (14 Sep 2019 07:00) (11 - 36)  SpO2: 95% (14 Sep 2019 07:00) (92% - 98%)    I&O's Detail    13 Sep 2019 07:01  -  14 Sep 2019 07:00  --------------------------------------------------------  IN:    Oral Fluid: 470 mL  Total IN: 470 mL    OUT:    Indwelling Catheter - Urethral: 952 mL    Voided: 1200 mL  Total OUT: 2152 mL        Net:   I&O's Detail    12 Sep 2019 07:01  -  13 Sep 2019 07:00  --------------------------------------------------------  Total NET: -1908.1 mL      13 Sep 2019 07:01  -  14 Sep 2019 07:00  --------------------------------------------------------  Total NET: -1682 mL        CAPILLARY BLOOD GLUCOSE  121 (13 Sep 2019 07:00)      POCT Blood Glucose.: 142 mg/dL (14 Sep 2019 06:46)  POCT Blood Glucose.: 171 mg/dL (13 Sep 2019 15:37)  POCT Blood Glucose.: 163 mg/dL (13 Sep 2019 11:24)    Physical Exam:  General: NAD; A&Ox3  Cardiac: S1/S2, RRR, no murmur, no rubs  Lungs: decreased bs at bases bilaterally  Abdomen: Soft/NT/ND; positive bowel sounds x 4  Sternum: Intact, no click, incision healing well with no drainage  Incisions: Incisions clean/dry/intact  Extremities: 1+ edema b/l lower extremities; good capillary refill; no cyanosis; palpable 1+ pedal pulses b/l    Central Venous Catheter: Yes[x]  No[] , If Yes indication:   hd unstable        Day #4- d/c today  Lane Catheter: Yes  [] , No  [x] , If yes indication:               Day #   NGT: Yes [] No [x] ,    If Yes Placement:                                     Day #  EPICARDIAL WIRES:  [x] YES [] NO                                              Day #4  BOWEL MOVEMENT:  [] YES [x] NO, If No, Timing since last BM:      Day #  CHEST TUBE(Left/Right):  [] YES [x] NO, If yes -  AIR LEAKS:  [] YES [] NO        LABS:                        7.9<L>  7.44  )-----------( 129<L>    ( 14 Sep 2019 01:45 )             24.0<L>                        7.9<L>  9.08  )-----------( 112<L>    ( 13 Sep 2019 03:35 )             23.3<L>    09-14    133<L>  |  96<L>  |  19  ----------------------------<  123<H>  4.0   |  24  |  0.7  09-13    134<L>  |  97<L>  |  18  ----------------------------<  109<H>  4.0   |  23  |  0.8    Ca    8.5      14 Sep 2019 01:45  Mg     2.0     09-14    TPro  6.5 [6.0 - 8.0]  /  Alb  4.4 [3.5 - 5.2]  /  TBili  1.1 [0.2 - 1.2]  /  DBili  x   /  AST  119<H> [0 - 41]  /  ALT  404<H> [0 - 41]  /  AlkPhos  56 [30 - 115]  09-14    PT/INR - ( 14 Sep 2019 01:45 )   PT: ;   INR: 1.40 ratio         PT/INR - ( 13 Sep 2019 03:35 )   PT: ;   INR: 1.43 ratio         PTT - ( 13 Sep 2019 03:35 )  PTT:26.8 sec      RADIOLOGY &  ADDITIONAL TESTS:  CXR: < from: Xray Chest 1 View- PORTABLE-Routine (09.13.19 @ 05:37) >  Impression:      Stable bibasilar opacities. No pneumothorax.    < end of copied text >    EKG: < from: 12 Lead ECG (09.13.19 @ 07:21) >    Ventricular Rate 91 BPM    Atrial Rate 91 BPM    P-R Interval 154 ms    QRS Duration 104 ms    Q-T Interval 356 ms    QTC Calculation(Bezet) 437 ms    P Axis 23 degrees    R Axis 100 degrees    T Axis 11 degrees    Diagnosis Line Sinus rhythm with occasional Premature ventricular complexes  Rightward axis  Septal infarct , age undetermined  Non-specific intra-ventricular conduction delay  Nonspecific ST and T wave abnormality  Abnormal ECG    < end of copied text >    MEDICATIONS  (STANDING):  abacavir 600 mG/dolutegravir 50 mG/lamiVUDine 300 mG 1 Tablet(s) Oral daily  aspirin enteric coated 81 milliGRAM(s) Oral daily  clopidogrel Tablet 75 milliGRAM(s) Oral daily  dextrose 5%. 1000 milliLiter(s) (50 mL/Hr) IV Continuous <Continuous>  dextrose 50% Injectable 50 milliLiter(s) IV Push every 15 minutes  diltiazem    Tablet 30 milliGRAM(s) Oral every 6 hours  docusate sodium 100 milliGRAM(s) Oral three times a day  guaiFENesin  milliGRAM(s) Oral every 12 hours  heparin  Injectable 5000 Unit(s) SubCutaneous two times a day  insulin glargine Injectable (LANTUS) 16 Unit(s) SubCutaneous every morning  insulin lispro (HumaLOG) corrective regimen sliding scale   SubCutaneous three times a day before meals  insulin lispro Injectable (HumaLOG) 5 Unit(s) SubCutaneous three times a day before meals  ipratropium 17 MICROgram(s) HFA Inhaler 2 Puff(s) Inhalation every 6 hours  magnesium sulfate  IVPB 1 Gram(s) IV Intermittent every 12 hours  pantoprazole    Tablet 40 milliGRAM(s) Oral before breakfast  polyethylene glycol 3350 17 Gram(s) Oral daily  sodium chloride 0.9%. 1000 milliLiter(s) (20 mL/Hr) IV Continuous <Continuous>    MEDICATIONS  (PRN):  dextrose 40% Gel 15 Gram(s) Oral once PRN Blood Glucose LESS THAN 70 milliGRAM(s)/deciliter  glucagon  Injectable 1 milliGRAM(s) IntraMuscular once PRN Glucose LESS THAN 70 milligrams/deciliter  oxyCODONE    IR 10 milliGRAM(s) Oral every 4 hours PRN Severe Pain (7 - 10)  oxyCODONE    IR 5 milliGRAM(s) Oral every 4 hours PRN Moderate Pain (4 - 6)  simethicone 80 milliGRAM(s) Chew two times a day PRN Gas    HEPARIN:  [x] YES [] NO  Dose: 5000 UNITS Q8H  LOVENOX:[] YES [x] NO  Dose: XX mg Q24H  COUMADIN: []  YES [x] NO  Dose: XX mg  Q24H  SCD's: YES b/l  GI Prophylaxis: Protonix [x], Pepcid [], None [], (Contra-indication:.....)    Post-Op Beta-Blockers: Yes [], No[x], If No, then contraindication: hypotension after lopressor  Post-Op Aspirin: Yes [x],  No [], If No, then contraindication:  Post-Op Statin: Yes [], No[x], If No, then contraindication: elevated lfts  Allergies    No Known Allergies    Intolerances      Ambulation/Activity Status: ambulate     Assessment/Plan:  48y Male status-post CABGx4, L radial POD#4  - Case and plan discussed with CTU Intensivist and CT Surgeon - Dr. Roldan/Lata/Patrizia   - Continue CTU supportive care    - Continue DVT/GI prophylaxis  - Incentive Spirometry 10 times an hour  - Continue to advance physical activity as tolerated and continue PT/OT as directed  1. CAD: Continue ASA, no statin due to elevated lfts, no bb due to hypotension after bb, cont cardizem to prevent radial vasospasm  2. HTN: cont cardizem for now, d/c bb for hypotension  3. A. Fib: cont cardizem, mag 2gm bid  4. COPD/Hypoxia:  cont nebs, mucinex, lasix for fluid overload   5. DM/Glucose Control: cont lantus 16 with humalog 5 and sliding scale     Social Service Disposition:  home

## 2019-09-15 LAB
ALBUMIN SERPL ELPH-MCNC: 4.5 G/DL — SIGNIFICANT CHANGE UP (ref 3.5–5.2)
ALP SERPL-CCNC: 65 U/L — SIGNIFICANT CHANGE UP (ref 30–115)
ALT FLD-CCNC: 279 U/L — HIGH (ref 0–41)
ANION GAP SERPL CALC-SCNC: 15 MMOL/L — HIGH (ref 7–14)
AST SERPL-CCNC: 56 U/L — HIGH (ref 0–41)
BILIRUB SERPL-MCNC: 1.1 MG/DL — SIGNIFICANT CHANGE UP (ref 0.2–1.2)
BUN SERPL-MCNC: 15 MG/DL — SIGNIFICANT CHANGE UP (ref 10–20)
CALCIUM SERPL-MCNC: 9.1 MG/DL — SIGNIFICANT CHANGE UP (ref 8.5–10.1)
CHLORIDE SERPL-SCNC: 92 MMOL/L — LOW (ref 98–110)
CO2 SERPL-SCNC: 25 MMOL/L — SIGNIFICANT CHANGE UP (ref 17–32)
CREAT SERPL-MCNC: 0.7 MG/DL — SIGNIFICANT CHANGE UP (ref 0.7–1.5)
GLUCOSE BLDC GLUCOMTR-MCNC: 117 MG/DL — HIGH (ref 70–99)
GLUCOSE BLDC GLUCOMTR-MCNC: 120 MG/DL — HIGH (ref 70–99)
GLUCOSE BLDC GLUCOMTR-MCNC: 125 MG/DL — HIGH (ref 70–99)
GLUCOSE BLDC GLUCOMTR-MCNC: 142 MG/DL — HIGH (ref 70–99)
GLUCOSE SERPL-MCNC: 129 MG/DL — HIGH (ref 70–99)
HCT VFR BLD CALC: 28.1 % — LOW (ref 42–52)
HGB BLD-MCNC: 9.4 G/DL — LOW (ref 14–18)
MAGNESIUM SERPL-MCNC: 2.1 MG/DL — SIGNIFICANT CHANGE UP (ref 1.8–2.4)
MCHC RBC-ENTMCNC: 29 PG — SIGNIFICANT CHANGE UP (ref 27–31)
MCHC RBC-ENTMCNC: 33.5 G/DL — SIGNIFICANT CHANGE UP (ref 32–37)
MCV RBC AUTO: 86.7 FL — SIGNIFICANT CHANGE UP (ref 80–94)
NRBC # BLD: 0 /100 WBCS — SIGNIFICANT CHANGE UP (ref 0–0)
PLATELET # BLD AUTO: 176 K/UL — SIGNIFICANT CHANGE UP (ref 130–400)
POTASSIUM SERPL-MCNC: 3.8 MMOL/L — SIGNIFICANT CHANGE UP (ref 3.5–5)
POTASSIUM SERPL-SCNC: 3.8 MMOL/L — SIGNIFICANT CHANGE UP (ref 3.5–5)
PROT SERPL-MCNC: 7.1 G/DL — SIGNIFICANT CHANGE UP (ref 6–8)
RBC # BLD: 3.24 M/UL — LOW (ref 4.7–6.1)
RBC # FLD: 13.4 % — SIGNIFICANT CHANGE UP (ref 11.5–14.5)
SODIUM SERPL-SCNC: 132 MMOL/L — LOW (ref 135–146)
WBC # BLD: 8.36 K/UL — SIGNIFICANT CHANGE UP (ref 4.8–10.8)
WBC # FLD AUTO: 8.36 K/UL — SIGNIFICANT CHANGE UP (ref 4.8–10.8)

## 2019-09-15 PROCEDURE — 93010 ELECTROCARDIOGRAM REPORT: CPT

## 2019-09-15 PROCEDURE — 99233 SBSQ HOSP IP/OBS HIGH 50: CPT

## 2019-09-15 PROCEDURE — 71045 X-RAY EXAM CHEST 1 VIEW: CPT | Mod: 26

## 2019-09-15 RX ORDER — POTASSIUM CHLORIDE 20 MEQ
20 PACKET (EA) ORAL ONCE
Refills: 0 | Status: COMPLETED | OUTPATIENT
Start: 2019-09-15 | End: 2019-09-15

## 2019-09-15 RX ORDER — FUROSEMIDE 40 MG
20 TABLET ORAL ONCE
Refills: 0 | Status: COMPLETED | OUTPATIENT
Start: 2019-09-15 | End: 2019-09-15

## 2019-09-15 RX ORDER — COLCHICINE 0.6 MG
0.6 TABLET ORAL EVERY 12 HOURS
Refills: 0 | Status: DISCONTINUED | OUTPATIENT
Start: 2019-09-15 | End: 2019-09-17

## 2019-09-15 RX ORDER — ATORVASTATIN CALCIUM 80 MG/1
20 TABLET, FILM COATED ORAL AT BEDTIME
Refills: 0 | Status: DISCONTINUED | OUTPATIENT
Start: 2019-09-15 | End: 2019-09-16

## 2019-09-15 RX ADMIN — Medication 60 MILLIGRAM(S): at 21:17

## 2019-09-15 RX ADMIN — Medication 100 MILLIGRAM(S): at 05:58

## 2019-09-15 RX ADMIN — Medication 600 MILLIGRAM(S): at 05:58

## 2019-09-15 RX ADMIN — Medication 100 GRAM(S): at 05:58

## 2019-09-15 RX ADMIN — ATORVASTATIN CALCIUM 20 MILLIGRAM(S): 80 TABLET, FILM COATED ORAL at 21:17

## 2019-09-15 RX ADMIN — Medication 5 UNIT(S): at 16:30

## 2019-09-15 RX ADMIN — Medication 20 MILLIEQUIVALENT(S): at 10:12

## 2019-09-15 RX ADMIN — Medication 100 MILLIGRAM(S): at 21:17

## 2019-09-15 RX ADMIN — Medication 20 MILLIGRAM(S): at 10:12

## 2019-09-15 RX ADMIN — PANTOPRAZOLE SODIUM 40 MILLIGRAM(S): 20 TABLET, DELAYED RELEASE ORAL at 10:11

## 2019-09-15 RX ADMIN — Medication 100 MILLIGRAM(S): at 13:10

## 2019-09-15 RX ADMIN — Medication 5 UNIT(S): at 10:57

## 2019-09-15 RX ADMIN — Medication 5 UNIT(S): at 07:15

## 2019-09-15 RX ADMIN — INSULIN GLARGINE 16 UNIT(S): 100 INJECTION, SOLUTION SUBCUTANEOUS at 07:15

## 2019-09-15 RX ADMIN — Medication 20 MILLIEQUIVALENT(S): at 05:57

## 2019-09-15 RX ADMIN — Medication 81 MILLIGRAM(S): at 11:06

## 2019-09-15 RX ADMIN — Medication 60 MILLIGRAM(S): at 13:10

## 2019-09-15 RX ADMIN — SIMETHICONE 80 MILLIGRAM(S): 80 TABLET, CHEWABLE ORAL at 05:57

## 2019-09-15 RX ADMIN — Medication 60 MILLIGRAM(S): at 05:58

## 2019-09-15 RX ADMIN — Medication 0.6 MILLIGRAM(S): at 17:01

## 2019-09-15 RX ADMIN — HEPARIN SODIUM 5000 UNIT(S): 5000 INJECTION INTRAVENOUS; SUBCUTANEOUS at 17:03

## 2019-09-15 RX ADMIN — CLOPIDOGREL BISULFATE 75 MILLIGRAM(S): 75 TABLET, FILM COATED ORAL at 11:06

## 2019-09-15 RX ADMIN — Medication 2 PUFF(S): at 09:31

## 2019-09-15 RX ADMIN — Medication 600 MILLIGRAM(S): at 17:01

## 2019-09-15 RX ADMIN — Medication 100 GRAM(S): at 17:03

## 2019-09-15 RX ADMIN — HEPARIN SODIUM 5000 UNIT(S): 5000 INJECTION INTRAVENOUS; SUBCUTANEOUS at 05:58

## 2019-09-15 NOTE — PROGRESS NOTE ADULT - SUBJECTIVE AND OBJECTIVE BOX
BRISEIDA SIMON  48y, Male  Allergy: No Known Allergies      CHIEF COMPLAINT: Fevers and left-sided tooth/facial pain (15 Sep 2019 10:02)      INTERVAL EVENTS/HPI  - No acute events overnight  - T(F): , Max: 98.9 (09-14-19 @ 16:00)  - Denies any worsening symptoms  - Tolerating medication  - WBC Count: 8.36 K/uL (09-15-19 @ 01:30)      ROS  General: Denies rigors, nightsweats  HEENT: Denies headache, rhinorrhea, sore throat, eye pain  CV: Denies CP, palpitations  PULM: Denies SOB, wheezing  GI: Denies hematemesis, hematochezia, melena  : Denies discharge, hematuria  MSK: Denies arthralgias, myalgias  SKIN: Denies rash, lesions  NEURO: Denies paresthesias, weakness  PSYCH: Denies depression, anxiety    VITALS:  T(F): 98, Max: 98.9 (09-14-19 @ 16:00)  HR: 106  BP: 121/79  RR: 18Vital Signs Last 24 Hrs  T(C): 36.7 (15 Sep 2019 05:00), Max: 37.2 (14 Sep 2019 16:00)  T(F): 98 (15 Sep 2019 05:00), Max: 98.9 (14 Sep 2019 16:00)  HR: 106 (15 Sep 2019 11:30) (92 - 108)  BP: 121/79 (15 Sep 2019 11:30) (83/56 - 129/55)  BP(mean): 94 (15 Sep 2019 11:30) (66 - 94)  RR: 18 (15 Sep 2019 11:30) (16 - 23)  SpO2: 96% (15 Sep 2019 11:30) (95% - 99%)    PHYSICAL EXAM:  Gen: NAD, resting in bed on BIPAP  HEENT: Normocephalic, atraumatic  Neck: supple, no lymphadenopathy  CV: Regular rate & regular rhythm, midline incision cdi  Lungs: decreased BS at bases, no fremitus  Abdomen: Soft, BS present  Ext: Warm, well perfused, LUE incision cdi  Neuro: non focal, awake  Skin: no rash, no erythema  Lines: no phlebitis      FH: Non-contributory  Social Hx: Non-contributory    TESTS & MEASUREMENTS:                        9.4    8.36  )-----------( 176      ( 15 Sep 2019 01:30 )             28.1     09-15    132<L>  |  92<L>  |  15  ----------------------------<  129<H>  3.8   |  25  |  0.7    Ca    9.1      15 Sep 2019 01:30  Mg     2.1     09-15    TPro  7.1  /  Alb  4.5  /  TBili  1.1  /  DBili  x   /  AST  56<H>  /  ALT  279<H>  /  AlkPhos  65  09-15    eGFR if Non African American: 112 mL/min/1.73M2 (09-15-19 @ 01:30)  eGFR if African American: 130 mL/min/1.73M2 (09-15-19 @ 01:30)    LIVER FUNCTIONS - ( 15 Sep 2019 01:30 )  Alb: 4.5 g/dL / Pro: 7.1 g/dL / ALK PHOS: 65 U/L / ALT: 279 U/L / AST: 56 U/L / GGT: x               Culture - Blood (collected 09-12-19 @ 13:50)  Source: .Blood Blood  Preliminary Report (09-14-19 @ 01:01):    No growth to date.        Blood Gas Venous - Lactate: 0.9 mmoL/L (09-12-19 @ 04:31)  Blood Gas Venous - Lactate: 1.2 mmoL/L (09-11-19 @ 14:01)  Blood Gas Venous - Lactate: 1.7 mmoL/L (09-11-19 @ 12:21)  Blood Gas Venous - Lactate: 1.0 mmoL/L (09-11-19 @ 02:34)      INFECTIOUS DISEASES TESTING  MRSA PCR Result.: Negative (09-08-19 @ 17:59)  HIV-1/2 Combo Result: Reactive (09-02-19 @ 05:44)      RADIOLOGY & ADDITIONAL TESTS:  I have personally reviewed the last Chest xray  CXR      CT      CARDIOLOGY TESTING  12 Lead ECG:   Ventricular Rate 94 BPM    Atrial Rate 94 BPM    P-R Interval 154 ms    QRS Duration 110 ms    Q-T Interval 388 ms    QTC Calculation(Bezet) 485 ms    P Axis 10 degrees    R Axis 59 degrees    T Axis 21 degrees    Diagnosis Line Normal sinus rhythm  Possible Left atrial enlargement  Anteroseptal infarct possibly recent  Nonspecific T wave abnormality  Abnormal ECG    Confirmed by JUJU PUENTES MD ([id number]) on 9/15/2019 10:26:32 AM (09-15-19 @ 08:40)  12 Lead ECG:   Ventricular Rate 91 BPM    Atrial Rate 91 BPM    P-R Interval 154 ms    QRS Duration 104 ms    Q-T Interval 356 ms    QTC Calculation(Bezet) 437 ms    P Axis 23 degrees    R Axis 100 degrees    T Axis 11 degrees    Diagnosis Line Sinus rhythm with occasional Premature ventricular complexes  Rightward axis  Septal infarct , age undetermined  Non-specific intra-ventricular conduction delay  Nonspecific ST and T wave abnormality  Abnormal ECG    Confirmed by JUJU PUENTES MD ([id number]) on 9/13/2019 10:50:41 AM (09-13-19 @ 07:21)      MEDICATIONS  abacavir 600 mG/dolutegravir 50 mG/lamiVUDine 300 mG 1  aspirin enteric coated 81  atorvastatin 20  clopidogrel Tablet 75  colchicine 0.6  dextrose 5%. 1000  dextrose 50% Injectable 50  diltiazem    Tablet 60  docusate sodium 100  guaiFENesin   heparin  Injectable 5000  insulin glargine Injectable (LANTUS) 16  insulin lispro (HumaLOG) corrective regimen sliding scale   insulin lispro Injectable (HumaLOG) 5  ipratropium 17 MICROgram(s) HFA Inhaler 2  magnesium sulfate  IVPB 1  pantoprazole    Tablet 40  polyethylene glycol 3350 17  sodium chloride 0.9%. 1000      ANTIBIOTICS:  abacavir 600 mG/dolutegravir 50 mG/lamiVUDine 300 mG 1 Tablet(s) Oral daily      All available historical records have been reviewed

## 2019-09-15 NOTE — PROGRESS NOTE ADULT - ASSESSMENT
IMPRESSION  47 yo M with HIV on ART, DM, DLD, recent cavity filling 8/26, presents to the ED for worsened left lower molar pain for 1 week, found to have ST elevations V2-3 and elevated CE, fever, found to have L neck submandicular cellulitis and s/p CABG    Post-op course with fever, transaminitis, pericardial effusion     ASSESSMENT/RECOMMENDATION  #Post-op fever, resolved BCX NG    #Asymptomatic HIV  - On triumeq, continue for now, but would D/C on odefsey as better lipid profile (would need to change PPI to H2 blocker as interaction with rilpivirine)  - Follows with Jewels Gu[facility]upe      [address]       [phone]       Fax [phone]     Spectra [phone]

## 2019-09-15 NOTE — PROGRESS NOTE ADULT - SUBJECTIVE AND OBJECTIVE BOX
47 yo M with HIV on ART, DM (HgA1C 6.3), DLD, recent cavity filling 8/26, presents to the ED for worsened left lower molar pain for 1 week, found to have ST elevations V2-3 and elevated CE, fever. Of note did not c/o chest pain at any point. Recent EKG/Stress test wnl. Cardiac Cath with multivessel disease.     POD # 5 - CABG x 4, L- radial. EF 25%    Vital Signs Last 24 Hrs  T(C): 36.7 (15 Sep 2019 05:00), Max: 37.3 (14 Sep 2019 12:00)  T(F): 98 (15 Sep 2019 05:00), Max: 99.1 (14 Sep 2019 12:00)  HR: 97 (15 Sep 2019 08:00) (92 - 108)  BP: 107/59 (15 Sep 2019 08:00) (91/54 - 129/55)  BP(mean): 80 (15 Sep 2019 08:00) (67 - 86)  RR: 16 (15 Sep 2019 08:00) (16 - 23)  SpO2: 97% (15 Sep 2019 08:00) (95% - 99%)    alert, awake, verbal  follows commands appropriately  Right IJ cordis   no JVD  S1S2 reg rate  b/l air entry, diminished effort  soft abdomen, non tender, non distended  warm periphery with + distal pulses, non pitting edema b/l  power 5/5 b/l in all 4 ext    WBC 8.4, Hg 9.4, plt 176  Na 132, K 3.8, chl 92  AST/ALT - 56/279  BUN 15, Cr 0.7    12 lead EKG with ST elevation V2-V5, I with HI depressions. NSR @ 94 with QTc 485  CXR - diffuse b/l congestion left > right, left pleural effusion    a/p:    CAD s/p CABG x4, L radial harvest  Pericarditis  Congestive hepatopathy vs HAART meds  Acute Hypoxic resp failure  HTN  Acute post thoracotomy Pain  DM  Tooth pain s/p extraction    PO ASA 81 mg, Plavix and Heparin SC.  Lasix 40 mg IV x1. Goal is negative fluid balance. Trend lytes.   Start PO Statin at 20 mg, trend LFTs  PO CCB- Cardizem at 60 q8  No beta blocker PO CCB as per request of CT surgery attending.  finished course of Unasyn at 9/10.  Glycemic control  Daily PT. Ambulation

## 2019-09-15 NOTE — PROGRESS NOTE ADULT - SUBJECTIVE AND OBJECTIVE BOX
OPERATIVE PROCEDURE(s):                POD #                       48yMale  SURGEON(s): VIDHI Roldan  SUBJECTIVE ASSESSMENT:   Vital Signs Last 24 Hrs  T(F): 98 (15 Sep 2019 05:00), Max: 99.1 (14 Sep 2019 12:00)  HR: 99 (15 Sep 2019 07:00) (92 - 108)  BP: 111/62 (15 Sep 2019 07:00) (91/54 - 129/55)  BP(mean): 81 (15 Sep 2019 07:00) (67 - 86)  ABP: --  ABP(mean): --  RR: 18 (15 Sep 2019 07:00) (18 - 26)  SpO2: 97% (15 Sep 2019 07:00) (95% - 99%)  CVP(mm Hg): --  CVP(cm H2O): --  CO: --  CI: --  PA: --  SVR: --    I&O's Detail    14 Sep 2019 07:01  -  15 Sep 2019 07:00  --------------------------------------------------------  IN:    IV PiggyBack: 200 mL    Oral Fluid: 1020 mL  Total IN: 1220 mL    OUT:    Voided: 2850 mL  Total OUT: 2850 mL        Net:   I&O's Detail    13 Sep 2019 07:01  -  14 Sep 2019 07:00  --------------------------------------------------------  Total NET: -1682 mL      14 Sep 2019 07:01  -  15 Sep 2019 07:00  --------------------------------------------------------  Total NET: -1630 mL        CAPILLARY BLOOD GLUCOSE      POCT Blood Glucose.: 142 mg/dL (15 Sep 2019 06:56)  POCT Blood Glucose.: 104 mg/dL (14 Sep 2019 21:59)  POCT Blood Glucose.: 123 mg/dL (14 Sep 2019 15:49)  POCT Blood Glucose.: 115 mg/dL (14 Sep 2019 11:22)    Physical Exam:  General: NAD; A&Ox3/Patient is intubated and sedated  Cardiac: S1/S2, RRR, no murmur, no rubs  Lungs: unlabored respirations, CTA b/l, no wheeze, no rales, no crackles  Abdomen: Soft/NT/ND; positive bowel sounds x 4  Sternum: Intact, no click, incision healing well with no drainage  Incisions: Incisions clean/dry/intact  Extremities: No edema b/l lower extremities; good capillary refill; no cyanosis; palpable 1+ pedal pulses b/l    Central Venous Catheter: Yes[]  No[] , If Yes indication:           Day #  Lane Catheter: Yes  [] , No  [] , If yes indication:                      Day #  NGT: Yes [] No [] ,    If Yes Placement:                                     Day #  EPICARDIAL WIRES:  [] YES [] NO                                              Day #  BOWEL MOVEMENT:  [] YES [] NO, If No, Timing since last BM:      Day #  CHEST TUBE(Left/Right):  [] YES [] NO, If yes -  AIR LEAKS:  [] YES [] NO        LABS:                        9.4<L>  8.36  )-----------( 176      ( 15 Sep 2019 01:30 )             28.1<L>                        7.9<L>  7.44  )-----------( 129<L>    ( 14 Sep 2019 01:45 )             24.0<L>    09-15    132<L>  |  92<L>  |  15  ----------------------------<  129<H>  3.8   |  25  |  0.7  09-14    133<L>  |  96<L>  |  19  ----------------------------<  123<H>  4.0   |  24  |  0.7    Ca    9.1      15 Sep 2019 01:30  Mg     2.1     09-15    TPro  7.1 [6.0 - 8.0]  /  Alb  4.5 [3.5 - 5.2]  /  TBili  1.1 [0.2 - 1.2]  /  DBili  x   /  AST  56<H> [0 - 41]  /  ALT  279<H> [0 - 41]  /  AlkPhos  65 [30 - 115]  09-15    PT/INR - ( 14 Sep 2019 01:45 )   PT: ;   INR: 1.40 ratio               RADIOLOGY & ADDITIONAL TESTS:  CXR:  EKG:  MEDICATIONS  (STANDING):  abacavir 600 mG/dolutegravir 50 mG/lamiVUDine 300 mG 1 Tablet(s) Oral daily  aspirin enteric coated 81 milliGRAM(s) Oral daily  clopidogrel Tablet 75 milliGRAM(s) Oral daily  dextrose 5%. 1000 milliLiter(s) (50 mL/Hr) IV Continuous <Continuous>  dextrose 50% Injectable 50 milliLiter(s) IV Push every 15 minutes  diltiazem    Tablet 60 milliGRAM(s) Oral every 8 hours  docusate sodium 100 milliGRAM(s) Oral three times a day  guaiFENesin  milliGRAM(s) Oral every 12 hours  heparin  Injectable 5000 Unit(s) SubCutaneous two times a day  insulin glargine Injectable (LANTUS) 16 Unit(s) SubCutaneous every morning  insulin lispro (HumaLOG) corrective regimen sliding scale   SubCutaneous three times a day before meals  insulin lispro Injectable (HumaLOG) 5 Unit(s) SubCutaneous three times a day before meals  ipratropium 17 MICROgram(s) HFA Inhaler 2 Puff(s) Inhalation every 6 hours  magnesium sulfate  IVPB 1 Gram(s) IV Intermittent every 12 hours  pantoprazole    Tablet 40 milliGRAM(s) Oral before breakfast  polyethylene glycol 3350 17 Gram(s) Oral daily  sodium chloride 0.9%. 1000 milliLiter(s) (20 mL/Hr) IV Continuous <Continuous>    MEDICATIONS  (PRN):  dextrose 40% Gel 15 Gram(s) Oral once PRN Blood Glucose LESS THAN 70 milliGRAM(s)/deciliter  glucagon  Injectable 1 milliGRAM(s) IntraMuscular once PRN Glucose LESS THAN 70 milligrams/deciliter  oxyCODONE    IR 10 milliGRAM(s) Oral every 4 hours PRN Severe Pain (7 - 10)  oxyCODONE    IR 5 milliGRAM(s) Oral every 4 hours PRN Moderate Pain (4 - 6)  simethicone 80 milliGRAM(s) Chew two times a day PRN Gas    HEPARIN:  [] YES [] NO  Dose: XX UNITS/HR UNITS Q8H  LOVENOX:[] YES [] NO  Dose: XX mg Q24H  COUMADIN: []  YES [] NO  Dose: XX mg  Q24H  SCD's: YES b/l  GI Prophylaxis: Protonix [], Pepcid [], None [], (Contra-indication:.....)    Post-Op Beta-Blockers: Yes [], No[], If No, then contraindication:  Post-Op Aspirin: Yes [],  No [], If No, then contraindication:  Post-Op Statin: Yes [], No[], If No, then contraindication:  Allergies    No Known Allergies    Intolerances      Ambulation/Activity Status:    Assessment/Plan:  48y Male status-post .....  - Case and plan discussed with CTU Intensivist and CT Surgeon - Dr. Roldan/Lata/Patrizia   - Continue CTU supportive care    - Continue DVT/GI prophylaxis  - Incentive Spirometry 10 times an hour  - Continue to advance physical activity as tolerated and continue PT/OT as directed  1. CAD: Continue ASA, statin, BB  2. HTN:   3. A. Fib:   4. COPD/Hypoxia:   5. DM/Glucose Control:     Social Service Disposition: OPERATIVE PROCEDURE(s):   CABGx4 L radial             POD #     5                  48yMale  SURGEON(s): VIDHI Roldan  SUBJECTIVE ASSESSMENT: pt seen and examined. no acute complaints at this time.   Vital Signs Last 24 Hrs  T(F): 98 (15 Sep 2019 05:00), Max: 99.1 (14 Sep 2019 12:00)  HR: 99 (15 Sep 2019 07:00) (92 - 108)  BP: 111/62 (15 Sep 2019 07:00) (91/54 - 129/55)  BP(mean): 81 (15 Sep 2019 07:00) (67 - 86)  RR: 18 (15 Sep 2019 07:00) (18 - 26)  SpO2: 97% (15 Sep 2019 07:00) (95% - 99%)    I&O's Detail    14 Sep 2019 07:01  -  15 Sep 2019 07:00  --------------------------------------------------------  IN:    IV PiggyBack: 200 mL    Oral Fluid: 1020 mL  Total IN: 1220 mL    OUT:    Voided: 2850 mL  Total OUT: 2850 mL        Net:   I&O's Detail    13 Sep 2019 07:01  -  14 Sep 2019 07:00  --------------------------------------------------------  Total NET: -1682 mL      14 Sep 2019 07:01  -  15 Sep 2019 07:00  --------------------------------------------------------  Total NET: -1630 mL        CAPILLARY BLOOD GLUCOSE      POCT Blood Glucose.: 142 mg/dL (15 Sep 2019 06:56)  POCT Blood Glucose.: 104 mg/dL (14 Sep 2019 21:59)  POCT Blood Glucose.: 123 mg/dL (14 Sep 2019 15:49)  POCT Blood Glucose.: 115 mg/dL (14 Sep 2019 11:22)    Physical Exam:  General: NAD; A&Ox3  Cardiac: S1/S2, RRR, no murmur, no rubs  Lungs: decreased bs at bases bilaterally  Abdomen: Soft/NT/ND; positive bowel sounds x 4  Sternum: Intact, no click, incision healing well with no drainage  Incisions: Incisions clean/dry/intact  Extremities: 1+ edema b/l lower extremities; good capillary refill; no cyanosis; palpable 1+ pedal pulses b/l    Central Venous Catheter: Yes[]  No[x] , If Yes indication:   hd unstable        Day #  Lane Catheter: Yes  [] , No  [x] , If yes indication:               Day #   NGT: Yes [] No [x] ,    If Yes Placement:                                     Day #  EPICARDIAL WIRES:  [x] YES [] NO                                              Day #5  BOWEL MOVEMENT:  [x] YES [] NO, If No, Timing since last BM:      Day #  CHEST TUBE(Left/Right):  [] YES [x] NO, If yes -  AIR LEAKS:  [] YES [] NO        LABS:                        9.4<L>  8.36  )-----------( 176      ( 15 Sep 2019 01:30 )             28.1<L>                        7.9<L>  7.44  )-----------( 129<L>    ( 14 Sep 2019 01:45 )             24.0<L>    09-15    132<L>  |  92<L>  |  15  ----------------------------<  129<H>  3.8   |  25  |  0.7  09-14    133<L>  |  96<L>  |  19  ----------------------------<  123<H>  4.0   |  24  |  0.7    Ca    9.1      15 Sep 2019 01:30  Mg     2.1     09-15    TPro  7.1 [6.0 - 8.0]  /  Alb  4.5 [3.5 - 5.2]  /  TBili  1.1 [0.2 - 1.2]  /  DBili  x   /  AST  56<H> [0 - 41]  /  ALT  279<H> [0 - 41]  /  AlkPhos  65 [30 - 115]  09-15    PT/INR - ( 14 Sep 2019 01:45 )   PT: ;   INR: 1.40 ratio               RADIOLOGY & ADDITIONAL TESTS:  CXR: < from: Xray Chest 1 View- PORTABLE-Routine (09.14.19 @ 04:49) >  Impression:      Cardiomegaly and right basilar opacity, stable. Left basilar   opacity/pleural effusion, increased.    < end of copied text >       MEDICATIONS  (STANDING):  abacavir 600 mG/dolutegravir 50 mG/lamiVUDine 300 mG 1 Tablet(s) Oral daily  aspirin enteric coated 81 milliGRAM(s) Oral daily  clopidogrel Tablet 75 milliGRAM(s) Oral daily  dextrose 5%. 1000 milliLiter(s) (50 mL/Hr) IV Continuous <Continuous>  dextrose 50% Injectable 50 milliLiter(s) IV Push every 15 minutes  diltiazem    Tablet 60 milliGRAM(s) Oral every 8 hours  docusate sodium 100 milliGRAM(s) Oral three times a day  guaiFENesin  milliGRAM(s) Oral every 12 hours  heparin  Injectable 5000 Unit(s) SubCutaneous two times a day  insulin glargine Injectable (LANTUS) 16 Unit(s) SubCutaneous every morning  insulin lispro (HumaLOG) corrective regimen sliding scale   SubCutaneous three times a day before meals  insulin lispro Injectable (HumaLOG) 5 Unit(s) SubCutaneous three times a day before meals  ipratropium 17 MICROgram(s) HFA Inhaler 2 Puff(s) Inhalation every 6 hours  magnesium sulfate  IVPB 1 Gram(s) IV Intermittent every 12 hours  pantoprazole    Tablet 40 milliGRAM(s) Oral before breakfast  polyethylene glycol 3350 17 Gram(s) Oral daily  sodium chloride 0.9%. 1000 milliLiter(s) (20 mL/Hr) IV Continuous <Continuous>    MEDICATIONS  (PRN):  dextrose 40% Gel 15 Gram(s) Oral once PRN Blood Glucose LESS THAN 70 milliGRAM(s)/deciliter  glucagon  Injectable 1 milliGRAM(s) IntraMuscular once PRN Glucose LESS THAN 70 milligrams/deciliter  oxyCODONE    IR 10 milliGRAM(s) Oral every 4 hours PRN Severe Pain (7 - 10)  oxyCODONE    IR 5 milliGRAM(s) Oral every 4 hours PRN Moderate Pain (4 - 6)  simethicone 80 milliGRAM(s) Chew two times a day PRN Gas    HEPARIN:  [x] YES [] NO  Dose: 5000 UNITS Q8H  LOVENOX:[] YES [x] NO  Dose: XX mg Q24H  COUMADIN: []  YES [x] NO  Dose: XX mg  Q24H  SCD's: YES b/l  GI Prophylaxis: Protonix [x], Pepcid [], None [], (Contra-indication:.....)    Post-Op Beta-Blockers: Yes [], No[x], If No, then contraindication: was hypotensive after bb  Post-Op Aspirin: Yes [x],  No [], If No, then contraindication:  Post-Op Statin: Yes [], No[x], If No, then contraindication: elevated lfts  Allergies    No Known Allergies    Intolerances      Ambulation/Activity Status: ambulate     Assessment/Plan:  48y Male status-post CABGx4, L radial POD#5  - Case and plan discussed with CTU Intensivist and CT Surgeon - Dr. Roldan/Lata/Patrizia   - Continue CTU supportive care    - Continue DVT/GI prophylaxis  - Incentive Spirometry 10 times an hour  - Continue to advance physical activity as tolerated and continue PT/OT as directed  1. CAD: Continue ASA, no statin due to elevated lfts, no bb due to hypotension after bb, cont cardizem to prevent radial vasospasm  2. HTN: cont cardizem for now, no bb for hypotension  3. A. Fib: cont cardizem, mag 2gm bid  4. COPD/Hypoxia:  cont nebs, mucinex, lasix for fluid overload   5. DM/Glucose Control: cont lantus 16 with humalog 5 and sliding scale     Social Service Disposition:  home

## 2019-09-16 LAB
ALBUMIN SERPL ELPH-MCNC: 4.4 G/DL — SIGNIFICANT CHANGE UP (ref 3.5–5.2)
ALP SERPL-CCNC: 65 U/L — SIGNIFICANT CHANGE UP (ref 30–115)
ALT FLD-CCNC: 176 U/L — HIGH (ref 0–41)
ANION GAP SERPL CALC-SCNC: 15 MMOL/L — HIGH (ref 7–14)
AST SERPL-CCNC: 33 U/L — SIGNIFICANT CHANGE UP (ref 0–41)
BILIRUB SERPL-MCNC: 1.4 MG/DL — HIGH (ref 0.2–1.2)
BUN SERPL-MCNC: 12 MG/DL — SIGNIFICANT CHANGE UP (ref 10–20)
CALCIUM SERPL-MCNC: 8.9 MG/DL — SIGNIFICANT CHANGE UP (ref 8.5–10.1)
CHLORIDE SERPL-SCNC: 92 MMOL/L — LOW (ref 98–110)
CO2 SERPL-SCNC: 25 MMOL/L — SIGNIFICANT CHANGE UP (ref 17–32)
CREAT SERPL-MCNC: 0.6 MG/DL — LOW (ref 0.7–1.5)
GLUCOSE BLDC GLUCOMTR-MCNC: 103 MG/DL — HIGH (ref 70–99)
GLUCOSE BLDC GLUCOMTR-MCNC: 128 MG/DL — HIGH (ref 70–99)
GLUCOSE BLDC GLUCOMTR-MCNC: 141 MG/DL — HIGH (ref 70–99)
GLUCOSE SERPL-MCNC: 127 MG/DL — HIGH (ref 70–99)
HCT VFR BLD CALC: 29 % — LOW (ref 42–52)
HGB BLD-MCNC: 9.6 G/DL — LOW (ref 14–18)
MAGNESIUM SERPL-MCNC: 2 MG/DL — SIGNIFICANT CHANGE UP (ref 1.8–2.4)
MCHC RBC-ENTMCNC: 28.9 PG — SIGNIFICANT CHANGE UP (ref 27–31)
MCHC RBC-ENTMCNC: 33.1 G/DL — SIGNIFICANT CHANGE UP (ref 32–37)
MCV RBC AUTO: 87.3 FL — SIGNIFICANT CHANGE UP (ref 80–94)
NRBC # BLD: 0 /100 WBCS — SIGNIFICANT CHANGE UP (ref 0–0)
PLATELET # BLD AUTO: 211 K/UL — SIGNIFICANT CHANGE UP (ref 130–400)
POTASSIUM SERPL-MCNC: 3.6 MMOL/L — SIGNIFICANT CHANGE UP (ref 3.5–5)
POTASSIUM SERPL-SCNC: 3.6 MMOL/L — SIGNIFICANT CHANGE UP (ref 3.5–5)
PROT SERPL-MCNC: 7.1 G/DL — SIGNIFICANT CHANGE UP (ref 6–8)
RBC # BLD: 3.32 M/UL — LOW (ref 4.7–6.1)
RBC # FLD: 13.5 % — SIGNIFICANT CHANGE UP (ref 11.5–14.5)
SODIUM SERPL-SCNC: 132 MMOL/L — LOW (ref 135–146)
WBC # BLD: 7.93 K/UL — SIGNIFICANT CHANGE UP (ref 4.8–10.8)
WBC # FLD AUTO: 7.93 K/UL — SIGNIFICANT CHANGE UP (ref 4.8–10.8)

## 2019-09-16 PROCEDURE — 99233 SBSQ HOSP IP/OBS HIGH 50: CPT

## 2019-09-16 PROCEDURE — 93306 TTE W/DOPPLER COMPLETE: CPT | Mod: 26

## 2019-09-16 PROCEDURE — 93010 ELECTROCARDIOGRAM REPORT: CPT

## 2019-09-16 PROCEDURE — 71045 X-RAY EXAM CHEST 1 VIEW: CPT | Mod: 26

## 2019-09-16 RX ORDER — POTASSIUM CHLORIDE 20 MEQ
40 PACKET (EA) ORAL ONCE
Refills: 0 | Status: COMPLETED | OUTPATIENT
Start: 2019-09-16 | End: 2019-09-16

## 2019-09-16 RX ORDER — ISOSORBIDE DINITRATE 5 MG/1
5 TABLET ORAL THREE TIMES A DAY
Refills: 0 | Status: DISCONTINUED | OUTPATIENT
Start: 2019-09-16 | End: 2019-09-16

## 2019-09-16 RX ORDER — DILTIAZEM HCL 120 MG
60 CAPSULE, EXT RELEASE 24 HR ORAL EVERY 8 HOURS
Refills: 0 | Status: DISCONTINUED | OUTPATIENT
Start: 2019-09-16 | End: 2019-09-16

## 2019-09-16 RX ORDER — FUROSEMIDE 40 MG
20 TABLET ORAL DAILY
Refills: 0 | Status: DISCONTINUED | OUTPATIENT
Start: 2019-09-16 | End: 2019-09-17

## 2019-09-16 RX ORDER — METFORMIN HYDROCHLORIDE 850 MG/1
1000 TABLET ORAL EVERY 12 HOURS
Refills: 0 | Status: DISCONTINUED | OUTPATIENT
Start: 2019-09-16 | End: 2019-09-17

## 2019-09-16 RX ORDER — METOPROLOL TARTRATE 50 MG
12.5 TABLET ORAL
Refills: 0 | Status: DISCONTINUED | OUTPATIENT
Start: 2019-09-16 | End: 2019-09-17

## 2019-09-16 RX ORDER — SPIRONOLACTONE 25 MG/1
25 TABLET, FILM COATED ORAL DAILY
Refills: 0 | Status: DISCONTINUED | OUTPATIENT
Start: 2019-09-16 | End: 2019-09-17

## 2019-09-16 RX ORDER — ISOSORBIDE DINITRATE 5 MG/1
5 TABLET ORAL THREE TIMES A DAY
Refills: 0 | Status: DISCONTINUED | OUTPATIENT
Start: 2019-09-16 | End: 2019-09-17

## 2019-09-16 RX ORDER — METOPROLOL TARTRATE 50 MG
2.5 TABLET ORAL ONCE
Refills: 0 | Status: COMPLETED | OUTPATIENT
Start: 2019-09-16 | End: 2019-09-16

## 2019-09-16 RX ADMIN — Medication 2.5 MILLIGRAM(S): at 13:50

## 2019-09-16 RX ADMIN — OXYCODONE HYDROCHLORIDE 10 MILLIGRAM(S): 5 TABLET ORAL at 01:46

## 2019-09-16 RX ADMIN — INSULIN GLARGINE 16 UNIT(S): 100 INJECTION, SOLUTION SUBCUTANEOUS at 07:37

## 2019-09-16 RX ADMIN — Medication 60 MILLIGRAM(S): at 05:59

## 2019-09-16 RX ADMIN — Medication 81 MILLIGRAM(S): at 12:31

## 2019-09-16 RX ADMIN — METFORMIN HYDROCHLORIDE 1000 MILLIGRAM(S): 850 TABLET ORAL at 12:30

## 2019-09-16 RX ADMIN — POLYETHYLENE GLYCOL 3350 17 GRAM(S): 17 POWDER, FOR SOLUTION ORAL at 12:30

## 2019-09-16 RX ADMIN — Medication 12.5 MILLIGRAM(S): at 14:24

## 2019-09-16 RX ADMIN — Medication 100 GRAM(S): at 06:00

## 2019-09-16 RX ADMIN — ISOSORBIDE DINITRATE 5 MILLIGRAM(S): 5 TABLET ORAL at 15:04

## 2019-09-16 RX ADMIN — Medication 5 UNIT(S): at 07:36

## 2019-09-16 RX ADMIN — SPIRONOLACTONE 25 MILLIGRAM(S): 25 TABLET, FILM COATED ORAL at 12:30

## 2019-09-16 RX ADMIN — Medication 5 UNIT(S): at 12:00

## 2019-09-16 RX ADMIN — PANTOPRAZOLE SODIUM 40 MILLIGRAM(S): 20 TABLET, DELAYED RELEASE ORAL at 06:01

## 2019-09-16 RX ADMIN — Medication 100 MILLIGRAM(S): at 05:59

## 2019-09-16 RX ADMIN — ISOSORBIDE DINITRATE 5 MILLIGRAM(S): 5 TABLET ORAL at 23:44

## 2019-09-16 RX ADMIN — Medication 0.6 MILLIGRAM(S): at 18:22

## 2019-09-16 RX ADMIN — Medication 100 GRAM(S): at 18:22

## 2019-09-16 RX ADMIN — OXYCODONE HYDROCHLORIDE 10 MILLIGRAM(S): 5 TABLET ORAL at 02:15

## 2019-09-16 RX ADMIN — Medication 40 MILLIEQUIVALENT(S): at 06:00

## 2019-09-16 RX ADMIN — Medication 20 MILLIGRAM(S): at 12:30

## 2019-09-16 RX ADMIN — Medication 600 MILLIGRAM(S): at 05:59

## 2019-09-16 RX ADMIN — HEPARIN SODIUM 5000 UNIT(S): 5000 INJECTION INTRAVENOUS; SUBCUTANEOUS at 18:22

## 2019-09-16 RX ADMIN — SIMETHICONE 80 MILLIGRAM(S): 80 TABLET, CHEWABLE ORAL at 22:10

## 2019-09-16 RX ADMIN — HEPARIN SODIUM 5000 UNIT(S): 5000 INJECTION INTRAVENOUS; SUBCUTANEOUS at 05:59

## 2019-09-16 RX ADMIN — Medication 600 MILLIGRAM(S): at 18:22

## 2019-09-16 RX ADMIN — CLOPIDOGREL BISULFATE 75 MILLIGRAM(S): 75 TABLET, FILM COATED ORAL at 12:31

## 2019-09-16 RX ADMIN — Medication 100 MILLIGRAM(S): at 15:03

## 2019-09-16 RX ADMIN — Medication 0.6 MILLIGRAM(S): at 05:59

## 2019-09-16 RX ADMIN — Medication 12.5 MILLIGRAM(S): at 17:22

## 2019-09-16 NOTE — PROGRESS NOTE ADULT - SUBJECTIVE AND OBJECTIVE BOX
OPERATIVE PROCEDURE(s):   CABGx4, L radial          POD#6     SURGEON(s): VIDHI Roldan  SUBJECTIVE ASSESSMENT: 49 y/o Male patient seen and examined at bedside. Patient denies any acute complaints at this time.     Vital Signs Last 24 Hrs  T(F): 98.5 (16 Sep 2019 04:00), Max: 98.5 (16 Sep 2019 04:00)  HR: 100 (16 Sep 2019 07:00) (96 - 112)  BP: 120/64 (16 Sep 2019 07:00) (91/53 - 125/64)  BP(mean): 86 (16 Sep 2019 07:00) (66 - 88)  RR: 21 (16 Sep 2019 07:00) (16 - 23)  SpO2: 99% (16 Sep 2019 07:00) (94% - 99%)    I&O's Detail    15 Sep 2019 07:01  -  16 Sep 2019 07:00  --------------------------------------------------------  IN:    IV PiggyBack: 200 mL    Oral Fluid: 780 mL  Total IN: 980 mL    OUT:    Voided: 2900 mL  Total OUT: 2900 mL    Net: I&O's Detail    14 Sep 2019 07:01  -  15 Sep 2019 07:00  --------------------------------------------------------  Total NET: -1630 mL    15 Sep 2019 07:01  -  16 Sep 2019 07:00  --------------------------------------------------------  Total NET: -1920 mL      CAPILLARY BLOOD GLUCOSE  POCT Blood Glucose.: 128 mg/dL (16 Sep 2019 11:26)  POCT Blood Glucose.: 141 mg/dL (16 Sep 2019 06:59)  POCT Blood Glucose.: 120 mg/dL (15 Sep 2019 21:13)  POCT Blood Glucose.: 125 mg/dL (15 Sep 2019 15:50)      Physical Exam:  General: NAD; A&Ox3  Cardiac: S1/S2, RRR, no murmur, + rub  Lungs: unlabored respirations, CTA b/l, no wheeze, no rales, no crackles  Abdomen: Soft/NT/ND; positive bowel sounds x 4  Sternum: Intact, no click, incision healing well with no drainage  Incisions: Incisions clean/dry/intact  Extremities: No edema b/l lower extremities; good capillary refill; no cyanosis; palpable 1+ pedal pulses b/l        LABS:                        9.6<L>  7.93  )-----------( 211      ( 16 Sep 2019 04:00 )             29.0<L>                        9.4<L>  8.36  )-----------( 176      ( 15 Sep 2019 01:30 )             28.1<L>    09-16    132<L>  |  92<L>  |  12  ----------------------------<  127<H>  3.6   |  25  |  0.6<L>    09-15  132<L>  |  92<L>  |  15  ----------------------------<  129<H>  3.8   |  25  |  0.7    Ca    8.9      16 Sep 2019 04:00  Mg     2.0     09-16    TPro  7.1 [6.0 - 8.0]  /  Alb  4.4 [3.5 - 5.2]  /  TBili  1.4<H> [0.2 - 1.2]  /  DBili  x   /  AST  33 [0 - 41]  /  ALT  176<H> [0 - 41]  /  AlkPhos  65 [30 - 115]  09-16    RADIOLOGY & ADDITIONAL TESTS:  CXR: < from: Xray Chest 1 View- PORTABLE-Routine (09.16.19 @ 05:07) >  Impression:  Unchanged bibasilar opacities/atelectasis.  < end of copied text >    EKG: < from: 12 Lead ECG (09.16.19 @ 07:31) >  Ventricular Rate 103 BPM  Atrial Rate 103 BPM  P-R Interval 152 ms  QRS Duration 110 ms  Q-T Interval 358 ms  QTC Calculation(Bezet) 468 ms  P Axis 35 degrees  R Axis 84 degrees  T Axis 28 degrees  Diagnosis Line Sinus tachycardia  Anteroseptal infarct , age undetermined  Abnormal ECG  Confirmed by Patricia Sotomayor MD ([id number]) on 9/16/2019 8:38:28 AM  < end of copied text >    TTE: < from: Transthoracic Echocardiogram (09.16.19 @ 10:48) >  Summary:   1. Technically difficult study.   2. Left ventricularejection fraction, by visual estimation, is 40 to 45%.   3. Mildly to moderately decreased global left ventricular systolic function.   4. Multiple left ventricular regional wall motion abnormalities exist. See wall motion findings.    PHYSICIAN INTERPRETATION:  Left Ventricle: The left ventricular internal cavity size is normal. Left ventricular wall thickness is mildly increased. Global LV systolic   function was mildly to moderately decreased. Left ventricular ejection fraction, by visual estimation, is 40 to 45%.     LV Wall Scoring:  The mid and apical anterior septum, mid and apical inferior septum, and apex  are akinetic. The basal anteroseptal segment and basal inferoseptal segment are hypokinetic.    Right Ventricle: RV systolic function is normal.  Left Atrium: Normal left atrial size.  Right Atrium: Normal right atrial size.  Pericardium: There is no evidence of pericardial effusion.  Mitral Valve: The mitral valve is normal in structure. Trace mitral valve regurgitation.  Tricuspid Valve: The tricuspid valve is grossly normal in structure. No tricuspid regurgitation visualized. Adequate TR velocity was not obtained to accurately assess RVSP.  Aortic Valve: The aortic valve is trileaflet. No evidence of aortic stenosis. No aortic regurgitation.  Pulmonic Valve: Trace pulmonic valve regurgitation.  Aorta: Aortic root measured at sinotubular junction is normal.  < end of copied text >      Allergies  No Known Allergies  Intolerances      MEDICATIONS  (STANDING):  abacavir 600 mG/dolutegravir 50 mG/lamiVUDine 300 mG 1 Tablet(s) Oral daily  aspirin enteric coated 81 milliGRAM(s) Oral daily  clopidogrel Tablet 75 milliGRAM(s) Oral daily  colchicine 0.6 milliGRAM(s) Oral every 12 hours  dextrose 5%. 1000 milliLiter(s) (50 mL/Hr) IV Continuous <Continuous>  dextrose 50% Injectable 50 milliLiter(s) IV Push every 15 minutes  docusate sodium 100 milliGRAM(s) Oral three times a day  furosemide    Tablet 20 milliGRAM(s) Oral daily  guaiFENesin  milliGRAM(s) Oral every 12 hours  heparin  Injectable 5000 Unit(s) SubCutaneous two times a day  insulin lispro (HumaLOG) corrective regimen sliding scale   SubCutaneous three times a day before meals  ipratropium 17 MICROgram(s) HFA Inhaler 2 Puff(s) Inhalation every 6 hours  magnesium sulfate  IVPB 1 Gram(s) IV Intermittent every 12 hours  metFORMIN 1000 milliGRAM(s) Oral every 12 hours  metoprolol tartrate 12.5 milliGRAM(s) Oral two times a day  pantoprazole    Tablet 40 milliGRAM(s) Oral before breakfast  polyethylene glycol 3350 17 Gram(s) Oral daily  sitaGLIPtin 100 milliGRAM(s) Oral daily  sodium chloride 0.9%. 1000 milliLiter(s) (20 mL/Hr) IV Continuous <Continuous>  spironolactone 25 milliGRAM(s) Oral daily    MEDICATIONS  (PRN):  dextrose 40% Gel 15 Gram(s) Oral once PRN Blood Glucose LESS THAN 70 milliGRAM(s)/deciliter  glucagon  Injectable 1 milliGRAM(s) IntraMuscular once PRN Glucose LESS THAN 70 milligrams/deciliter  oxyCODONE    IR 10 milliGRAM(s) Oral every 4 hours PRN Severe Pain (7 - 10)  oxyCODONE    IR 5 milliGRAM(s) Oral every 4 hours PRN Moderate Pain (4 - 6)  simethicone 80 milliGRAM(s) Chew two times a day PRN Gas      Pharmacologic DVT Prophylaxis: heparin  Injectable 5000 Unit(s) SubCutaneous two times a day         SCD's: YES b/l    GI Prophylaxis: pantoprazole    Tablet 40 milliGRAM(s) Oral before breakfast    Post-Op Beta-Blockers: [x]Yes, []No: contraindication:  Post-Op nitrate: [x]Yes, []No: contraindication:  Post-Op Aspirin: [x]Yes,  []No: contraindication:  Post-Op Statin: []Yes, [x]No: contraindication: elevated LFTs      Ambulation/Activity Status: oob/amb    Assessment/Plan:  48y Male status-post CABGx4, L radial POD#6  - Case and plan discussed with CTU Intensivist and CT Surgeon: Dr. Roldan   - Continue CTU supportive care and ongoing plan of care as per continuing CTU rounds.    - Continue DVT/GI prophylaxis  - Incentive Spirometry 10 times an hour  - Continue to advance physical activity as tolerated and continue PT/OT as directed  1. CAD: Continue ASA, no statin due to elevated lfts, start lopressor 12.5 mg po q12 hrs for tachycardiac, repeat EF 40-45%, restart isordil for radial artery graft vasospasm ppx.  2. A. Fib ppx: cont magnesium sulfate  IVPB 1 Gram(s) IV Intermittent every 12 hours  4. COPD/Hypoxia:  cont nebs, mucinex, lasix for fluid overload   5. DM/Glucose Control: D/C Lantus 16 with humalog 5 and restart home dose metformin/januvia w/sliding scale     Social Service Disposition:  home

## 2019-09-16 NOTE — PROGRESS NOTE ADULT - ASSESSMENT
PROBLEMS:  1.	cad - s/p CABG. ASA, Plavix - hold statins  ( elevated LFT )  2.	chronic systolic CHF - lasix 20 PO and spironolactone 25, ECHO today, EPS and VEST  3.	tachycardia - change cardizem 180 q24 and possibly loptessor 12.5  4.	anemia - observe  5.	transaminazemia - improving   6.	DM - tommorow start Januvia and metformin - stop lantus / novolog     PLAN  Neuro: move all 4 extremities. no sensory or motor deficits  Pain management.   oxyCODONE    IR 10 milliGRAM(s) Oral every 4 hours PRN  oxyCODONE    IR 5 milliGRAM(s) Oral every 4 hours PRN    Pulm: Wean off supplemental oxygen as able. Daily CXR. Encourage coughing, deep breathing and use of incentive spirometry.   guaiFENesin  milliGRAM(s) Oral every 12 hours  ipratropium 17 MICROgram(s) HFA Inhaler 2 Puff(s) Inhalation every 6 hours    Cardio: Monitor telemetry/alarms. Continue supportive care   diltiazem    Tablet 60 milliGRAM(s) Oral every 8 hours  furosemide    Tablet 20 milliGRAM(s) Oral daily  spironolactone 25 milliGRAM(s) Oral daily    GI: Continue stool softeners.    docusate sodium 100 milliGRAM(s) Oral three times a day  pantoprazole    Tablet 40 milliGRAM(s) Oral before breakfast  polyethylene glycol 3350 17 Gram(s) Oral daily  simethicone 80 milliGRAM(s) Chew two times a day PRN    Nutrition: Continue present diet  Endocrine and glucose control:   colchicine 0.6 milliGRAM(s) Oral every 12 hours  dextrose 40% Gel 15 Gram(s) Oral once PRN  dextrose 50% Injectable 50 milliLiter(s) IV Push every 15 minutes  glucagon  Injectable 1 milliGRAM(s) IntraMuscular once PRN  insulin glargine Injectable (LANTUS) 16 Unit(s) SubCutaneous every morning  insulin lispro (HumaLOG) corrective regimen sliding scale   SubCutaneous three times a day before meals  insulin lispro Injectable (HumaLOG) 5 Unit(s) SubCutaneous three times a day before meals    Renal: monitor urine output, supplement electrolytes as needed,     Vasc: Heparin SC and/or SCDs for DVT prophylaxis  aspirin enteric coated 81 milliGRAM(s) Oral daily  clopidogrel Tablet 75 milliGRAM(s) Oral daily  heparin  Injectable 5000 Unit(s) SubCutaneous two times a day    ID: Stable, no fever , no chills. Off antibiotics.  abacavir 600 mG/dolutegravir 50 mG/lamiVUDine 300 mG 1 Tablet(s) Oral daily    Therapy: OOB/ambulate  Disposition: start planing discharge home or placement    Pertinent clinical, laboratory, radiographic, hemodynamic, echocardiographic, respiratory data, microbiologic data and chart were reviewed and analyzed frequently throughout the course of the day and night. GI and DVT prophylaxis, glycemic control, head of bed elevation and skin care issues were addressed.  Patient seen, examined and plan discussed with CT Surgery / CTICU team during rounds.

## 2019-09-16 NOTE — PROGRESS NOTE ADULT - SUBJECTIVE AND OBJECTIVE BOX
CTU Attending Progress Daily Note     16 Sep 2019 10:13  Admited 09-01-19, Hospital Day 15d  POD# - 6    HPI:  47 y/o M with PMH of HIV (on treatment), DM, DLD, presents to the ED for left tooth pain for 1 week. He says the pain is left sided and radiates up to his head. It is shock-like and pulsatile in nature. Originates at angle of jaw. Made worse by chewing food and palpation of outside of his jaw. He was recently seen by dentist and had a filling placed and was told he would need to have his left wisdom teeth extracted. He has an appointment scheduled with them for Tuesday. He had a fever at home to up to 105 since last night. He took motrin 800mg 10 am and tylenol 500mg 3 pm with minimal relieft of his pain. He went to a walk in clinic where he received amoxicillin and told to come to the ED. He denies changes in vision or hearing, denies neck pain or cough, no SOB or dysphagia/odynophagia. He denies chest pain, SOB, palpitations, abdominal pain, nausea/vomiting/constipation, urinary symptoms or lower extremity swelling. He has HIV with 0 viral load and normal CD4, as per patient.     In ED, STEMI code was called for ST elevation on V1-V3. Stat enzymes were positive. Patient denied chest pain, palpitations, or SOB, and recently had a stress test and echo by Dr Griffin for abnormal EKG findings. He reports workup was negative. Seen by cardiology fellow, Dr Ken and upgrade to CCU approved by Dr Sloan. (02 Sep 2019 01:07)    Home Medications:  Janumet 50 mg-1000 mg oral tablet: 1 tab(s) orally 2 times a day (02 Sep 2019 01:15)  omeprazole 40 mg oral delayed release capsule: 1 cap(s) orally once a day (02 Sep 2019 01:15)  rosuvastatin 20 mg oral tablet: 1 tab(s) orally once a day (02 Sep 2019 01:15)  Triumeq oral tablet: 1 tab(s) orally once a day (02 Sep 2019 01:15)    FAMILY HISTORY:  FH: diabetes mellitus: mother and father  FH: heart failure: mother    PAST MEDICAL & SURGICAL HISTORY:  Diabetes  Hypercholesteremia  HIV (human immunodeficiency virus infection)  History of appendectomy    Interval event for past 24 hr:  BRISEIDA SIMON  48y had no event.   Current Complains:  BRISEIDA SIMON has no new complains  Allergies    No Known Allergies    Intolerances      OBJECTIVE:  Vitals last 24 hrs  T(C): 36.9 (09-16-19 @ 04:00), Max: 37.2 (09-15-19 @ 12:00)  T(F): 98.5 (09-16-19 @ 04:00), Max: 98.9 (09-15-19 @ 12:00)  HR: 100 (09-16-19 @ 07:00) (96 - 112)  BP: 120/64 (09-16-19 @ 07:00) (91/53 - 125/64)  ABP: --  ABP(mean): --  RR: 21 (09-16-19 @ 07:00) (16 - 23)  SpO2: 99% (09-16-19 @ 07:00) (94% - 99%)      09-15-19 @ 07:01  -  09-16-19 @ 07:00  --------------------------------------------------------  IN:    IV PiggyBack: 200 mL    Oral Fluid: 780 mL  Total IN: 980 mL    OUT:    Voided: 2900 mL  Total OUT: 2900 mL    Total NET: -1920 mL          CAPILLARY BLOOD GLUCOSE      POCT Blood Glucose.: 141 mg/dL (16 Sep 2019 06:59)  POCT Blood Glucose.: 120 mg/dL (15 Sep 2019 21:13)  POCT Blood Glucose.: 125 mg/dL (15 Sep 2019 15:50)  POCT Blood Glucose.: 117 mg/dL (15 Sep 2019 10:56)    LABS:                          9.6    7.93  )-----------( 211      ( 16 Sep 2019 04:00 )             29.0     Hemoglobin: 9.6 g/dL (09-16 @ 04:00)  Hemoglobin: 9.4 g/dL (09-15 @ 01:30)  Hemoglobin: 7.9 g/dL (09-14 @ 01:45)    09-16    132<L>  |  92<L>  |  12  ----------------------------<  127<H>  3.6   |  25  |  0.6<L>    Ca    8.9      16 Sep 2019 04:00  Mg     2.0     09-16    TPro  7.1  /  Alb  4.4  /  TBili  1.4<H>  /  DBili  x   /  AST  33  /  ALT  176<H>  /  AlkPhos  65  09-16    Creatinine, Serum: 0.6 mg/dL (09-16 @ 04:00)  Creatinine, Serum: 0.7 mg/dL (09-15 @ 01:30)  Creatinine, Serum: 0.7 mg/dL (09-14 @ 01:45)  Creatinine, Serum: 0.8 mg/dL (09-13 @ 03:35)  Creatinine, Serum: 0.9 mg/dL (09-12 @ 12:27)          HOSPITAL MEDICATIONS:  MEDICATIONS  (STANDING):  abacavir 600 mG/dolutegravir 50 mG/lamiVUDine 300 mG 1 Tablet(s) Oral daily  aspirin enteric coated 81 milliGRAM(s) Oral daily  clopidogrel Tablet 75 milliGRAM(s) Oral daily  colchicine 0.6 milliGRAM(s) Oral every 12 hours  dextrose 5%. 1000 milliLiter(s) (50 mL/Hr) IV Continuous <Continuous>  dextrose 50% Injectable 50 milliLiter(s) IV Push every 15 minutes  diltiazem    Tablet 60 milliGRAM(s) Oral every 8 hours  docusate sodium 100 milliGRAM(s) Oral three times a day  furosemide    Tablet 20 milliGRAM(s) Oral daily  guaiFENesin  milliGRAM(s) Oral every 12 hours  heparin  Injectable 5000 Unit(s) SubCutaneous two times a day  insulin glargine Injectable (LANTUS) 16 Unit(s) SubCutaneous every morning  insulin lispro (HumaLOG) corrective regimen sliding scale   SubCutaneous three times a day before meals  insulin lispro Injectable (HumaLOG) 5 Unit(s) SubCutaneous three times a day before meals  ipratropium 17 MICROgram(s) HFA Inhaler 2 Puff(s) Inhalation every 6 hours  magnesium sulfate  IVPB 1 Gram(s) IV Intermittent every 12 hours  pantoprazole    Tablet 40 milliGRAM(s) Oral before breakfast  polyethylene glycol 3350 17 Gram(s) Oral daily  sodium chloride 0.9%. 1000 milliLiter(s) (20 mL/Hr) IV Continuous <Continuous>  spironolactone 25 milliGRAM(s) Oral daily    MEDICATIONS  (PRN):  dextrose 40% Gel 15 Gram(s) Oral once PRN Blood Glucose LESS THAN 70 milliGRAM(s)/deciliter  glucagon  Injectable 1 milliGRAM(s) IntraMuscular once PRN Glucose LESS THAN 70 milligrams/deciliter  oxyCODONE    IR 10 milliGRAM(s) Oral every 4 hours PRN Severe Pain (7 - 10)  oxyCODONE    IR 5 milliGRAM(s) Oral every 4 hours PRN Moderate Pain (4 - 6)  simethicone 80 milliGRAM(s) Chew two times a day PRN Gas      REVIEW OF SYSTEMS:  CONSTITUTIONAL: [X] all negative; [ ] weakness, [ ] fevers, [ ] chills  EYES/ENT: [X] all negative; [ ] visual changes, [ ] vertigo, [ ] throat pain, [ ] eye pain  NECK: [X] all negative; [ ] pain, [ ] stiffness  RESPIRATORY: [ ] all negative, [x ] cough, [ ] wheezing, [ ] hemoptysis, [x ] shortness of breath  CARDIOVASCULAR: [ ] all negative; [x ] chest pain, [ ] palpitations, [ ] orthopnea  GASTROINTESTINAL: [X] all negative; [ ]abdominal pain, [ ] nausea, [ ] vomiting, [ ] hematemesis, [ ] diarrhea, [ ] constipation, [ ] melena, [ ] hematochezia.  GENITOURINARY: [X] all negative; [ ] dysuria, [ ] frequency, [ ] hematuria  NEUROLOGICAL: [X] all negative; [ ] numbness, [ ] weakness, [ ] paresthesias  MUSCULOSKELETAL: [X] all negative, [ ] joint pain, [ ] joint swelling, [ ] joint redness, [ ] bone pain  SKIN: [X] all negative; [ ] itching, [ ] burning, [ ] rashes, [ ] lesions   All other review of systems is negative unless indicated above.    [  ] Unable to assess ROS because     PHYSICAL EXAM:          CONSTITUTIONAL: Well-developed; well-nourished; in no acute distress.   	SKIN: warm, dry, no rashes or lesions  	HEENT: Atraumatic. Normocephalic. PERRL. Moist membranes, no conj injection, sclera clear  	NECK: Supple; non tender.  No JVD. No lymphadenopathy.  	CARD: Normal S1, S2. Rate and Rhythm are regular. No murmurs.  	RESP: Good air entry bilaterally, no wheezes, no rales no rhonchi.  	ABD: Soft, not tender, not distended, no CVA ttp no rebound no guarding, bowel sounds present  	EXT: Normal ROM.  No clubbing, no cyanosis, no pedal edema, no calf pain b/l, Peripheral pulses intact.  	LYMPH: No acute cervical adenopathy.  	NEURO: Alert, awake, motor 5/5 R, 5/5 L, sensation intact bilat, CN 2-12 intact,          PSYCH: Cooperative, appropriate. Alert & oriented x 3    RADIOLOGY:  X Reviewed and interpreted by me  CxR from 09-16-19 shows mild congestion, no pneumothorax, no effusion, no cardiomegaly,       ECG:  X Reviewed and interpreted by me - NSR, QTc - 468

## 2019-09-16 NOTE — PROGRESS NOTE ADULT - NSHPATTENDINGPLANDISCUSS_GEN_ALL_CORE
CTU
Medicine floor resident
CCU
CTU
CTU
Medicine floor resident
Medicine floor resident
CT Surgery / CTICU team during rounds.
CT Surgery / CTICU team during rounds.

## 2019-09-16 NOTE — CHART NOTE - NSCHARTNOTEFT_GEN_A_CORE
Registered Dietitian Follow-Up     Patient Profile Reviewed                           Yes [x]   No []     Nutrition History Previously Obtained        Yes [x]  No []       Pertinent Subjective Information: pt w/ improved appetite. Documented po intake %. Off BiPAP     Pertinent Medical Interventions: POD#6 s/p CABG.      Diet order:  CHO (no snack) DASH/TLC, 1L fluid restriction      Anthropometrics:  Ht. 172.72 cm   - Wt. 84.8 kg (9/3) vs 94 kg (9/11) vs. 85.5 kg (9/16) - will cont to monitor  - %wt change  - BMI 28.4   - IBW: 70 kg     Pertinent Lab Data: (9/16) H/H 9.6/29.0  Na 132  Cr 0.6  glucose 127      Pertinent Meds: plavix, heparin, triumeq, NaCl 0.9%, insulin regular infusion, colace, ppi, miralax     Physical Findings:  - Appearance: more alert   - GI function: LBM 9/15  - Tubes:  - Oral/Mouth cavity: Denies chewing/swallowing difficulty   - Skin: BS 20 surgical incision.      Nutrition Requirements  Weight Used: 84.8 kg needs cont from RD assessment 9/9     Estimated Energy Needs    Continue [x]  Adjust [] 2040 - 2210 kcals/day (MSJ x 1.2 - 1.3 AF)  Adjusted Energy Recommendations:   kcal/day        Estimated Protein Needs    Continue [x]  Adjust [] 85 - 102 gms/kg (1.0 - 1.2 gm/kg lean towards higher end after CABG)  Adjusted Protein Recommendations:   gm/day        Estimated Fluid Needs        Continue x[]  Adjust [] per CTU team  Adjusted Fluid Recommendations:   mL/day     Nutrient Intake: improved, meeting needs        [] Previous Nutrition Diagnosis:  inadequate oral intake             [] Ongoing          [x] Resolved    [] No active nutrition diagnosis identified at this time    Nutrition Intervention meal/snack,      Goal/Expected Outcome: Pt to consume >75% of meal tray in 7 days     Indicator/Monitoring: RD to monitor energy intake, diet order, body comp, NFPF     Recommendation: Add Dysphagia 3 soft thin liquids to current diet order.

## 2019-09-17 VITALS
TEMPERATURE: 97 F | RESPIRATION RATE: 18 BRPM | OXYGEN SATURATION: 100 % | DIASTOLIC BLOOD PRESSURE: 52 MMHG | SYSTOLIC BLOOD PRESSURE: 91 MMHG | HEART RATE: 100 BPM

## 2019-09-17 LAB
ALBUMIN SERPL ELPH-MCNC: 4.2 G/DL — SIGNIFICANT CHANGE UP (ref 3.5–5.2)
ALP SERPL-CCNC: 71 U/L — SIGNIFICANT CHANGE UP (ref 30–115)
ALT FLD-CCNC: 118 U/L — HIGH (ref 0–41)
ANION GAP SERPL CALC-SCNC: 14 MMOL/L — SIGNIFICANT CHANGE UP (ref 7–14)
AST SERPL-CCNC: 26 U/L — SIGNIFICANT CHANGE UP (ref 0–41)
BILIRUB SERPL-MCNC: 0.9 MG/DL — SIGNIFICANT CHANGE UP (ref 0.2–1.2)
BUN SERPL-MCNC: 15 MG/DL — SIGNIFICANT CHANGE UP (ref 10–20)
CALCIUM SERPL-MCNC: 9.1 MG/DL — SIGNIFICANT CHANGE UP (ref 8.5–10.1)
CHLORIDE SERPL-SCNC: 95 MMOL/L — LOW (ref 98–110)
CO2 SERPL-SCNC: 22 MMOL/L — SIGNIFICANT CHANGE UP (ref 17–32)
CREAT SERPL-MCNC: 0.7 MG/DL — SIGNIFICANT CHANGE UP (ref 0.7–1.5)
GLUCOSE BLDC GLUCOMTR-MCNC: 100 MG/DL — HIGH (ref 70–99)
GLUCOSE BLDC GLUCOMTR-MCNC: 110 MG/DL — HIGH (ref 70–99)
GLUCOSE BLDC GLUCOMTR-MCNC: 82 MG/DL — SIGNIFICANT CHANGE UP (ref 70–99)
GLUCOSE SERPL-MCNC: 117 MG/DL — HIGH (ref 70–99)
HCT VFR BLD CALC: 28.2 % — LOW (ref 42–52)
HGB BLD-MCNC: 9.5 G/DL — LOW (ref 14–18)
MAGNESIUM SERPL-MCNC: 1.8 MG/DL — SIGNIFICANT CHANGE UP (ref 1.8–2.4)
MCHC RBC-ENTMCNC: 29 PG — SIGNIFICANT CHANGE UP (ref 27–31)
MCHC RBC-ENTMCNC: 33.7 G/DL — SIGNIFICANT CHANGE UP (ref 32–37)
MCV RBC AUTO: 86 FL — SIGNIFICANT CHANGE UP (ref 80–94)
NRBC # BLD: 0 /100 WBCS — SIGNIFICANT CHANGE UP (ref 0–0)
PLATELET # BLD AUTO: 204 K/UL — SIGNIFICANT CHANGE UP (ref 130–400)
POTASSIUM SERPL-MCNC: 3.8 MMOL/L — SIGNIFICANT CHANGE UP (ref 3.5–5)
POTASSIUM SERPL-SCNC: 3.8 MMOL/L — SIGNIFICANT CHANGE UP (ref 3.5–5)
PROT SERPL-MCNC: 6.7 G/DL — SIGNIFICANT CHANGE UP (ref 6–8)
RBC # BLD: 3.28 M/UL — LOW (ref 4.7–6.1)
RBC # FLD: 13.9 % — SIGNIFICANT CHANGE UP (ref 11.5–14.5)
SODIUM SERPL-SCNC: 131 MMOL/L — LOW (ref 135–146)
WBC # BLD: 7.95 K/UL — SIGNIFICANT CHANGE UP (ref 4.8–10.8)
WBC # FLD AUTO: 7.95 K/UL — SIGNIFICANT CHANGE UP (ref 4.8–10.8)

## 2019-09-17 PROCEDURE — 99232 SBSQ HOSP IP/OBS MODERATE 35: CPT

## 2019-09-17 PROCEDURE — 93010 ELECTROCARDIOGRAM REPORT: CPT

## 2019-09-17 PROCEDURE — 71045 X-RAY EXAM CHEST 1 VIEW: CPT | Mod: 26

## 2019-09-17 RX ORDER — METOPROLOL TARTRATE 50 MG
12.5 TABLET ORAL ONCE
Refills: 0 | Status: COMPLETED | OUTPATIENT
Start: 2019-09-17 | End: 2019-09-17

## 2019-09-17 RX ORDER — ROSUVASTATIN CALCIUM 5 MG/1
1 TABLET ORAL
Qty: 0 | Refills: 0 | DISCHARGE

## 2019-09-17 RX ORDER — ABACAVIR SULFATE, DOLUTEGRAVIR SODIUM, LAMIVUDINE 60-5-30 MG
1 KIT ORAL
Qty: 0 | Refills: 0 | DISCHARGE
Start: 2019-09-17

## 2019-09-17 RX ORDER — COLCHICINE 0.6 MG
1 TABLET ORAL
Qty: 28 | Refills: 0
Start: 2019-09-17 | End: 2019-09-30

## 2019-09-17 RX ORDER — METOPROLOL TARTRATE 50 MG
25 TABLET ORAL
Refills: 0 | Status: DISCONTINUED | OUTPATIENT
Start: 2019-09-17 | End: 2019-09-17

## 2019-09-17 RX ORDER — ALBUMIN HUMAN 25 %
500 VIAL (ML) INTRAVENOUS ONCE
Refills: 0 | Status: DISCONTINUED | OUTPATIENT
Start: 2019-09-17 | End: 2019-09-17

## 2019-09-17 RX ORDER — PANTOPRAZOLE SODIUM 20 MG/1
1 TABLET, DELAYED RELEASE ORAL
Qty: 30 | Refills: 0
Start: 2019-09-17

## 2019-09-17 RX ORDER — ABACAVIR SULFATE, DOLUTEGRAVIR SODIUM, LAMIVUDINE 60-5-30 MG
1 KIT ORAL
Qty: 0 | Refills: 0 | DISCHARGE

## 2019-09-17 RX ORDER — ASPIRIN/CALCIUM CARB/MAGNESIUM 324 MG
1 TABLET ORAL
Qty: 30 | Refills: 0
Start: 2019-09-17

## 2019-09-17 RX ORDER — OMEPRAZOLE 10 MG/1
1 CAPSULE, DELAYED RELEASE ORAL
Qty: 0 | Refills: 0 | DISCHARGE

## 2019-09-17 RX ORDER — CEPHALEXIN 500 MG
1 CAPSULE ORAL
Qty: 14 | Refills: 0
Start: 2019-09-17 | End: 2019-09-23

## 2019-09-17 RX ORDER — METOPROLOL TARTRATE 50 MG
1 TABLET ORAL
Qty: 90 | Refills: 0
Start: 2019-09-17 | End: 2019-10-16

## 2019-09-17 RX ORDER — CEPHALEXIN 500 MG
500 CAPSULE ORAL EVERY 12 HOURS
Refills: 0 | Status: DISCONTINUED | OUTPATIENT
Start: 2019-09-17 | End: 2019-09-17

## 2019-09-17 RX ORDER — CLOPIDOGREL BISULFATE 75 MG/1
1 TABLET, FILM COATED ORAL
Qty: 30 | Refills: 0
Start: 2019-09-17 | End: 2019-10-16

## 2019-09-17 RX ORDER — POTASSIUM CHLORIDE 20 MEQ
20 PACKET (EA) ORAL ONCE
Refills: 0 | Status: COMPLETED | OUTPATIENT
Start: 2019-09-17 | End: 2019-09-17

## 2019-09-17 RX ORDER — DOCUSATE SODIUM 100 MG
1 CAPSULE ORAL
Qty: 90 | Refills: 0
Start: 2019-09-17 | End: 2019-10-16

## 2019-09-17 RX ADMIN — Medication 500 MILLIGRAM(S): at 13:42

## 2019-09-17 RX ADMIN — SPIRONOLACTONE 25 MILLIGRAM(S): 25 TABLET, FILM COATED ORAL at 05:40

## 2019-09-17 RX ADMIN — CLOPIDOGREL BISULFATE 75 MILLIGRAM(S): 75 TABLET, FILM COATED ORAL at 12:12

## 2019-09-17 RX ADMIN — Medication 600 MILLIGRAM(S): at 05:39

## 2019-09-17 RX ADMIN — Medication 12.5 MILLIGRAM(S): at 12:11

## 2019-09-17 RX ADMIN — ISOSORBIDE DINITRATE 5 MILLIGRAM(S): 5 TABLET ORAL at 06:19

## 2019-09-17 RX ADMIN — Medication 20 MILLIEQUIVALENT(S): at 08:45

## 2019-09-17 RX ADMIN — Medication 12.5 MILLIGRAM(S): at 05:39

## 2019-09-17 RX ADMIN — METFORMIN HYDROCHLORIDE 1000 MILLIGRAM(S): 850 TABLET ORAL at 05:39

## 2019-09-17 RX ADMIN — Medication 0.6 MILLIGRAM(S): at 05:39

## 2019-09-17 RX ADMIN — PANTOPRAZOLE SODIUM 40 MILLIGRAM(S): 20 TABLET, DELAYED RELEASE ORAL at 06:19

## 2019-09-17 RX ADMIN — Medication 81 MILLIGRAM(S): at 12:11

## 2019-09-17 RX ADMIN — Medication 100 GRAM(S): at 05:41

## 2019-09-17 RX ADMIN — Medication 12.5 MILLIGRAM(S): at 08:45

## 2019-09-17 RX ADMIN — Medication 20 MILLIGRAM(S): at 05:41

## 2019-09-17 RX ADMIN — HEPARIN SODIUM 5000 UNIT(S): 5000 INJECTION INTRAVENOUS; SUBCUTANEOUS at 05:53

## 2019-09-17 NOTE — PROGRESS NOTE ADULT - REASON FOR ADMISSION
Fevers and left-sided tooth/facial pain

## 2019-09-17 NOTE — PROGRESS NOTE ADULT - SUBJECTIVE AND OBJECTIVE BOX
OPERATIVE PROCEDURE(s):                POD #                       48yMale  SURGEON(s): VIDHI Roldan  SUBJECTIVE ASSESSMENT:   Vital Signs Last 24 Hrs  T(F): 98.6 (17 Sep 2019 04:00), Max: 98.6 (17 Sep 2019 04:00)  HR: 97 (17 Sep 2019 07:00) (95 - 117)  BP: 103/62 (17 Sep 2019 07:00) (92/61 - 129/88)  BP(mean): 77 (17 Sep 2019 07:00) (72 - 102)  ABP: --  ABP(mean): --  RR: 18 (17 Sep 2019 04:00) (18 - 20)  SpO2: 99% (17 Sep 2019 07:00) (96% - 100%)  CVP(mm Hg): --  CVP(cm H2O): --  CO: --  CI: --  PA: --  SVR: --    I&O's Detail    16 Sep 2019 07:01  -  17 Sep 2019 07:00  --------------------------------------------------------  IN:    IV PiggyBack: 100 mL    Oral Fluid: 240 mL  Total IN: 340 mL    OUT:    Voided: 2800 mL  Total OUT: 2800 mL        Net:   I&O's Detail    15 Sep 2019 07:01  -  16 Sep 2019 07:00  --------------------------------------------------------  Total NET: -1920 mL      16 Sep 2019 07:01  -  17 Sep 2019 07:00  --------------------------------------------------------  Total NET: -2460 mL        CAPILLARY BLOOD GLUCOSE      POCT Blood Glucose.: 110 mg/dL (17 Sep 2019 06:25)  POCT Blood Glucose.: 103 mg/dL (16 Sep 2019 15:55)  POCT Blood Glucose.: 128 mg/dL (16 Sep 2019 11:26)    Physical Exam:  General: NAD; A&Ox3/Patient is intubated and sedated  Cardiac: S1/S2, RRR, no murmur, no rubs  Lungs: unlabored respirations, CTA b/l, no wheeze, no rales, no crackles  Abdomen: Soft/NT/ND; positive bowel sounds x 4  Sternum: Intact, no click, incision healing well with no drainage  Incisions: Incisions clean/dry/intact  Extremities: No edema b/l lower extremities; good capillary refill; no cyanosis; palpable 1+ pedal pulses b/l    Central Venous Catheter: Yes[]  No[] , If Yes indication:           Day #  Lane Catheter: Yes  [] , No  [] , If yes indication:                      Day #  NGT: Yes [] No [] ,    If Yes Placement:                                     Day #  EPICARDIAL WIRES:  [] YES [] NO                                              Day #  BOWEL MOVEMENT:  [] YES [] NO, If No, Timing since last BM:      Day #  CHEST TUBE(Left/Right):  [] YES [] NO, If yes -  AIR LEAKS:  [] YES [] NO        LABS:                        9.5<L>  7.95  )-----------( 204      ( 17 Sep 2019 03:30 )             28.2<L>                        9.6<L>  7.93  )-----------( 211      ( 16 Sep 2019 04:00 )             29.0<L>    09-17    131<L>  |  95<L>  |  15  ----------------------------<  117<H>  3.8   |  22  |  0.7  09-16    132<L>  |  92<L>  |  12  ----------------------------<  127<H>  3.6   |  25  |  0.6<L>    Ca    9.1      17 Sep 2019 03:30  Mg     1.8     09-17    TPro  6.7 [6.0 - 8.0]  /  Alb  4.2 [3.5 - 5.2]  /  TBili  0.9 [0.2 - 1.2]  /  DBili  x   /  AST  26 [0 - 41]  /  ALT  118<H> [0 - 41]  /  AlkPhos  71 [30 - 115]  09-17          RADIOLOGY & ADDITIONAL TESTS:  CXR:  EKG:  MEDICATIONS  (STANDING):  abacavir 600 mG/dolutegravir 50 mG/lamiVUDine 300 mG 1 Tablet(s) Oral daily  aspirin enteric coated 81 milliGRAM(s) Oral daily  clopidogrel Tablet 75 milliGRAM(s) Oral daily  colchicine 0.6 milliGRAM(s) Oral every 12 hours  dextrose 5%. 1000 milliLiter(s) (50 mL/Hr) IV Continuous <Continuous>  dextrose 50% Injectable 50 milliLiter(s) IV Push every 15 minutes  docusate sodium 100 milliGRAM(s) Oral three times a day  furosemide    Tablet 20 milliGRAM(s) Oral daily  guaiFENesin  milliGRAM(s) Oral every 12 hours  heparin  Injectable 5000 Unit(s) SubCutaneous two times a day  insulin lispro (HumaLOG) corrective regimen sliding scale   SubCutaneous three times a day before meals  ipratropium 17 MICROgram(s) HFA Inhaler 2 Puff(s) Inhalation every 6 hours  isosorbide   dinitrate Tablet (ISORDIL) 5 milliGRAM(s) Oral three times a day  magnesium sulfate  IVPB 1 Gram(s) IV Intermittent every 12 hours  metFORMIN 1000 milliGRAM(s) Oral every 12 hours  metoprolol tartrate 12.5 milliGRAM(s) Oral two times a day  pantoprazole    Tablet 40 milliGRAM(s) Oral before breakfast  polyethylene glycol 3350 17 Gram(s) Oral daily  sitaGLIPtin 100 milliGRAM(s) Oral daily  sodium chloride 0.9%. 1000 milliLiter(s) (20 mL/Hr) IV Continuous <Continuous>  spironolactone 25 milliGRAM(s) Oral daily    MEDICATIONS  (PRN):  dextrose 40% Gel 15 Gram(s) Oral once PRN Blood Glucose LESS THAN 70 milliGRAM(s)/deciliter  glucagon  Injectable 1 milliGRAM(s) IntraMuscular once PRN Glucose LESS THAN 70 milligrams/deciliter  oxyCODONE    IR 10 milliGRAM(s) Oral every 4 hours PRN Severe Pain (7 - 10)  oxyCODONE    IR 5 milliGRAM(s) Oral every 4 hours PRN Moderate Pain (4 - 6)  simethicone 80 milliGRAM(s) Chew two times a day PRN Gas    HEPARIN:  [] YES [] NO  Dose: XX UNITS/HR UNITS Q8H  LOVENOX:[] YES [] NO  Dose: XX mg Q24H  COUMADIN: []  YES [] NO  Dose: XX mg  Q24H  SCD's: YES b/l  GI Prophylaxis: Protonix [], Pepcid [], None [], (Contra-indication:.....)    Post-Op Beta-Blockers: Yes [], No[], If No, then contraindication:  Post-Op Aspirin: Yes [],  No [], If No, then contraindication:  Post-Op Statin: Yes [], No[], If No, then contraindication:  Allergies    No Known Allergies    Intolerances      Ambulation/Activity Status:    Assessment/Plan:  48y Male status-post .....  - Case and plan discussed with CTU Intensivist and CT Surgeon - Dr. Roldan/Lata/Patrizia   - Continue CTU supportive care    - Continue DVT/GI prophylaxis  - Incentive Spirometry 10 times an hour  - Continue to advance physical activity as tolerated and continue PT/OT as directed  1. CAD: Continue ASA, statin, BB  2. HTN:   3. A. Fib:   4. COPD/Hypoxia:   5. DM/Glucose Control:     Social Service Disposition: OPERATIVE PROCEDURE(s):     CABGx4, Left radial harvest           POD #    7                   48yMale  SURGEON(s): VIDHI Roldan  SUBJECTIVE ASSESSMENT: pt seen and examined. no acute complaints at this time.   Vital Signs Last 24 Hrs  T(F): 98.6 (17 Sep 2019 04:00), Max: 98.6 (17 Sep 2019 04:00)  HR: 97 (17 Sep 2019 07:00) (95 - 117)  BP: 103/62 (17 Sep 2019 07:00) (92/61 - 129/88)  BP(mean): 77 (17 Sep 2019 07:00) (72 - 102)  RR: 18 (17 Sep 2019 04:00) (18 - 20)  SpO2: 99% (17 Sep 2019 07:00) (96% - 100%)    I&O's Detail    16 Sep 2019 07:01  -  17 Sep 2019 07:00  --------------------------------------------------------  IN:    IV PiggyBack: 100 mL    Oral Fluid: 240 mL  Total IN: 340 mL    OUT:    Voided: 2800 mL  Total OUT: 2800 mL        Net:   I&O's Detail    15 Sep 2019 07:01  -  16 Sep 2019 07:00  --------------------------------------------------------  Total NET: -1920 mL      16 Sep 2019 07:01  -  17 Sep 2019 07:00  --------------------------------------------------------  Total NET: -2460 mL        CAPILLARY BLOOD GLUCOSE      POCT Blood Glucose.: 110 mg/dL (17 Sep 2019 06:25)  POCT Blood Glucose.: 103 mg/dL (16 Sep 2019 15:55)  POCT Blood Glucose.: 128 mg/dL (16 Sep 2019 11:26)    Physical Exam:  General: NAD; A&Ox3  Cardiac: S1/S2, RRR, no murmur, no rubs  Lungs: unlabored respirations, CTA b/l, no wheeze, no rales, no crackles  Abdomen: Soft/NT/ND; positive bowel sounds x 4  Sternum: Intact, no click, incision healing well with no drainage  Incisions: Incisions clean/dry/intact  Extremities: No edema b/l lower extremities; good capillary refill; no cyanosis; palpable 1+ pedal pulses b/l    Central Venous Catheter: Yes[]  No[x] , If Yes indication:           Day #  Lane Catheter: Yes  [] , No  [x] , If yes indication:                      Day #  NGT: Yes [] No [x] ,    If Yes Placement:                                     Day #  EPICARDIAL WIRES:  [] YES [x] NO                                              Day #  BOWEL MOVEMENT:  [x] YES [] NO, If No, Timing since last BM:      Day #  CHEST TUBE(Left/Right):  [] YES [x] NO, If yes -  AIR LEAKS:  [] YES [] NO        LABS:                        9.5<L>  7.95  )-----------( 204      ( 17 Sep 2019 03:30 )             28.2<L>                        9.6<L>  7.93  )-----------( 211      ( 16 Sep 2019 04:00 )             29.0<L>    09-17    131<L>  |  95<L>  |  15  ----------------------------<  117<H>  3.8   |  22  |  0.7  09-16    132<L>  |  92<L>  |  12  ----------------------------<  127<H>  3.6   |  25  |  0.6<L>    Ca    9.1      17 Sep 2019 03:30  Mg     1.8     09-17    TPro  6.7 [6.0 - 8.0]  /  Alb  4.2 [3.5 - 5.2]  /  TBili  0.9 [0.2 - 1.2]  /  DBili  x   /  AST  26 [0 - 41]  /  ALT  118<H> [0 - 41]  /  AlkPhos  71 [30 - 115]  09-17          RADIOLOGY & ADDITIONAL TESTS:  CXR: < from: Xray Chest 1 View- PORTABLE-Routine (09.17.19 @ 06:02) >  Impression:      Unchanged in bibasilar opacities/atelectasis.    < end of copied text >    EKG: < from: 12 Lead ECG (09.17.19 @ 07:49) >  Ventricular Rate 101 BPM    Atrial Rate 101 BPM    P-R Interval 148 ms    QRS Duration 110 ms    Q-T Interval 362 ms    QTC Calculation(Bezet) 469 ms    P Axis 20 degrees    R Axis 50 degrees    T Axis 16 degrees    Diagnosis Line Sinus tachycardia  Possible Left atrial enlargement  Anteroseptal infarct , age undetermined  Abnormal ECG    < end of copied text >    MEDICATIONS  (STANDING):  abacavir 600 mG/dolutegravir 50 mG/lamiVUDine 300 mG 1 Tablet(s) Oral daily  aspirin enteric coated 81 milliGRAM(s) Oral daily  clopidogrel Tablet 75 milliGRAM(s) Oral daily  colchicine 0.6 milliGRAM(s) Oral every 12 hours  dextrose 5%. 1000 milliLiter(s) (50 mL/Hr) IV Continuous <Continuous>  dextrose 50% Injectable 50 milliLiter(s) IV Push every 15 minutes  docusate sodium 100 milliGRAM(s) Oral three times a day  furosemide    Tablet 20 milliGRAM(s) Oral daily  guaiFENesin  milliGRAM(s) Oral every 12 hours  heparin  Injectable 5000 Unit(s) SubCutaneous two times a day  insulin lispro (HumaLOG) corrective regimen sliding scale   SubCutaneous three times a day before meals  ipratropium 17 MICROgram(s) HFA Inhaler 2 Puff(s) Inhalation every 6 hours  isosorbide   dinitrate Tablet (ISORDIL) 5 milliGRAM(s) Oral three times a day  magnesium sulfate  IVPB 1 Gram(s) IV Intermittent every 12 hours  metFORMIN 1000 milliGRAM(s) Oral every 12 hours  metoprolol tartrate 12.5 milliGRAM(s) Oral two times a day  pantoprazole    Tablet 40 milliGRAM(s) Oral before breakfast  polyethylene glycol 3350 17 Gram(s) Oral daily  sitaGLIPtin 100 milliGRAM(s) Oral daily  sodium chloride 0.9%. 1000 milliLiter(s) (20 mL/Hr) IV Continuous <Continuous>  spironolactone 25 milliGRAM(s) Oral daily    MEDICATIONS  (PRN):  dextrose 40% Gel 15 Gram(s) Oral once PRN Blood Glucose LESS THAN 70 milliGRAM(s)/deciliter  glucagon  Injectable 1 milliGRAM(s) IntraMuscular once PRN Glucose LESS THAN 70 milligrams/deciliter  oxyCODONE    IR 10 milliGRAM(s) Oral every 4 hours PRN Severe Pain (7 - 10)  oxyCODONE    IR 5 milliGRAM(s) Oral every 4 hours PRN Moderate Pain (4 - 6)  simethicone 80 milliGRAM(s) Chew two times a day PRN Gas    HEPARIN:  [x] YES [] NO  Dose: 5000 UNITS Q8H  LOVENOX:[] YES [x] NO  Dose: XX mg Q24H  COUMADIN: []  YES [x] NO  Dose: XX mg  Q24H  SCD's: YES b/l  GI Prophylaxis: Protonix [x], Pepcid [], None [], (Contra-indication:.....)    Post-Op Beta-Blockers: Yes [x], No[], If No, then contraindication:  Post-Op Aspirin: Yes [x],  No [], If No, then contraindication:  Post-Op Statin: Yes [], No[x], If No, then contraindication: elevated lfts   Allergies    No Known Allergies    Intolerances      Ambulation/Activity Status: ambulate     Assessment/Plan:  48y Male status-post CABG x4 with left radial harvest POD#7 today.  - Case and plan discussed with CTU Intensivist and CT Surgeon - Dr. Roldan/Laat/Patrizia   - Continue CTU supportive care    - Continue DVT/GI prophylaxis  - Incentive Spirometry 10 times an hour  - Continue to advance physical activity as tolerated and continue PT/OT as directed  1. CAD: Continue ASA, BB, no statin due to elevated lfts   2. HTN: cont metoprolol increase to 25mg bid for heart rate control, d/c isordil for now  3. A. Fib prophylaxis: cont bb, mag 1g bid  4. COPD/Hypoxia: cont nebs, mucinex, lasix for fluid overload- d/c today   5. DM/Glucose Control: cont metformin, januvia, insulin sliding scale    Social Service Disposition:  home

## 2019-09-17 NOTE — PROGRESS NOTE ADULT - ASSESSMENT
PROBLEMS:  I spent 45 minutes of critical care time examining patient, reviewing vitals, labs, medications, imaging and discussing with the team goals of care to prevent life-threatening in this patient who is at high risk for deterioration or death due to:        PLAN  Neuro: move all 4 extremities. no sensory or motor deficits  Pain management.   oxyCODONE    IR 10 milliGRAM(s) Oral every 4 hours PRN  oxyCODONE    IR 5 milliGRAM(s) Oral every 4 hours PRN    Pulm: Wean off supplemental oxygen as able. Daily CXR. Encourage coughing, deep breathing and use of incentive spirometry.   guaiFENesin  milliGRAM(s) Oral every 12 hours  ipratropium 17 MICROgram(s) HFA Inhaler 2 Puff(s) Inhalation every 6 hours    Cardio: Monitor telemetry/alarms. Continue supportive care   furosemide    Tablet 20 milliGRAM(s) Oral daily  isosorbide   dinitrate Tablet (ISORDIL) 5 milliGRAM(s) Oral three times a day  metoprolol tartrate 12.5 milliGRAM(s) Oral two times a day  spironolactone 25 milliGRAM(s) Oral daily    GI: Continue stool softeners.    docusate sodium 100 milliGRAM(s) Oral three times a day  pantoprazole    Tablet 40 milliGRAM(s) Oral before breakfast  polyethylene glycol 3350 17 Gram(s) Oral daily  simethicone 80 milliGRAM(s) Chew two times a day PRN    Nutrition: Continue present diet  Endocrine and glucose control:   colchicine 0.6 milliGRAM(s) Oral every 12 hours  dextrose 40% Gel 15 Gram(s) Oral once PRN  dextrose 50% Injectable 50 milliLiter(s) IV Push every 15 minutes  glucagon  Injectable 1 milliGRAM(s) IntraMuscular once PRN  insulin lispro (HumaLOG) corrective regimen sliding scale   SubCutaneous three times a day before meals  metFORMIN 1000 milliGRAM(s) Oral every 12 hours  sitaGLIPtin 100 milliGRAM(s) Oral daily    Renal: monitor urine output, supplement electrolytes as needed,     Vasc: Heparin SC and/or SCDs for DVT prophylaxis  aspirin enteric coated 81 milliGRAM(s) Oral daily  clopidogrel Tablet 75 milliGRAM(s) Oral daily  heparin  Injectable 5000 Unit(s) SubCutaneous two times a day    ID: Stable, no fever , no chills. Off antibiotics.  abacavir 600 mG/dolutegravir 50 mG/lamiVUDine 300 mG 1 Tablet(s) Oral daily    Tubes: Monitor Chest tube output  Therapy: OOB/ambulate  Disposition: start planing discharge home or placement    Pertinent clinical, laboratory, radiographic, hemodynamic, echocardiographic, respiratory data, microbiologic data and chart were reviewed and analyzed frequently throughout the course of the day and night. GI and DVT prophylaxis, glycemic control, head of bed elevation and skin care issues were addressed.  Patient seen, examined and plan discussed with CT Surgery / CTICU team during rounds.    [ ] The patient remains in critical and unstable condition, and requires ICU care and monitoring  [ ] The patient is improving but requires continued monitoring and adjustment of therapy PROBLEMS:  I spent 45 minutes of  time examining patient, reviewing vitals, labs, medications, imaging and discussing with the team goals of care to prevent life-threatening in this patient who is at high risk for deterioration or death due to:    1.	CAD - s/p CABG - increase lopressor to 25 mg q 8 stop isosorbide   2.	fluid overlaod - now resolved - stop PO lasix and aldactone  3.	DM - starting metformin and januvia today    PLAN  Neuro: move all 4 extremities. no sensory or motor deficits  Pain management.   oxyCODONE    IR 10 milliGRAM(s) Oral every 4 hours PRN  oxyCODONE    IR 5 milliGRAM(s) Oral every 4 hours PRN    Pulm: Wean off supplemental oxygen as able. Daily CXR. Encourage coughing, deep breathing and use of incentive spirometry.   guaiFENesin  milliGRAM(s) Oral every 12 hours  ipratropium 17 MICROgram(s) HFA Inhaler 2 Puff(s) Inhalation every 6 hours    Cardio: Monitor telemetry/alarms. Continue supportive care   furosemide    Tablet 20 milliGRAM(s) Oral daily  isosorbide   dinitrate Tablet (ISORDIL) 5 milliGRAM(s) Oral three times a day  metoprolol tartrate 12.5 milliGRAM(s) Oral two times a day  spironolactone 25 milliGRAM(s) Oral daily    GI: Continue stool softeners.    docusate sodium 100 milliGRAM(s) Oral three times a day  pantoprazole    Tablet 40 milliGRAM(s) Oral before breakfast  polyethylene glycol 3350 17 Gram(s) Oral daily  simethicone 80 milliGRAM(s) Chew two times a day PRN    Nutrition: Continue present diet  Endocrine and glucose control:   colchicine 0.6 milliGRAM(s) Oral every 12 hours  dextrose 40% Gel 15 Gram(s) Oral once PRN  dextrose 50% Injectable 50 milliLiter(s) IV Push every 15 minutes  glucagon  Injectable 1 milliGRAM(s) IntraMuscular once PRN  insulin lispro (HumaLOG) corrective regimen sliding scale   SubCutaneous three times a day before meals  metFORMIN 1000 milliGRAM(s) Oral every 12 hours  sitaGLIPtin 100 milliGRAM(s) Oral daily    Renal: monitor urine output, supplement electrolytes as needed,     Vasc: Heparin SC and/or SCDs for DVT prophylaxis  aspirin enteric coated 81 milliGRAM(s) Oral daily  clopidogrel Tablet 75 milliGRAM(s) Oral daily  heparin  Injectable 5000 Unit(s) SubCutaneous two times a day    ID: Stable, no fever , no chills. Off antibiotics.  abacavir 600 mG/dolutegravir 50 mG/lamiVUDine 300 mG 1 Tablet(s) Oral daily    Tubes: Monitor Chest tube output  Therapy: OOB/ambulate  Disposition: start planing discharge home or placement    Pertinent clinical, laboratory, radiographic, hemodynamic, echocardiographic, respiratory data, microbiologic data and chart were reviewed and analyzed frequently throughout the course of the day and night. GI and DVT prophylaxis, glycemic control, head of bed elevation and skin care issues were addressed.  Patient seen, examined and plan discussed with CT Surgery / CTICU team during rounds.    [ ] The patient remains in critical and unstable condition, and requires ICU care and monitoring  [ ] The patient is improving but requires continued monitoring and adjustment of therapy

## 2019-09-17 NOTE — PROGRESS NOTE ADULT - PROVIDER SPECIALTY LIST ADULT
CT Surgery
Cardiology
Critical Care
ENT
Infectious Disease
Internal Medicine
CT Surgery
CT Surgery
Internal Medicine
Infectious Disease
Infectious Disease

## 2019-09-17 NOTE — PROGRESS NOTE ADULT - SUBJECTIVE AND OBJECTIVE BOX
CTU Attending Progress Daily Note     17 Sep 2019 07:56  Admited 09-01-19, Hospital Day 16d  POD# -     HPI:  47 y/o M with PMH of HIV (on treatment), DM, DLD, presents to the ED for left tooth pain for 1 week. He says the pain is left sided and radiates up to his head. It is shock-like and pulsatile in nature. Originates at angle of jaw. Made worse by chewing food and palpation of outside of his jaw. He was recently seen by dentist and had a filling placed and was told he would need to have his left wisdom teeth extracted. He has an appointment scheduled with them for Tuesday. He had a fever at home to up to 105 since last night. He took motrin 800mg 10 am and tylenol 500mg 3 pm with minimal relieft of his pain. He went to a walk in clinic where he received amoxicillin and told to come to the ED. He denies changes in vision or hearing, denies neck pain or cough, no SOB or dysphagia/odynophagia. He denies chest pain, SOB, palpitations, abdominal pain, nausea/vomiting/constipation, urinary symptoms or lower extremity swelling. He has HIV with 0 viral load and normal CD4, as per patient.     In ED, STEMI code was called for ST elevation on V1-V3. Stat enzymes were positive. Patient denied chest pain, palpitations, or SOB, and recently had a stress test and echo by Dr Griffin for abnormal EKG findings. He reports workup was negative. Seen by cardiology fellow, Dr Ken and upgrade to CCU approved by Dr Sloan. (02 Sep 2019 01:07)    Home Medications:  Janumet 50 mg-1000 mg oral tablet: 1 tab(s) orally 2 times a day (02 Sep 2019 01:15)  omeprazole 40 mg oral delayed release capsule: 1 cap(s) orally once a day (02 Sep 2019 01:15)  rosuvastatin 20 mg oral tablet: 1 tab(s) orally once a day (02 Sep 2019 01:15)  Triumeq oral tablet: 1 tab(s) orally once a day (02 Sep 2019 01:15)    FAMILY HISTORY:  FH: diabetes mellitus: mother and father  FH: heart failure: mother    PAST MEDICAL & SURGICAL HISTORY:  Diabetes  Hypercholesteremia  HIV (human immunodeficiency virus infection)  History of appendectomy    Interval event for past 24 hr:  BRISEIDA SIMON  48y had no event.   Current Complains:  BRISEIDA SIMON has no new complains  Allergies    No Known Allergies    Intolerances      OBJECTIVE:  Vitals last 24 hrs  T(C): 37 (09-17-19 @ 04:00), Max: 37 (09-17-19 @ 04:00)  T(F): 98.6 (09-17-19 @ 04:00), Max: 98.6 (09-17-19 @ 04:00)  HR: 97 (09-17-19 @ 07:00) (95 - 117)  BP: 103/62 (09-17-19 @ 07:00) (92/61 - 129/88)  ABP: --  ABP(mean): --  RR: 18 (09-17-19 @ 04:00) (18 - 20)  SpO2: 99% (09-17-19 @ 07:00) (96% - 100%)  CVP(mm Hg): --  CI: --  PA: --  PA(direct): --  PCWP: --  SVR: --  PVR: --    09-16-19 @ 07:01  -  09-17-19 @ 07:00  --------------------------------------------------------  IN:    IV PiggyBack: 100 mL    Oral Fluid: 240 mL  Total IN: 340 mL    OUT:    Voided: 2800 mL  Total OUT: 2800 mL    Total NET: -2460 mL          CAPILLARY BLOOD GLUCOSE      POCT Blood Glucose.: 110 mg/dL (17 Sep 2019 06:25)  POCT Blood Glucose.: 103 mg/dL (16 Sep 2019 15:55)  POCT Blood Glucose.: 128 mg/dL (16 Sep 2019 11:26)    LABS:                          9.5    7.95  )-----------( 204      ( 17 Sep 2019 03:30 )             28.2     Hemoglobin: 9.5 g/dL (09-17 @ 03:30)  Hemoglobin: 9.6 g/dL (09-16 @ 04:00)  Hemoglobin: 9.4 g/dL (09-15 @ 01:30)    09-17    131<L>  |  95<L>  |  15  ----------------------------<  117<H>  3.8   |  22  |  0.7    Ca    9.1      17 Sep 2019 03:30  Mg     1.8     09-17    TPro  6.7  /  Alb  4.2  /  TBili  0.9  /  DBili  x   /  AST  26  /  ALT  118<H>  /  AlkPhos  71  09-17    Creatinine, Serum: 0.7 mg/dL (09-17 @ 03:30)  Creatinine, Serum: 0.6 mg/dL (09-16 @ 04:00)  Creatinine, Serum: 0.7 mg/dL (09-15 @ 01:30)  Creatinine, Serum: 0.7 mg/dL (09-14 @ 01:45)          HOSPITAL MEDICATIONS:  MEDICATIONS  (STANDING):  abacavir 600 mG/dolutegravir 50 mG/lamiVUDine 300 mG 1 Tablet(s) Oral daily  aspirin enteric coated 81 milliGRAM(s) Oral daily  clopidogrel Tablet 75 milliGRAM(s) Oral daily  colchicine 0.6 milliGRAM(s) Oral every 12 hours  dextrose 5%. 1000 milliLiter(s) (50 mL/Hr) IV Continuous <Continuous>  dextrose 50% Injectable 50 milliLiter(s) IV Push every 15 minutes  docusate sodium 100 milliGRAM(s) Oral three times a day  furosemide    Tablet 20 milliGRAM(s) Oral daily  guaiFENesin  milliGRAM(s) Oral every 12 hours  heparin  Injectable 5000 Unit(s) SubCutaneous two times a day  insulin lispro (HumaLOG) corrective regimen sliding scale   SubCutaneous three times a day before meals  ipratropium 17 MICROgram(s) HFA Inhaler 2 Puff(s) Inhalation every 6 hours  isosorbide   dinitrate Tablet (ISORDIL) 5 milliGRAM(s) Oral three times a day  magnesium sulfate  IVPB 1 Gram(s) IV Intermittent every 12 hours  metFORMIN 1000 milliGRAM(s) Oral every 12 hours  metoprolol tartrate 12.5 milliGRAM(s) Oral two times a day  pantoprazole    Tablet 40 milliGRAM(s) Oral before breakfast  polyethylene glycol 3350 17 Gram(s) Oral daily  sitaGLIPtin 100 milliGRAM(s) Oral daily  sodium chloride 0.9%. 1000 milliLiter(s) (20 mL/Hr) IV Continuous <Continuous>  spironolactone 25 milliGRAM(s) Oral daily    MEDICATIONS  (PRN):  dextrose 40% Gel 15 Gram(s) Oral once PRN Blood Glucose LESS THAN 70 milliGRAM(s)/deciliter  glucagon  Injectable 1 milliGRAM(s) IntraMuscular once PRN Glucose LESS THAN 70 milligrams/deciliter  oxyCODONE    IR 10 milliGRAM(s) Oral every 4 hours PRN Severe Pain (7 - 10)  oxyCODONE    IR 5 milliGRAM(s) Oral every 4 hours PRN Moderate Pain (4 - 6)  simethicone 80 milliGRAM(s) Chew two times a day PRN Gas      REVIEW OF SYSTEMS:  CONSTITUTIONAL: [X] all negative; [ ] weakness, [ ] fevers, [ ] chills  EYES/ENT: [X] all negative; [ ] visual changes, [ ] vertigo, [ ] throat pain, [ ] eye pain  NECK: [X] all negative; [ ] pain, [ ] stiffness  RESPIRATORY: [ ] all negative, [ ] cough, [ ] wheezing, [ ] hemoptysis, [ ] shortness of breath  CARDIOVASCULAR: [ ] all negative; [ ] chest pain, [ ] palpitations, [ ] orthopnea  GASTROINTESTINAL: [X] all negative; [ ]abdominal pain, [ ] nausea, [ ] vomiting, [ ] hematemesis, [ ] diarrhea, [ ] constipation, [ ] melena, [ ] hematochezia.  GENITOURINARY: [X] all negative; [ ] dysuria, [ ] frequency, [ ] hematuria  NEUROLOGICAL: [X] all negative; [ ] numbness, [ ] weakness, [ ] paresthesias  MUSCULOSKELETAL: [X] all negative, [ ] joint pain, [ ] joint swelling, [ ] joint redness, [ ] bone pain  SKIN: [X] all negative; [ ] itching, [ ] burning, [ ] rashes, [ ] lesions   All other review of systems is negative unless indicated above.    [  ] Unable to assess ROS because     PHYSICAL EXAM:          CONSTITUTIONAL: Well-developed; well-nourished; in no acute distress.   	SKIN: warm, dry, no rashes or lesions  	HEENT: Atraumatic. Normocephalic. PERRL. Moist membranes, no conj injection, sclera clear  	NECK: Supple; non tender.  No JVD. No lymphadenopathy.  	CARD: Normal S1, S2. Rate and Rhythm are regular. No murmurs.  	RESP: Good air entry bilaterally, no wheezes, no rales no rhonchi.  	ABD: Soft, not tender, not distended, no CVA ttp no rebound no guarding, bowel sounds present  	EXT: Normal ROM.  No clubbing, no cyanosis, no pedal edema, no calf pain b/l, Peripheral pulses intact.  	LYMPH: No acute cervical adenopathy.  	NEURO: Alert, awake, motor 5/5 R, 5/5 L, sensation intact bilat, CN 2-12 intact,          PSYCH: Cooperative, appropriate. Alert & oriented x 3    RADIOLOGY:  X Reviewed and interpreted by me  CxR from 09-17-19 shows mild congestion, no pneumothorax, no effusion, no cardiomegaly,   ETT above dayanara in good position, NGT in place, TLC in place, S-G Catheter in place, Chest Tubes in place,     ECG:  X Reviewed and interpreted by me CTU Attending Progress Daily Note     17 Sep 2019 07:56  Admited 09-01-19, Hospital Day 16d  POD# - 7    HPI:  49 y/o M with PMH of HIV (on treatment), DM, DLD, presents to the ED for left tooth pain for 1 week. He says the pain is left sided and radiates up to his head. It is shock-like and pulsatile in nature. Originates at angle of jaw. Made worse by chewing food and palpation of outside of his jaw. He was recently seen by dentist and had a filling placed and was told he would need to have his left wisdom teeth extracted. He has an appointment scheduled with them for Tuesday. He had a fever at home to up to 105 since last night. He took motrin 800mg 10 am and tylenol 500mg 3 pm with minimal relieft of his pain. He went to a walk in clinic where he received amoxicillin and told to come to the ED. He denies changes in vision or hearing, denies neck pain or cough, no SOB or dysphagia/odynophagia. He denies chest pain, SOB, palpitations, abdominal pain, nausea/vomiting/constipation, urinary symptoms or lower extremity swelling. He has HIV with 0 viral load and normal CD4, as per patient.     In ED, STEMI code was called for ST elevation on V1-V3. Stat enzymes were positive. Patient denied chest pain, palpitations, or SOB, and recently had a stress test and echo by Dr Griffin for abnormal EKG findings. He reports workup was negative. Seen by cardiology fellow, Dr Ken and upgrade to CCU approved by Dr Sloan. (02 Sep 2019 01:07)    Home Medications:  Janumet 50 mg-1000 mg oral tablet: 1 tab(s) orally 2 times a day (02 Sep 2019 01:15)  omeprazole 40 mg oral delayed release capsule: 1 cap(s) orally once a day (02 Sep 2019 01:15)  rosuvastatin 20 mg oral tablet: 1 tab(s) orally once a day (02 Sep 2019 01:15)  Triumeq oral tablet: 1 tab(s) orally once a day (02 Sep 2019 01:15)    FAMILY HISTORY:  FH: diabetes mellitus: mother and father  FH: heart failure: mother    PAST MEDICAL & SURGICAL HISTORY:  Diabetes  Hypercholesteremia  HIV (human immunodeficiency virus infection)  History of appendectomy    Interval event for past 24 hr:  BRISEIDA SIMON  48y had no event.   Current Complains:  BRISEIDA SIMON has no new complains  Allergies    No Known Allergies    Intolerances      OBJECTIVE:  Vitals last 24 hrs  T(C): 37 (09-17-19 @ 04:00), Max: 37 (09-17-19 @ 04:00)  T(F): 98.6 (09-17-19 @ 04:00), Max: 98.6 (09-17-19 @ 04:00)  HR: 97 (09-17-19 @ 07:00) (95 - 117)  BP: 103/62 (09-17-19 @ 07:00) (92/61 - 129/88)  ABP: --  ABP(mean): --  RR: 18 (09-17-19 @ 04:00) (18 - 20)  SpO2: 99% (09-17-19 @ 07:00) (96% - 100%)      09-16-19 @ 07:01  -  09-17-19 @ 07:00  --------------------------------------------------------  IN:    IV PiggyBack: 100 mL    Oral Fluid: 240 mL  Total IN: 340 mL    OUT:    Voided: 2800 mL  Total OUT: 2800 mL    Total NET: -2460 mL          CAPILLARY BLOOD GLUCOSE      POCT Blood Glucose.: 110 mg/dL (17 Sep 2019 06:25)  POCT Blood Glucose.: 103 mg/dL (16 Sep 2019 15:55)  POCT Blood Glucose.: 128 mg/dL (16 Sep 2019 11:26)    LABS:                          9.5    7.95  )-----------( 204      ( 17 Sep 2019 03:30 )             28.2     Hemoglobin: 9.5 g/dL (09-17 @ 03:30)  Hemoglobin: 9.6 g/dL (09-16 @ 04:00)  Hemoglobin: 9.4 g/dL (09-15 @ 01:30)    09-17    131<L>  |  95<L>  |  15  ----------------------------<  117<H>  3.8   |  22  |  0.7    Ca    9.1      17 Sep 2019 03:30  Mg     1.8     09-17    TPro  6.7  /  Alb  4.2  /  TBili  0.9  /  DBili  x   /  AST  26  /  ALT  118<H>  /  AlkPhos  71  09-17    Creatinine, Serum: 0.7 mg/dL (09-17 @ 03:30)  Creatinine, Serum: 0.6 mg/dL (09-16 @ 04:00)  Creatinine, Serum: 0.7 mg/dL (09-15 @ 01:30)  Creatinine, Serum: 0.7 mg/dL (09-14 @ 01:45)          HOSPITAL MEDICATIONS:  MEDICATIONS  (STANDING):  abacavir 600 mG/dolutegravir 50 mG/lamiVUDine 300 mG 1 Tablet(s) Oral daily  aspirin enteric coated 81 milliGRAM(s) Oral daily  clopidogrel Tablet 75 milliGRAM(s) Oral daily  colchicine 0.6 milliGRAM(s) Oral every 12 hours  dextrose 5%. 1000 milliLiter(s) (50 mL/Hr) IV Continuous <Continuous>  dextrose 50% Injectable 50 milliLiter(s) IV Push every 15 minutes  docusate sodium 100 milliGRAM(s) Oral three times a day  furosemide    Tablet 20 milliGRAM(s) Oral daily  guaiFENesin  milliGRAM(s) Oral every 12 hours  heparin  Injectable 5000 Unit(s) SubCutaneous two times a day  insulin lispro (HumaLOG) corrective regimen sliding scale   SubCutaneous three times a day before meals  ipratropium 17 MICROgram(s) HFA Inhaler 2 Puff(s) Inhalation every 6 hours  isosorbide   dinitrate Tablet (ISORDIL) 5 milliGRAM(s) Oral three times a day  magnesium sulfate  IVPB 1 Gram(s) IV Intermittent every 12 hours  metFORMIN 1000 milliGRAM(s) Oral every 12 hours  metoprolol tartrate 12.5 milliGRAM(s) Oral two times a day  pantoprazole    Tablet 40 milliGRAM(s) Oral before breakfast  polyethylene glycol 3350 17 Gram(s) Oral daily  sitaGLIPtin 100 milliGRAM(s) Oral daily  sodium chloride 0.9%. 1000 milliLiter(s) (20 mL/Hr) IV Continuous <Continuous>  spironolactone 25 milliGRAM(s) Oral daily    MEDICATIONS  (PRN):  dextrose 40% Gel 15 Gram(s) Oral once PRN Blood Glucose LESS THAN 70 milliGRAM(s)/deciliter  glucagon  Injectable 1 milliGRAM(s) IntraMuscular once PRN Glucose LESS THAN 70 milligrams/deciliter  oxyCODONE    IR 10 milliGRAM(s) Oral every 4 hours PRN Severe Pain (7 - 10)  oxyCODONE    IR 5 milliGRAM(s) Oral every 4 hours PRN Moderate Pain (4 - 6)  simethicone 80 milliGRAM(s) Chew two times a day PRN Gas      REVIEW OF SYSTEMS:  CONSTITUTIONAL: [X] all negative; [ ] weakness, [ ] fevers, [ ] chills  EYES/ENT: [X] all negative; [ ] visual changes, [ ] vertigo, [ ] throat pain, [ ] eye pain  NECK: [X] all negative; [ ] pain, [ ] stiffness  RESPIRATORY: [ ] all negative, [x ] cough, [ ] wheezing, [ ] hemoptysis, [x ] shortness of breath  CARDIOVASCULAR: [ ] all negative; [x ] chest pain, [ ] palpitations, [ ] orthopnea  GASTROINTESTINAL: [X] all negative; [ ]abdominal pain, [ ] nausea, [ ] vomiting, [ ] hematemesis, [ ] diarrhea, [ ] constipation, [ ] melena, [ ] hematochezia.  GENITOURINARY: [X] all negative; [ ] dysuria, [ ] frequency, [ ] hematuria  NEUROLOGICAL: [X] all negative; [ ] numbness, [ ] weakness, [ ] paresthesias  MUSCULOSKELETAL: [X] all negative, [ ] joint pain, [ ] joint swelling, [ ] joint redness, [ ] bone pain  SKIN: [X] all negative; [ ] itching, [ ] burning, [ ] rashes, [ ] lesions   All other review of systems is negative unless indicated above.    [  ] Unable to assess ROS because     PHYSICAL EXAM:          CONSTITUTIONAL: Well-developed; well-nourished; in no acute distress.   	SKIN: warm, dry, no rashes or lesions  	HEENT: Atraumatic. Normocephalic. PERRL. Moist membranes, no conj injection, sclera clear  	NECK: Supple; non tender.  No JVD. No lymphadenopathy.  	CARD: Normal S1, S2. Rate and Rhythm are regular. No murmurs.  	RESP: Good air entry bilaterally, no wheezes, no rales no rhonchi.  	ABD: Soft, not tender, not distended, no CVA ttp no rebound no guarding, bowel sounds present  	EXT: Normal ROM.  No clubbing, no cyanosis, no pedal edema, no calf pain b/l, Peripheral pulses intact.  	LYMPH: No acute cervical adenopathy.  	NEURO: Alert, awake, motor 5/5 R, 5/5 L, sensation intact bilat, CN 2-12 intact,          PSYCH: Cooperative, appropriate. Alert & oriented x 3    RADIOLOGY:  X Reviewed and interpreted by me  CxR from 09-17-19 shows mild congestion, no pneumothorax, no effusion, no cardiomegaly,       ECG:  X Reviewed and interpreted by me - NSR

## 2019-09-18 LAB
CULTURE RESULTS: SIGNIFICANT CHANGE UP
SPECIMEN SOURCE: SIGNIFICANT CHANGE UP

## 2019-09-19 VITALS
WEIGHT: 188 LBS | HEART RATE: 95 BPM | SYSTOLIC BLOOD PRESSURE: 118 MMHG | RESPIRATION RATE: 12 BRPM | OXYGEN SATURATION: 96 % | DIASTOLIC BLOOD PRESSURE: 70 MMHG

## 2019-09-19 DIAGNOSIS — B20 HUMAN IMMUNODEFICIENCY VIRUS [HIV] DISEASE: ICD-10-CM

## 2019-09-19 DIAGNOSIS — K21.9 GASTRO-ESOPHAGEAL REFLUX DISEASE W/OUT ESOPHAGITIS: ICD-10-CM

## 2019-09-19 RX ORDER — POLYETHYLENE GLYCOL 3350, SODIUM SULFATE ANHYDROUS, SODIUM BICARBONATE, SODIUM CHLORIDE, POTASSIUM CHLORIDE 227.1; 21.5; 6.36; 5.53; .754 G/L; G/L; G/L; G/L; G/L
227.1 POWDER, FOR SOLUTION ORAL
Qty: 1 | Refills: 0 | Status: DISCONTINUED | COMMUNITY
Start: 2019-03-29 | End: 2019-09-19

## 2019-09-19 RX ORDER — ROSUVASTATIN CALCIUM 20 MG/1
20 TABLET, FILM COATED ORAL
Refills: 0 | Status: DISCONTINUED | COMMUNITY
End: 2019-09-19

## 2019-09-19 RX ORDER — PANTOPRAZOLE 40 MG/1
40 TABLET, DELAYED RELEASE ORAL
Qty: 1 | Refills: 1 | Status: DISCONTINUED | COMMUNITY
Start: 2019-03-29 | End: 2019-09-19

## 2019-09-19 NOTE — PHYSICAL EXAM
[Rhonchi Left [place]] : rhonchi were heard over the left apex [Decreased Breath Sounds Unilaterally Right Lung Base] : breath sounds were diminished over the right base [Decreased Breath Sounds Unilaterally Left Lung] : breath sounds were dimished over the left lung [Heart Rate And Rhythm] : heart rate was normal and rhythm regular [Heart Sounds] : normal S1 and S2 [Heart Sounds Gallop] : no gallops [Murmurs] : no murmurs [Heart Sounds Pericardial Friction Rub] : no pericardial rub [Examination Of The Chest] : the chest was normal in appearance [Chest Visual Inspection Thoracic Asymmetry] : no chest asymmetry [Diminished Respiratory Excursion] : normal chest expansion [2+] : right 2+ [0] : left 0 [Oriented To Time, Place, And Person] : oriented to person, place, and time [FreeTextEntry1] : L radial harvest, BLLE MIVH sites - C/D/I, BL thigh hematomas noted

## 2019-09-19 NOTE — HISTORY OF PRESENT ILLNESS
[FreeTextEntry1] : 49 y/o male w/ PMHx of DM, HIV, and CAD s/p CABG is seen at home for a post-op f/u.  Pt expresses confusion regarding medication administration.  Pt otherwise appears comfortable and offers no other questions, complaints or concerns.

## 2019-09-19 NOTE — ASSESSMENT
[FreeTextEntry1] : Education\par 1.  DC instructions reviewed with patient - discussed importance of medications (indication, schedule, side effects, and importance of compliance reviewed) and f/u appointments.\par 2.  Clean incision sites daily - shower indirectly, clean with soap and water, pat dry.  Avoid the use of lotions, ointments, powders, and perfumes on or around incision sites.\par 3.  Sternal precautions - avoid any heavy lifting (>5-10 lbs x 8 weeks), raising both arms above head simultaneously.\par 4.  Place TEDs on in AM prior to getting out of bed and off in PM when returning to bed.\par 5.  Follow a heart healthy diet - low salt, low fat.\par 6.  Exercise daily.\par 7.  Monitor and call [facility] NP for signs and symptoms of infection (redness, drainage, and fever).\par 8.  Monitor daily weights (call for a gain of 2-3 lbs/day or 5-6 lbs/week). \par 9.  Blood sugar control necessary for wound healing if applicable.\par 10.  Avoid straining during BM - use stool softners as needed. \par 11.  Instructed on importance of incentive spirometry use (10x/hour).\par \par Patient verbalized understanding of all education outlined above. Pt instructed to call [facility] NP for any issues as delineated above.\par \par PLAN\par 1.  Monitor daily weights\par 2.  Continue current medications as prescribed\par 3.  Incentive spirometry use\par 4.  Maintain sternal precautions\par 5.  Exercise daily\par 5.  Maintain HH diet\par 6.  Maintain TEDs\par 7.  Keep legs elevated\par 8.  F/U appointments - \par [facility] 9/20\par Cardio Sicat in 2 weeks v. new cardiologist TBD\par 9.  [facility] NP will continue to f/u with patient status - Pt agrees to call with any questions/concerns\par 10. To recover without complications.\par 11.  Apply warm compress to BL thigh hemtaomas.\par 12.  CMP to be drawn prior to f/u on 9/20

## 2019-09-20 ENCOUNTER — APPOINTMENT (OUTPATIENT)
Age: 49
End: 2019-09-20
Payer: COMMERCIAL

## 2019-09-20 ENCOUNTER — OUTPATIENT (OUTPATIENT)
Dept: OUTPATIENT SERVICES | Facility: HOSPITAL | Age: 49
LOS: 1 days | Discharge: HOME | End: 2019-09-20

## 2019-09-20 VITALS
TEMPERATURE: 97.8 F | RESPIRATION RATE: 112 BRPM | DIASTOLIC BLOOD PRESSURE: 73 MMHG | OXYGEN SATURATION: 97 % | HEART RATE: 89 BPM | WEIGHT: 185 LBS | SYSTOLIC BLOOD PRESSURE: 109 MMHG

## 2019-09-20 DIAGNOSIS — Z90.49 ACQUIRED ABSENCE OF OTHER SPECIFIED PARTS OF DIGESTIVE TRACT: Chronic | ICD-10-CM

## 2019-09-20 DIAGNOSIS — Z00.00 ENCOUNTER FOR GENERAL ADULT MEDICAL EXAMINATION WITHOUT ABNORMAL FINDINGS: ICD-10-CM

## 2019-09-20 PROCEDURE — 99024 POSTOP FOLLOW-UP VISIT: CPT

## 2019-09-20 RX ORDER — DOCUSATE SODIUM 100 MG
100 CAPSULE ORAL
Refills: 0 | Status: DISCONTINUED | COMMUNITY
Start: 2019-09-19 | End: 2019-09-20

## 2019-09-22 RX ORDER — POTASSIUM CHLORIDE 20 MEQ
1 PACKET (EA) ORAL
Qty: 5 | Refills: 0
Start: 2019-09-22 | End: 2019-09-26

## 2019-09-22 RX ORDER — FUROSEMIDE 40 MG
1 TABLET ORAL
Qty: 5 | Refills: 0
Start: 2019-09-22 | End: 2019-09-26

## 2019-09-23 DIAGNOSIS — I31.3 PERICARDIAL EFFUSION (NONINFLAMMATORY): ICD-10-CM

## 2019-09-23 DIAGNOSIS — I21.4 NON-ST ELEVATION (NSTEMI) MYOCARDIAL INFARCTION: ICD-10-CM

## 2019-09-23 DIAGNOSIS — D69.6 THROMBOCYTOPENIA, UNSPECIFIED: ICD-10-CM

## 2019-09-23 DIAGNOSIS — E78.5 HYPERLIPIDEMIA, UNSPECIFIED: ICD-10-CM

## 2019-09-23 DIAGNOSIS — Z21 ASYMPTOMATIC HUMAN IMMUNODEFICIENCY VIRUS [HIV] INFECTION STATUS: ICD-10-CM

## 2019-09-23 DIAGNOSIS — E87.2 ACIDOSIS: ICD-10-CM

## 2019-09-23 DIAGNOSIS — F17.200 NICOTINE DEPENDENCE, UNSPECIFIED, UNCOMPLICATED: ICD-10-CM

## 2019-09-23 DIAGNOSIS — E11.9 TYPE 2 DIABETES MELLITUS WITHOUT COMPLICATIONS: ICD-10-CM

## 2019-09-23 DIAGNOSIS — I50.23 ACUTE ON CHRONIC SYSTOLIC (CONGESTIVE) HEART FAILURE: ICD-10-CM

## 2019-09-23 DIAGNOSIS — K21.9 GASTRO-ESOPHAGEAL REFLUX DISEASE WITHOUT ESOPHAGITIS: ICD-10-CM

## 2019-09-23 DIAGNOSIS — R00.0 TACHYCARDIA, UNSPECIFIED: ICD-10-CM

## 2019-09-23 DIAGNOSIS — I42.9 CARDIOMYOPATHY, UNSPECIFIED: ICD-10-CM

## 2019-09-23 DIAGNOSIS — K05.30 CHRONIC PERIODONTITIS, UNSPECIFIED: ICD-10-CM

## 2019-09-23 DIAGNOSIS — Z83.3 FAMILY HISTORY OF DIABETES MELLITUS: ICD-10-CM

## 2019-09-23 DIAGNOSIS — Z82.49 FAMILY HISTORY OF ISCHEMIC HEART DISEASE AND OTHER DISEASES OF THE CIRCULATORY SYSTEM: ICD-10-CM

## 2019-09-23 DIAGNOSIS — D64.9 ANEMIA, UNSPECIFIED: ICD-10-CM

## 2019-09-23 DIAGNOSIS — I24.9 ACUTE ISCHEMIC HEART DISEASE, UNSPECIFIED: ICD-10-CM

## 2019-09-23 DIAGNOSIS — K11.20 SIALOADENITIS, UNSPECIFIED: ICD-10-CM

## 2019-09-23 DIAGNOSIS — I25.10 ATHEROSCLEROTIC HEART DISEASE OF NATIVE CORONARY ARTERY WITHOUT ANGINA PECTORIS: ICD-10-CM

## 2019-09-23 DIAGNOSIS — K08.89 OTHER SPECIFIED DISORDERS OF TEETH AND SUPPORTING STRUCTURES: ICD-10-CM

## 2019-09-25 VITALS
RESPIRATION RATE: 12 BRPM | SYSTOLIC BLOOD PRESSURE: 100 MMHG | DIASTOLIC BLOOD PRESSURE: 60 MMHG | HEART RATE: 92 BPM | OXYGEN SATURATION: 96 %

## 2019-09-25 RX ORDER — POTASSIUM CHLORIDE 1500 MG/1
20 TABLET, EXTENDED RELEASE ORAL
Refills: 0 | Status: COMPLETED | COMMUNITY
Start: 2019-09-25 | End: 2019-09-25

## 2019-09-25 RX ORDER — FUROSEMIDE 40 MG/1
40 TABLET ORAL
Refills: 0 | Status: COMPLETED | COMMUNITY
Start: 2019-09-25 | End: 2019-09-25

## 2019-09-25 NOTE — HISTORY OF PRESENT ILLNESS
[FreeTextEntry1] : FOLLOW YOUR HEART\par \par 47 y/o male w/ recent c/o JIMÉNEZ accompanied by increased LE swelling and decreased breath sounds as reported by visiting RN was placed on lasix 40 mg QD by [facility].  Pt instructed by [facility] NP to stop lasix after 3 doses.  Pt seen for evaluation of further need for diuretic therapy.

## 2019-09-25 NOTE — PHYSICAL EXAM
[Decreased Breath Sounds Unilaterally Left Lung Base] : breath sounds were diminished over the left base [Heart Rate And Rhythm] : heart rate was normal and rhythm regular [Heart Sounds] : normal S1 and S2 [Heart Sounds Gallop] : no gallops [Heart Sounds Pericardial Friction Rub] : no pericardial rub [Murmurs] : no murmurs [FreeTextEntry1] : No edema present

## 2019-09-25 NOTE — ASSESSMENT
[FreeTextEntry1] : Pt takes metoprolol 25mg TID - pt at risk for hypotensive epsiode w/ diuretic therapy.  Resolved LE edema w/ improved breath sounds.  Pt denies SOB or JIMÉNEZ.  Pt instructed to stop taking lasix/K and to call w/ any new or worsening symptoms.

## 2019-09-26 ENCOUNTER — OUTPATIENT (OUTPATIENT)
Dept: OUTPATIENT SERVICES | Facility: HOSPITAL | Age: 49
LOS: 1 days | Discharge: HOME | End: 2019-09-26

## 2019-09-26 DIAGNOSIS — B20 HUMAN IMMUNODEFICIENCY VIRUS [HIV] DISEASE: ICD-10-CM

## 2019-09-26 DIAGNOSIS — Z90.49 ACQUIRED ABSENCE OF OTHER SPECIFIED PARTS OF DIGESTIVE TRACT: Chronic | ICD-10-CM

## 2019-10-04 ENCOUNTER — APPOINTMENT (OUTPATIENT)
Dept: CARDIOTHORACIC SURGERY | Facility: CLINIC | Age: 49
End: 2019-10-04
Payer: COMMERCIAL

## 2019-10-04 VITALS
TEMPERATURE: 98.4 F | HEIGHT: 68 IN | DIASTOLIC BLOOD PRESSURE: 66 MMHG | SYSTOLIC BLOOD PRESSURE: 102 MMHG | HEART RATE: 85 BPM | WEIGHT: 180 LBS | RESPIRATION RATE: 13 BRPM | BODY MASS INDEX: 27.28 KG/M2 | OXYGEN SATURATION: 98 %

## 2019-10-04 PROCEDURE — 99024 POSTOP FOLLOW-UP VISIT: CPT

## 2019-10-04 RX ORDER — COLCHICINE 0.6 MG/1
0.6 TABLET ORAL
Refills: 0 | Status: DISCONTINUED | COMMUNITY
Start: 2019-09-19 | End: 2019-10-04

## 2019-10-04 RX ORDER — CEPHALEXIN 500 MG/1
500 CAPSULE ORAL
Refills: 0 | Status: DISCONTINUED | COMMUNITY
Start: 2019-09-19 | End: 2019-10-04

## 2019-10-04 RX ORDER — OXYCODONE AND ACETAMINOPHEN 5; 325 MG/1; MG/1
5-325 TABLET ORAL
Refills: 0 | Status: DISCONTINUED | COMMUNITY
Start: 2019-09-19 | End: 2019-10-04

## 2019-10-07 ENCOUNTER — APPOINTMENT (OUTPATIENT)
Dept: CARDIOLOGY | Facility: CLINIC | Age: 49
End: 2019-10-07
Payer: COMMERCIAL

## 2019-10-07 VITALS
HEIGHT: 68 IN | BODY MASS INDEX: 28.19 KG/M2 | SYSTOLIC BLOOD PRESSURE: 98 MMHG | DIASTOLIC BLOOD PRESSURE: 62 MMHG | HEART RATE: 72 BPM | WEIGHT: 186 LBS

## 2019-10-07 PROCEDURE — 93000 ELECTROCARDIOGRAM COMPLETE: CPT

## 2019-10-07 PROCEDURE — 99204 OFFICE O/P NEW MOD 45 MIN: CPT

## 2019-10-07 RX ORDER — METOPROLOL TARTRATE 25 MG/1
25 TABLET, FILM COATED ORAL EVERY 8 HOURS
Refills: 0 | Status: COMPLETED | COMMUNITY
Start: 2019-09-19 | End: 2019-10-07

## 2019-10-07 NOTE — DISCUSSION/SUMMARY
[FreeTextEntry1] : C/w ASA, Palvix\par Restart Crestor at 10 mg. Check Lipid panel and LFT's in 6 weeks Last ALT 51, AST - nl.\par Obtian 2D echo in 2-3 months to re-assess LV function.\par C/w BB - change to succinate\par Low BP - unable to start Entresto or ARB.\par Will consider again next visit, if BP improved.\par GI follow-up. Consider PPI (Protonix) for David's\par F/u in 6-8 weeks.

## 2019-10-07 NOTE — PHYSICAL EXAM
[General Appearance - Well Developed] : well developed [Normal Appearance] : normal appearance [General Appearance - Well Nourished] : well nourished [Well Groomed] : well groomed [No Deformities] : no deformities [General Appearance - In No Acute Distress] : no acute distress [Normal Conjunctiva] : the conjunctiva exhibited no abnormalities [Normal Oral Mucosa] : normal oral mucosa [No Oral Pallor] : no oral pallor [Heart Rate And Rhythm] : heart rate and rhythm were normal [Heart Sounds] : normal S1 and S2 [Murmurs] : no murmurs present [Edema] : no peripheral edema present [FreeTextEntry1] : sternotomy scar [] : no respiratory distress [Respiration, Rhythm And Depth] : normal respiratory rhythm and effort [Auscultation Breath Sounds / Voice Sounds] : lungs were clear to auscultation bilaterally [Bowel Sounds] : normal bowel sounds [Abdomen Soft] : soft [Abdomen Tenderness] : non-tender [Abnormal Walk] : normal gait [Nail Clubbing] : no clubbing of the fingernails [Cyanosis, Localized] : no localized cyanosis [Petechial Hemorrhages (___cm)] : no petechial hemorrhages [Skin Color & Pigmentation] : normal skin color and pigmentation [Oriented To Time, Place, And Person] : oriented to person, place, and time [No Venous Stasis] : no venous stasis [Affect] : the affect was normal

## 2019-10-07 NOTE — ASSESSMENT
[FreeTextEntry1] : CAD, s/p CABG for multivessel disease\par DM\par DL\par Borderline BP.\par LFT's reviewed\par Cath report, CABG report, echo, hospital notes reviewed.

## 2019-10-07 NOTE — REASON FOR VISIT
[Initial Evaluation] : an initial evaluation of [Coronary Artery Disease] : coronary artery disease [CABG Follow-up] : bypass graft

## 2019-10-07 NOTE — HISTORY OF PRESENT ILLNESS
[FreeTextEntry1] : 47 yo M with HIV on ART, DM (HgA1C 6.3), DLD, recent admission to [facility] - ECG was abnormal, cardiac Cath c/w with multivessel disease, reduced EF. Patient underwent CABG x 4 (LIMA- LAD, SVG- OM1, SVG- OM2, radial- PDA) on 9/10/19. Patient tolerated procedure well, and had no issues coming off bypass.  Post op Ef improved to 35% from 25% preop. \par Post-operatively, patient required diuresis for fluid overload. Patient was started on metoprolol for heart rate control, however due to borderline bp he was not able to tolerated isordil or ACE-I as well. Patient was not started on statin due to elevated lfts. Previously followed with Dr. Griffin, but would like to switch. No CP, dyspnea, orthopnea or PND, Edema resolved.

## 2019-10-18 ENCOUNTER — APPOINTMENT (OUTPATIENT)
Dept: CARDIOTHORACIC SURGERY | Facility: CLINIC | Age: 49
End: 2019-10-18
Payer: COMMERCIAL

## 2019-10-18 VITALS
HEART RATE: 82 BPM | TEMPERATURE: 98.4 F | RESPIRATION RATE: 12 BRPM | BODY MASS INDEX: 27.43 KG/M2 | WEIGHT: 181 LBS | OXYGEN SATURATION: 99 % | HEIGHT: 68 IN | DIASTOLIC BLOOD PRESSURE: 85 MMHG | SYSTOLIC BLOOD PRESSURE: 125 MMHG

## 2019-10-18 PROCEDURE — 99024 POSTOP FOLLOW-UP VISIT: CPT

## 2019-11-14 ENCOUNTER — OUTPATIENT (OUTPATIENT)
Dept: OUTPATIENT SERVICES | Facility: HOSPITAL | Age: 49
LOS: 1 days | Discharge: HOME | End: 2019-11-14

## 2019-11-14 DIAGNOSIS — E78.00 PURE HYPERCHOLESTEROLEMIA, UNSPECIFIED: ICD-10-CM

## 2019-11-14 DIAGNOSIS — Z90.49 ACQUIRED ABSENCE OF OTHER SPECIFIED PARTS OF DIGESTIVE TRACT: Chronic | ICD-10-CM

## 2019-11-14 DIAGNOSIS — I25.10 ATHEROSCLEROTIC HEART DISEASE OF NATIVE CORONARY ARTERY WITHOUT ANGINA PECTORIS: ICD-10-CM

## 2019-11-15 LAB
ALBUMIN SERPL ELPH-MCNC: 4.7 G/DL
ALP BLD-CCNC: 66 U/L
ALT SERPL-CCNC: 24 U/L
ANION GAP SERPL CALC-SCNC: 14 MMOL/L
AST SERPL-CCNC: 21 U/L
BILIRUB SERPL-MCNC: 0.4 MG/DL
BUN SERPL-MCNC: 11 MG/DL
CALCIUM SERPL-MCNC: 9.5 MG/DL
CHLORIDE SERPL-SCNC: 100 MMOL/L
CHOLEST SERPL-MCNC: 145 MG/DL
CHOLEST/HDLC SERPL: 3.7 RATIO
CO2 SERPL-SCNC: 25 MMOL/L
CREAT SERPL-MCNC: 0.9 MG/DL
GLUCOSE SERPL-MCNC: 123 MG/DL
HDLC SERPL-MCNC: 39 MG/DL
LDLC SERPL CALC-MCNC: 87 MG/DL
POTASSIUM SERPL-SCNC: 4.4 MMOL/L
PROT SERPL-MCNC: 7.5 G/DL
SODIUM SERPL-SCNC: 139 MMOL/L
TRIGL SERPL-MCNC: 193 MG/DL

## 2019-11-18 ENCOUNTER — APPOINTMENT (OUTPATIENT)
Dept: CARDIOLOGY | Facility: CLINIC | Age: 49
End: 2019-11-18
Payer: COMMERCIAL

## 2019-11-18 VITALS
HEIGHT: 68 IN | SYSTOLIC BLOOD PRESSURE: 110 MMHG | BODY MASS INDEX: 29.25 KG/M2 | HEART RATE: 77 BPM | WEIGHT: 193 LBS | DIASTOLIC BLOOD PRESSURE: 70 MMHG

## 2019-11-18 PROCEDURE — 99214 OFFICE O/P EST MOD 30 MIN: CPT

## 2019-11-18 PROCEDURE — 93000 ELECTROCARDIOGRAM COMPLETE: CPT

## 2019-11-18 NOTE — DISCUSSION/SUMMARY
[FreeTextEntry1] : C/w ASA, Palvix\par Started rehab.\par Restart Crestor at 10 mg. LDL - \par Obtian 2D echo  to re-assess LV function.\par C/w BB - change to succinate\par Low BP - unable to start Entresto or ARB. Will monitor. If EF is low, will consider again\par Will consider again next visit, if BP improved.\par GI follow-up. Consider PPI (Protonix) for David's\par F/u in 6-8 weeks.

## 2019-11-18 NOTE — HISTORY OF PRESENT ILLNESS
[FreeTextEntry1] : 47 yo M with HIV on ART, DM (HgA1C 6.3), DLD, recent admission to [facility] - ECG was abnormal, cardiac Cath c/w with multivessel disease, reduced EF. Patient underwent CABG x 4 (LIMA- LAD, SVG- OM1, SVG- OM2, radial- PDA) on 9/10/19. Patient tolerated procedure well, and had no issues coming off bypass.  Post op EF improved to 35% from 25% preop. \par Post-operatively, patient required diuresis for fluid overload. Patient was started on metoprolol for heart rate control, however due to borderline bp he was not able to tolerated isordil or ACE-I as well. Patient was not started on statin due to elevated lfts. Previously followed with Dr. Griffin, but would like to switch. No CP, dyspnea, orthopnea or PND, Edema resolved.

## 2019-11-18 NOTE — PHYSICAL EXAM
[General Appearance - Well Developed] : well developed [Normal Appearance] : normal appearance [Well Groomed] : well groomed [General Appearance - Well Nourished] : well nourished [No Deformities] : no deformities [General Appearance - In No Acute Distress] : no acute distress [Normal Conjunctiva] : the conjunctiva exhibited no abnormalities [Normal Oral Mucosa] : normal oral mucosa [No Oral Pallor] : no oral pallor [Heart Rate And Rhythm] : heart rate and rhythm were normal [Heart Sounds] : normal S1 and S2 [Murmurs] : no murmurs present [Edema] : no peripheral edema present [FreeTextEntry1] : sternotomy scar [] : no respiratory distress [Respiration, Rhythm And Depth] : normal respiratory rhythm and effort [Auscultation Breath Sounds / Voice Sounds] : lungs were clear to auscultation bilaterally [Bowel Sounds] : normal bowel sounds [Abdomen Soft] : soft [Abdomen Tenderness] : non-tender [Nail Clubbing] : no clubbing of the fingernails [Abnormal Walk] : normal gait [Cyanosis, Localized] : no localized cyanosis [Petechial Hemorrhages (___cm)] : no petechial hemorrhages [Skin Color & Pigmentation] : normal skin color and pigmentation [No Venous Stasis] : no venous stasis [Oriented To Time, Place, And Person] : oriented to person, place, and time [Affect] : the affect was normal

## 2019-11-19 ENCOUNTER — OTHER (OUTPATIENT)
Age: 49
End: 2019-11-19

## 2019-11-20 LAB — GLUCOSE BLDC GLUCOMTR-MCNC: 88 MG/DL — SIGNIFICANT CHANGE UP (ref 70–99)

## 2019-11-22 LAB — GLUCOSE BLDC GLUCOMTR-MCNC: 82 MG/DL — SIGNIFICANT CHANGE UP (ref 70–99)

## 2019-11-25 LAB — GLUCOSE BLDC GLUCOMTR-MCNC: 84 MG/DL — SIGNIFICANT CHANGE UP (ref 70–99)

## 2019-11-27 LAB — GLUCOSE BLDC GLUCOMTR-MCNC: 107 MG/DL — HIGH (ref 70–99)

## 2019-11-29 LAB — GLUCOSE BLDC GLUCOMTR-MCNC: 92 MG/DL — SIGNIFICANT CHANGE UP (ref 70–99)

## 2019-12-04 LAB — GLUCOSE BLDC GLUCOMTR-MCNC: 134 MG/DL — HIGH (ref 70–99)

## 2019-12-06 LAB — GLUCOSE BLDC GLUCOMTR-MCNC: 81 MG/DL — SIGNIFICANT CHANGE UP (ref 70–99)

## 2019-12-12 ENCOUNTER — APPOINTMENT (OUTPATIENT)
Dept: CARDIOLOGY | Facility: CLINIC | Age: 49
End: 2019-12-12
Payer: COMMERCIAL

## 2019-12-12 PROCEDURE — 93306 TTE W/DOPPLER COMPLETE: CPT

## 2019-12-19 ENCOUNTER — INPATIENT (INPATIENT)
Facility: HOSPITAL | Age: 49
LOS: 4 days | Discharge: HOME | End: 2019-12-24
Attending: SPECIALIST | Admitting: SPECIALIST
Payer: COMMERCIAL

## 2019-12-19 ENCOUNTER — OUTPATIENT (OUTPATIENT)
Dept: OUTPATIENT SERVICES | Facility: HOSPITAL | Age: 49
LOS: 1 days | Discharge: HOME | End: 2019-12-19

## 2019-12-19 VITALS
HEART RATE: 83 BPM | TEMPERATURE: 98 F | OXYGEN SATURATION: 97 % | DIASTOLIC BLOOD PRESSURE: 59 MMHG | SYSTOLIC BLOOD PRESSURE: 118 MMHG | RESPIRATION RATE: 18 BRPM

## 2019-12-19 VITALS
DIASTOLIC BLOOD PRESSURE: 66 MMHG | RESPIRATION RATE: 18 BRPM | SYSTOLIC BLOOD PRESSURE: 105 MMHG | OXYGEN SATURATION: 98 % | HEART RATE: 88 BPM | TEMPERATURE: 97 F

## 2019-12-19 DIAGNOSIS — Z90.49 ACQUIRED ABSENCE OF OTHER SPECIFIED PARTS OF DIGESTIVE TRACT: Chronic | ICD-10-CM

## 2019-12-19 LAB
ALBUMIN SERPL ELPH-MCNC: 4.5 G/DL — SIGNIFICANT CHANGE UP (ref 3.5–5.2)
ALP SERPL-CCNC: 61 U/L — SIGNIFICANT CHANGE UP (ref 30–115)
ALT FLD-CCNC: 20 U/L — SIGNIFICANT CHANGE UP (ref 0–41)
ANION GAP SERPL CALC-SCNC: 13 MMOL/L — SIGNIFICANT CHANGE UP (ref 7–14)
AST SERPL-CCNC: 20 U/L — SIGNIFICANT CHANGE UP (ref 0–41)
BASE EXCESS BLDV CALC-SCNC: 1.4 MMOL/L — SIGNIFICANT CHANGE UP (ref -2–2)
BASOPHILS # BLD AUTO: 0.03 K/UL — SIGNIFICANT CHANGE UP (ref 0–0.2)
BASOPHILS NFR BLD AUTO: 0.5 % — SIGNIFICANT CHANGE UP (ref 0–1)
BILIRUB SERPL-MCNC: 0.5 MG/DL — SIGNIFICANT CHANGE UP (ref 0.2–1.2)
BUN SERPL-MCNC: 14 MG/DL — SIGNIFICANT CHANGE UP (ref 10–20)
CA-I SERPL-SCNC: 1.21 MMOL/L — SIGNIFICANT CHANGE UP (ref 1.12–1.3)
CALCIUM SERPL-MCNC: 9.2 MG/DL — SIGNIFICANT CHANGE UP (ref 8.5–10.1)
CHLORIDE SERPL-SCNC: 101 MMOL/L — SIGNIFICANT CHANGE UP (ref 98–110)
CO2 SERPL-SCNC: 24 MMOL/L — SIGNIFICANT CHANGE UP (ref 17–32)
CREAT SERPL-MCNC: 0.9 MG/DL — SIGNIFICANT CHANGE UP (ref 0.7–1.5)
EOSINOPHIL # BLD AUTO: 0.12 K/UL — SIGNIFICANT CHANGE UP (ref 0–0.7)
EOSINOPHIL NFR BLD AUTO: 2.1 % — SIGNIFICANT CHANGE UP (ref 0–8)
GAS PNL BLDV: 138 MMOL/L — SIGNIFICANT CHANGE UP (ref 136–145)
GAS PNL BLDV: SIGNIFICANT CHANGE UP
GLUCOSE SERPL-MCNC: 114 MG/DL — HIGH (ref 70–99)
HCO3 BLDV-SCNC: 28 MMOL/L — SIGNIFICANT CHANGE UP (ref 22–29)
HCT VFR BLD CALC: 43.3 % — SIGNIFICANT CHANGE UP (ref 42–52)
HCT VFR BLDA CALC: 45.4 % — HIGH (ref 34–44)
HGB BLD CALC-MCNC: 14.8 G/DL — SIGNIFICANT CHANGE UP (ref 14–18)
HGB BLD-MCNC: 13.8 G/DL — LOW (ref 14–18)
IMM GRANULOCYTES NFR BLD AUTO: 0.7 % — HIGH (ref 0.1–0.3)
INR BLD: 1.1 RATIO — SIGNIFICANT CHANGE UP (ref 0.65–1.3)
LACTATE BLDV-MCNC: 1.3 MMOL/L — SIGNIFICANT CHANGE UP (ref 0.5–1.6)
LYMPHOCYTES # BLD AUTO: 1.76 K/UL — SIGNIFICANT CHANGE UP (ref 1.2–3.4)
LYMPHOCYTES # BLD AUTO: 30.2 % — SIGNIFICANT CHANGE UP (ref 20.5–51.1)
MCHC RBC-ENTMCNC: 26.9 PG — LOW (ref 27–31)
MCHC RBC-ENTMCNC: 31.9 G/DL — LOW (ref 32–37)
MCV RBC AUTO: 84.4 FL — SIGNIFICANT CHANGE UP (ref 80–94)
MONOCYTES # BLD AUTO: 0.45 K/UL — SIGNIFICANT CHANGE UP (ref 0.1–0.6)
MONOCYTES NFR BLD AUTO: 7.7 % — SIGNIFICANT CHANGE UP (ref 1.7–9.3)
NEUTROPHILS # BLD AUTO: 3.42 K/UL — SIGNIFICANT CHANGE UP (ref 1.4–6.5)
NEUTROPHILS NFR BLD AUTO: 58.8 % — SIGNIFICANT CHANGE UP (ref 42.2–75.2)
NRBC # BLD: 0 /100 WBCS — SIGNIFICANT CHANGE UP (ref 0–0)
NT-PROBNP SERPL-SCNC: 215 PG/ML — SIGNIFICANT CHANGE UP (ref 0–300)
PCO2 BLDV: 53 MMHG — HIGH (ref 41–51)
PH BLDV: 7.34 — SIGNIFICANT CHANGE UP (ref 7.26–7.43)
PLATELET # BLD AUTO: 114 K/UL — LOW (ref 130–400)
PO2 BLDV: 17 MMHG — LOW (ref 20–40)
POTASSIUM BLDV-SCNC: 4.5 MMOL/L — SIGNIFICANT CHANGE UP (ref 3.3–5.6)
POTASSIUM SERPL-MCNC: 5.1 MMOL/L — HIGH (ref 3.5–5)
POTASSIUM SERPL-SCNC: 5.1 MMOL/L — HIGH (ref 3.5–5)
PROT SERPL-MCNC: 7.3 G/DL — SIGNIFICANT CHANGE UP (ref 6–8)
PROTHROM AB SERPL-ACNC: 12.6 SEC — SIGNIFICANT CHANGE UP (ref 9.95–12.87)
RBC # BLD: 5.13 M/UL — SIGNIFICANT CHANGE UP (ref 4.7–6.1)
RBC # FLD: 14.1 % — SIGNIFICANT CHANGE UP (ref 11.5–14.5)
SAO2 % BLDV: 20 % — SIGNIFICANT CHANGE UP
SODIUM SERPL-SCNC: 138 MMOL/L — SIGNIFICANT CHANGE UP (ref 135–146)
TROPONIN T SERPL-MCNC: <0.01 NG/ML — SIGNIFICANT CHANGE UP
WBC # BLD: 5.82 K/UL — SIGNIFICANT CHANGE UP (ref 4.8–10.8)
WBC # FLD AUTO: 5.82 K/UL — SIGNIFICANT CHANGE UP (ref 4.8–10.8)

## 2019-12-19 PROCEDURE — 93010 ELECTROCARDIOGRAM REPORT: CPT

## 2019-12-19 PROCEDURE — 99285 EMERGENCY DEPT VISIT HI MDM: CPT

## 2019-12-19 PROCEDURE — 71046 X-RAY EXAM CHEST 2 VIEWS: CPT | Mod: 26

## 2019-12-19 NOTE — ED PROVIDER NOTE - OBJECTIVE STATEMENT
50 yo male with a pmh of HTN, HLD, HIV, and quad bypass s/p 3 months presents with chest pain and SOB for one week. chest pain is to the sternal region with no radiation and described as dull in nature. no noted aggravating of alleviating factors. SOB is worsened with exertion and laying flat. denies any other symptoms including fevers, chill, headache, recent illness/travel, cough, abdominal pain, or n/v/d.

## 2019-12-19 NOTE — CONSULT NOTE ADULT - SUBJECTIVE AND OBJECTIVE BOX
BRISEIDA SIMON  49y, Male  Allergy: No Known Allergies      CHIEF COMPLAINT: JIMÉNEZ    HPI:  47 yo M with HIV on ART, DM, DLD, s/p CABG 9/2019, post-op course with fever, transaminitis, pericardial effusion now sent in from routine PCP appt with a few days of JIMÉNEZ and intermittent chest tightness. Reports rhinorrhea, no sore throat, dry cough. No fever or chills. Reports children at home have URIs. Reports JIMÉNEZ, especially on stairs. A week or so ago had CP during Cardiac rehab. Currently no CP.     PAST MEDICAL & SURGICAL HISTORY:  Diabetes  Hypercholesteremia  HIV (human immunodeficiency virus infection)  History of appendectomy      FAMILY HISTORY  FH: diabetes mellitus  FH: heart failure      SOCIAL HISTORY    Pt denies any current heavy ETOH use, IVDU. No recent travel outside the [place].       ROS  General: Denies rigors, nightsweats  HEENT: Denies headache, eye pain  CV: as noted above   PULM: as noted above   GI: Denies hematemesis, hematochezia, melena  : Denies discharge, hematuria  MSK: Denies arthralgias, myalgias  SKIN: Denies rash, lesions  NEURO: Denies paresthesias, weakness  PSYCH: Denies depression, anxiety    VITALS:  T(F): 97, Max: 97.6 (12-19-19 @ 11:19)  HR: 88  BP: 105/66  RR: 18Vital Signs Last 24 Hrs  T(C): 36.1 (19 Dec 2019 15:29), Max: 36.4 (19 Dec 2019 11:19)  T(F): 97 (19 Dec 2019 15:29), Max: 97.6 (19 Dec 2019 11:19)  HR: 88 (19 Dec 2019 15:29) (83 - 88)  BP: 105/66 (19 Dec 2019 15:29) (105/66 - 118/59)  BP(mean): --  RR: 18 (19 Dec 2019 15:29) (18 - 18)  SpO2: 98% (19 Dec 2019 15:29) (97% - 98%)    PHYSICAL EXAM:  Gen: NAD, resting in bed  HEENT: Normocephalic, atraumatic  Neck: supple, no lymphadenopathy  CV: Regular rate & regular rhythm  Lungs: decreased BS L base  Abdomen: Soft, BS present  Ext: Warm, well perfused  Neuro: non focal, awake  Skin: no rash, no erythema  Lines: no phlebitis    TESTS & MEASUREMENTS:                        13.8   5.82  )-----------( 114      ( 19 Dec 2019 11:20 )             43.3     12-19    138  |  101  |  14  ----------------------------<  114<H>  5.1<H>   |  24  |  0.9    Ca    9.2      19 Dec 2019 11:20    TPro  7.3  /  Alb  4.5  /  TBili  0.5  /  DBili  x   /  AST  20  /  ALT  20  /  AlkPhos  61  12-19    eGFR if Non African American: 101 mL/min/1.73M2 (12-19-19 @ 11:20)  eGFR if African American: 117 mL/min/1.73M2 (12-19-19 @ 11:20)    LIVER FUNCTIONS - ( 19 Dec 2019 11:20 )  Alb: 4.5 g/dL / Pro: 7.3 g/dL / ALK PHOS: 61 U/L / ALT: 20 U/L / AST: 20 U/L / GGT: x                 Blood Gas Venous - Lactate: 1.3 mmoL/L (12-19-19 @ 12:26)      INFECTIOUS DISEASES TESTING  MRSA PCR Result.: Negative (09-08-19 @ 17:59)  HIV-1/2 Combo Result: Reactive (09-02-19 @ 05:44)      RADIOLOGY & ADDITIONAL TESTS:  I have personally reviewed the last Chest xray  CXR  Xray Chest 2 Views PA/Lat:   EXAM:  XR CHEST PA LAT 2V            PROCEDURE DATE:  12/19/2019            INTERPRETATION:  Clinical History / Reason for exam: Chest pain    Comparison : Chest radiograph 9/17/2019.    Technique/Positioning: PA and lateral views.    Findings:    Support devices: None.    Cardiac/mediastinum/hilum: Median sternotomy.    Lung parenchyma/Pleura: Blunted left costophrenic angle. No pneumothorax.    Skeleton/soft tissues: Stable.    Impression:      Small left pleural effusion.                      SHAWN CABRERA M.D., ATTENDING RADIOLOGIST  This document has been electronically signed. Dec 19 2019  1:57PM             (12-19-19 @ 13:52)      CT      CARDIOLOGY TESTING  12 Lead ECG:   Ventricular Rate 84 BPM    Atrial Rate 84 BPM    P-R Interval 178 ms    QRS Duration 102 ms    Q-T Interval 328 ms    QTC Calculation(Bezet) 387 ms    P Axis 41 degrees    R Axis 99 degrees    T Axis 60 degrees    Diagnosis Line Sinus rhythm with occasional Premature ventricular complexes  Rightward axis  Cannot rule out Anteroseptal infarct , age undetermined  Abnormal ECG    Confirmed by Chin Liao ([id number]) on 12/19/2019 1:34:17 PM (12-19-19 @ 11:19)      MEDICATIONS      ANTIBIOTICS:      ALLERGIES:  No Known Allergies

## 2019-12-19 NOTE — ED PROVIDER NOTE - CARE PROVIDER_API CALL
Sofia Paulino)  Internal Medicine  [address] [place] [address]  Phone: [phone]  Fax: [phone]  Follow Up Time: 1-3 days    Len Foote)  Cardiovascular Disease; Internal Medicine; Interventional Cardiology  [address] [place] [address]  Phone: [phone]  Fax: [phone]  Follow Up Time: 1-3 days

## 2019-12-19 NOTE — ED PROVIDER NOTE - ATTENDING CONTRIBUTION TO CARE
I personally evaluated the patient. I reviewed the Resident’s or Physician Assistant’s note (as assigned above), and agree with the findings and plan except as documented in my note.    Pt is a 48 y/o male with hx of DM, DLD, CAD s/p CABG, HIV (undetectable viral load), presents to ED for SOB over the past week, moderate, constant, worse with exertion or lying flat. + intermittent left sided chest pain, cough. No fever, vomiting, abd pain. Pt sent in by Dr. Paulino but decreased breath sounds on left.    Constitutional: Well developed, well nourished. NAD.  Head: Normocephalic, atraumatic.  Eyes: PERRL. EOMI.  ENT: No nasal discharge. Mucous membranes moist.  Neck: Supple. Painless ROM.  Cardiovascular: Normal S1, S2. Regular rate and rhythm. No murmurs, rubs, or gallops.  Pulmonary: Normal respiratory rate and effort. Decreased breath sounds in left base.  Abdominal: Soft. Nondistended. Nontender. No rebound, guarding, rigidity.  Extremities. Pelvis stable. No lower extremity edema, symmetric calves.  Skin: No rashes, cyanosis.  Neuro: AAOx3. No focal neurological deficits.  Psych: Normal mood. Normal affect.

## 2019-12-19 NOTE — CONSULT NOTE ADULT - ASSESSMENT
ASSESSMENT  49 yo M with HIV on ART, DM, DLD, s/p CABG 9/2019, post-op course with fever, transaminitis, pericardial effusion now sent in from routine PCP appt with a few days of JIMÉNEZ and intermittent chest tightness. Reports rhinorrhea, no sore throat, dry cough. No fever or chills. Reports children at home have URIs. Reports JIMÉNEZ, especially on stairs. A week or so ago had CP during Cardiac rehab. Currently no CP.     Pt is afebrile and No leukocytosis. Trop neg x1. BNP wnl. EKG changes stable since last EKG. Recent outpatient TTE without acute pathology. CXR L small effusion (overall improved since 9/2019).    Suspect viral URI.   ED attending discussed case with pt's Cardiologist bethany Foote for d/c home with close follow-up.    - Monitor off abx  - Consider home inhaler  - Continue ART

## 2019-12-19 NOTE — ED PROVIDER NOTE - CLINICAL SUMMARY MEDICAL DECISION MAKING FREE TEXT BOX
Pt presented to ED for intermittent nonexertional chest pain and mild SOB when laying flat, sent for CXR for abnormal lung sounds. Labs, EKG, CXR obtained and reviewed. No acute ischemic changes. PT evaluated by Dr. Paulino in ED and case discussed with DR. Foote, cardiology. Recommend otpt f/up. Results reviewed and discussed with pt and printed for patient. Anticipatory guidance given including close outpatient followup. Strict return precautions given. Pt verbalizes understanding of and agrees with plan.

## 2019-12-19 NOTE — ED PROVIDER NOTE - PHYSICAL EXAMINATION
Gen: NAD, AOx3  Head: NCAT  HEENT: PERRL, oral mucosa moist, normal conjunctiva, oropharynx clear without exudate or erythema  Lung: no respiratory distress, decreased L breath sound  CV: normal s1/s2, rrr, Normal perfusion, pulses 2+ throughout  Abd: soft, NTND, no CVA tenderness  Genitourinary: no pelvic tenderness  MSK: No edema, no visible deformities, full range of motion in all 4 extremities  Neuro: No focal neurologic deficits  Skin: No rash   Psych: normal affect

## 2019-12-19 NOTE — ED PROVIDER NOTE - PROGRESS NOTE DETAILS
PODLOG: Pt evaluated by Dr. Paulino in ED. Discussed with Dr. Foote as well. Pt with recent cardiology visit with normal workup. Will d/c patient with outpt f/up and strict return precautions.

## 2019-12-19 NOTE — ED PROVIDER NOTE - PROVIDER TOKENS
PROVIDER:[TOKEN:[[id number]],FOLLOWUP:[1-3 days]],PROVIDER:[TOKEN:[[id number]],FOLLOWUP:[1-3 days]]

## 2019-12-19 NOTE — ED PROVIDER NOTE - PATIENT PORTAL LINK FT
You can access the FollowMyHealth Patient Portal offered by [facility] by registering at the following website: [email or web address]. By joining [employer]’s FollowMyHealth portal, you will also be able to view your health information using other applications (apps) compatible with our system.

## 2019-12-20 DIAGNOSIS — B20 HUMAN IMMUNODEFICIENCY VIRUS [HIV] DISEASE: ICD-10-CM

## 2019-12-20 DIAGNOSIS — Z02.9 ENCOUNTER FOR ADMINISTRATIVE EXAMINATIONS, UNSPECIFIED: ICD-10-CM

## 2019-12-23 NOTE — PROGRESS NOTE ADULT - SUBJECTIVE AND OBJECTIVE BOX
BRISEIDA SIMON  49y, Male  Allergy: No Known Allergies      CHIEF COMPLAINT: JIMÉNEZ (19 Dec 2019 16:05)      INTERVAL EVENTS/HPI  - No acute events overnight  - T(F): --  - Denies any worsening symptoms  - Tolerating medication  -     ROS  General: Denies fevers, chills, nightsweats, weight loss  HEENT: Denies headache, rhinorrhea, sore throat, eye pain  CV: Denies CP, palpitations  PULM: Denies SOB, cough  GI: Denies abdominal pain, diarrhea  : Denies dysuria, hematuria  MSK: Denies arthralgias  SKIN: Denies rash   NEURO: Denies paresthesias, weakness  PSYCH: Denies depression    FH non-contributory   Social Hx non-contributory    VITALS:  T(F): --  HR: --  BP: --  RR: --Vital Signs Last 24 Hrs  T(C): --  T(F): --  HR: --  BP: --  BP(mean): --  RR: --  SpO2: --    PHYSICAL EXAM:  Gen: NAD, resting in bed  HEENT: Normocephalic, atraumatic  Neck: supple, no lymphadenopathy  CV: Regular rate & regular rhythm  Lungs: decreased BS at bases, no fremitus  Abdomen: Soft, BS present  Ext: Warm, well perfused  Neuro: non focal, awake  Skin: no rash, no erythema      TESTS & MEASUREMENTS:                    Blood Gas Venous - Lactate: 1.3 mmoL/L (12-19-19 @ 12:26)      INFECTIOUS DISEASES TESTING  MRSA PCR Result.: Negative (09-08-19 @ 17:59)  HIV-1/2 Combo Result: Reactive (09-02-19 @ 05:44)      RADIOLOGY & ADDITIONAL TESTS:  I have personally reviewed the last Chest xray  CXR      CT      CARDIOLOGY TESTING  12 Lead ECG:   Ventricular Rate 84 BPM    Atrial Rate 84 BPM    P-R Interval 178 ms    QRS Duration 102 ms    Q-T Interval 328 ms    QTC Calculation(Bezet) 387 ms    P Axis 41 degrees    R Axis 99 degrees    T Axis 60 degrees    Diagnosis Line Sinus rhythm with occasional Premature ventricular complexes  Rightward axis  Cannot rule out Anteroseptal infarct , age undetermined  Abnormal ECG    Confirmed by Chin Liao ([id number]) on 12/19/2019 1:34:17 PM (12-19-19 @ 11:19)      MEDICATIONS      ANTIBIOTICS:      All available historical data has been reviewed

## 2019-12-23 NOTE — PROGRESS NOTE ADULT - ASSESSMENT
47 yo M with HIV on ART, DM, DLD, s/p CABG 9/2019, post-op course with fever, transaminitis, pericardial effusion now sent in from routine PCP appt with a few days of JIMÉNEZ and intermittent chest tightness. Reports rhinorrhea, no sore throat, dry cough. No fever or chills. Reports children at home have URIs. Reports JIMÉNEZ, especially on stairs. A week or so ago had CP during Cardiac rehab. Currently no CP.     Pt is afebrile and no leukocytosis. Trop neg x1. BNP wnl. EKG changes stable since last EKG. Recent outpatient TTE without acute pathology. CXR L small effusion (overall improved since 9/2019).    Suspect viral URI.   ED attending discussed case with pt's Cardiologist Qamar    - Monitor off abx  - Consider home inhaler  - Continue ART  D/C soon

## 2019-12-29 DIAGNOSIS — Z79.899 OTHER LONG TERM (CURRENT) DRUG THERAPY: ICD-10-CM

## 2019-12-29 DIAGNOSIS — Z21 ASYMPTOMATIC HUMAN IMMUNODEFICIENCY VIRUS [HIV] INFECTION STATUS: ICD-10-CM

## 2019-12-29 DIAGNOSIS — Z79.891 LONG TERM (CURRENT) USE OF OPIATE ANALGESIC: ICD-10-CM

## 2019-12-29 DIAGNOSIS — I10 ESSENTIAL (PRIMARY) HYPERTENSION: ICD-10-CM

## 2019-12-29 DIAGNOSIS — Z79.02 LONG TERM (CURRENT) USE OF ANTITHROMBOTICS/ANTIPLATELETS: ICD-10-CM

## 2019-12-29 DIAGNOSIS — Z95.1 PRESENCE OF AORTOCORONARY BYPASS GRAFT: ICD-10-CM

## 2019-12-29 DIAGNOSIS — J90 PLEURAL EFFUSION, NOT ELSEWHERE CLASSIFIED: ICD-10-CM

## 2019-12-29 DIAGNOSIS — I25.10 ATHEROSCLEROTIC HEART DISEASE OF NATIVE CORONARY ARTERY WITHOUT ANGINA PECTORIS: ICD-10-CM

## 2019-12-29 DIAGNOSIS — Z79.82 LONG TERM (CURRENT) USE OF ASPIRIN: ICD-10-CM

## 2019-12-29 DIAGNOSIS — E11.9 TYPE 2 DIABETES MELLITUS WITHOUT COMPLICATIONS: ICD-10-CM

## 2019-12-29 DIAGNOSIS — J06.9 ACUTE UPPER RESPIRATORY INFECTION, UNSPECIFIED: ICD-10-CM

## 2019-12-29 DIAGNOSIS — E78.00 PURE HYPERCHOLESTEROLEMIA, UNSPECIFIED: ICD-10-CM

## 2020-01-03 ENCOUNTER — APPOINTMENT (OUTPATIENT)
Dept: CARDIOLOGY | Facility: CLINIC | Age: 50
End: 2020-01-03
Payer: COMMERCIAL

## 2020-01-03 PROCEDURE — 93000 ELECTROCARDIOGRAM COMPLETE: CPT

## 2020-01-03 PROCEDURE — 99214 OFFICE O/P EST MOD 30 MIN: CPT

## 2020-01-03 NOTE — PHYSICAL EXAM
[General Appearance - Well Nourished] : well nourished [General Appearance - Well Developed] : well developed [Well Groomed] : well groomed [Normal Appearance] : normal appearance [No Deformities] : no deformities [General Appearance - In No Acute Distress] : no acute distress [Normal Conjunctiva] : the conjunctiva exhibited no abnormalities [Normal Oral Mucosa] : normal oral mucosa [No Oral Pallor] : no oral pallor [Respiration, Rhythm And Depth] : normal respiratory rhythm and effort [] : no respiratory distress [Auscultation Breath Sounds / Voice Sounds] : lungs were clear to auscultation bilaterally [Heart Rate And Rhythm] : heart rate and rhythm were normal [Heart Sounds] : normal S1 and S2 [FreeTextEntry1] : sternotomy scar [Murmurs] : no murmurs present [Edema] : no peripheral edema present [Bowel Sounds] : normal bowel sounds [Abdomen Tenderness] : non-tender [Abdomen Soft] : soft [Nail Clubbing] : no clubbing of the fingernails [Abnormal Walk] : normal gait [Cyanosis, Localized] : no localized cyanosis [Skin Color & Pigmentation] : normal skin color and pigmentation [No Venous Stasis] : no venous stasis [Petechial Hemorrhages (___cm)] : no petechial hemorrhages [Oriented To Time, Place, And Person] : oriented to person, place, and time [Affect] : the affect was normal

## 2020-01-03 NOTE — REASON FOR VISIT
[Coronary Artery Disease] : coronary artery disease [Follow-Up - Clinic] : a clinic follow-up of [CABG Follow-up] : bypass graft

## 2020-01-03 NOTE — HISTORY OF PRESENT ILLNESS
[FreeTextEntry1] : 50 yo M with HIV on ART, DM (HgA1C 6.3), DLD, recent admission to [facility] - ECG was abnormal, cardiac Cath c/w with multivessel disease, reduced EF. Patient underwent CABG x 4 (LIMA- LAD, SVG- OM1, SVG- OM2, radial- PDA) on 9/10/19. Patient tolerated procedure well, and had no issues coming off bypass.  Post op EF improved to 35% from 25% preop. Repeat echo - EF 45%.\par Post-operatively, patient required diuresis for fluid overload. Patient was started on metoprolol for heart rate control, however due to borderline bp he was not able to tolerated isordil or ACE-I as well. Patient was not started on statin due to elevated lfts. Previously followed with Dr. Griffin, but would like to switch. No CP, dyspnea, orthopnea or PND, Edema resolved.

## 2020-01-03 NOTE — DISCUSSION/SUMMARY
[FreeTextEntry1] : C/w ASA, Palvix\par Started rehab.\par Restarted Crestor at 10 mg. Tolerating. LDL 87. Increase to 20 mg. \par Results of 2D echo noted. No need for AICD.\par C/w BB - change to succinate\par Low BP - unable to start Entresto or ARB. Will monitor, will consider again next visit\par \par GI follow-up. Consider PPI (Protonix) for David's\par F/u in 3 months. Check labs before next visit.

## 2020-01-04 ENCOUNTER — RX RENEWAL (OUTPATIENT)
Age: 50
End: 2020-01-04

## 2020-01-16 NOTE — ED ADULT NURSE NOTE - PMH
Diabetes    HIV (human immunodeficiency virus infection)    Hypercholesteremia
FREE:[LAST:[Freddie],FIRST:[Kingsley],PHONE:[(   )    -],FAX:[(   )    -],ADDRESS:[Dr. Kingsley Frazier    [address] [place], [place] [address] [phone]],FOLLOWUP:[4-6 Days]],PROVIDER:[TOKEN:[[id number]],FOLLOWUP:[7-10 Days]]

## 2020-02-10 ENCOUNTER — APPOINTMENT (OUTPATIENT)
Dept: CARDIOLOGY | Facility: CLINIC | Age: 50
End: 2020-02-10
Payer: COMMERCIAL

## 2020-02-10 VITALS
HEIGHT: 68 IN | SYSTOLIC BLOOD PRESSURE: 106 MMHG | BODY MASS INDEX: 30.92 KG/M2 | HEART RATE: 75 BPM | WEIGHT: 204 LBS | DIASTOLIC BLOOD PRESSURE: 74 MMHG

## 2020-02-10 PROCEDURE — 93000 ELECTROCARDIOGRAM COMPLETE: CPT

## 2020-02-10 PROCEDURE — 99213 OFFICE O/P EST LOW 20 MIN: CPT

## 2020-02-11 NOTE — PHYSICAL EXAM
[General Appearance - Well Developed] : well developed [Well Groomed] : well groomed [Normal Appearance] : normal appearance [General Appearance - In No Acute Distress] : no acute distress [General Appearance - Well Nourished] : well nourished [No Deformities] : no deformities [No Oral Pallor] : no oral pallor [Normal Conjunctiva] : the conjunctiva exhibited no abnormalities [Normal Oral Mucosa] : normal oral mucosa [] : no respiratory distress [Respiration, Rhythm And Depth] : normal respiratory rhythm and effort [Auscultation Breath Sounds / Voice Sounds] : lungs were clear to auscultation bilaterally [Heart Sounds] : normal S1 and S2 [Heart Rate And Rhythm] : heart rate and rhythm were normal [Edema] : no peripheral edema present [Murmurs] : no murmurs present [Bowel Sounds] : normal bowel sounds [Abdomen Soft] : soft [Abnormal Walk] : normal gait [Abdomen Tenderness] : non-tender [Nail Clubbing] : no clubbing of the fingernails [Petechial Hemorrhages (___cm)] : no petechial hemorrhages [Cyanosis, Localized] : no localized cyanosis [Skin Color & Pigmentation] : normal skin color and pigmentation [Oriented To Time, Place, And Person] : oriented to person, place, and time [Affect] : the affect was normal [No Venous Stasis] : no venous stasis [FreeTextEntry1] : sternotomy scar

## 2020-02-11 NOTE — HISTORY OF PRESENT ILLNESS
[FreeTextEntry1] : 48 yo M with HIV on ART, DM (HgA1C 6.3), DLD, recent admission to [facility] - ECG was abnormal, cardiac Cath c/w with multivessel disease, reduced EF. Patient underwent CABG x 4 (LIMA- LAD, SVG- OM1, SVG- OM2, radial- PDA) on 9/10/19. Patient tolerated procedure well, and had no issues coming off bypass.  Post op EF improved to 35% from 25% preop. Repeat echo - EF 45%.\par Post-operatively, patient required diuresis for fluid overload. Patient was started on metoprolol for heart rate control, however due to borderline BP he was not able to tolerated Isordil or ACE-I as well. Patient was not started on statin due to elevated lfts. Previously followed with Dr. Griffin, but would like to switch. No CP, dyspnea, orthopnea or PND, Edema resolved. + Constipation.

## 2020-02-11 NOTE — DISCUSSION/SUMMARY
[FreeTextEntry1] : C/w ASA, Palvix\par Started rehab.\par Restarted Crestor at 10 mg. Tolerating. LDL 87. Increase to 20 mg. \par Results of 2D echo noted. No need for AICD at tis time.\par C/w BB - change to succinate\par Low BP - unable to start Entresto or ARB. Will monitor, will consider again next visit\par \par GI follow-up. Consider PPI (Protonix) for David's - f/u with GI\par F/u in 3 months. Check labs before next visit.

## 2020-03-04 ENCOUNTER — OUTPATIENT (OUTPATIENT)
Dept: OUTPATIENT SERVICES | Facility: HOSPITAL | Age: 50
LOS: 1 days | Discharge: HOME | End: 2020-03-04

## 2020-03-04 DIAGNOSIS — I25.10 ATHEROSCLEROTIC HEART DISEASE OF NATIVE CORONARY ARTERY WITHOUT ANGINA PECTORIS: ICD-10-CM

## 2020-03-04 DIAGNOSIS — I25.810 ATHEROSCLEROSIS OF CORONARY ARTERY BYPASS GRAFT(S) WITHOUT ANGINA PECTORIS: ICD-10-CM

## 2020-03-04 DIAGNOSIS — Z90.49 ACQUIRED ABSENCE OF OTHER SPECIFIED PARTS OF DIGESTIVE TRACT: Chronic | ICD-10-CM

## 2020-05-05 ENCOUNTER — APPOINTMENT (OUTPATIENT)
Dept: CARDIOLOGY | Facility: CLINIC | Age: 50
End: 2020-05-05
Payer: COMMERCIAL

## 2020-05-05 PROCEDURE — 99213 OFFICE O/P EST LOW 20 MIN: CPT | Mod: 95

## 2020-05-05 NOTE — ASSESSMENT
[FreeTextEntry1] : CAD, s/p CABG for multivessel disease\par DM\par DL\par Borderline BP.\par LFT's reviewed\par Cath report, CABG report, echo, hospital notes reviewed.\par Elevated hemidiaphragm - f/u with pulmonary, CT.\par Return in 3 months - will review labs at that time.\par \par Due to COVID-19 epidemic, a telemedicine visit was conducted, using audio and video connection. Patient gave verbal consent to telemedicine visit.\par

## 2020-05-05 NOTE — PHYSICAL EXAM
[Normal Appearance] : normal appearance [General Appearance - Well Developed] : well developed [Well Groomed] : well groomed [General Appearance - Well Nourished] : well nourished [General Appearance - In No Acute Distress] : no acute distress [No Deformities] : no deformities [Normal Conjunctiva] : the conjunctiva exhibited no abnormalities [FreeTextEntry1] : no JVD [] : no respiratory distress [Affect] : the affect was normal [Oriented To Time, Place, And Person] : oriented to person, place, and time

## 2020-05-05 NOTE — HISTORY OF PRESENT ILLNESS
[Other Location: e.g. School (Enter Location, City,State)___] : at [unfilled], at the time of the visit. [Medical Office: ([facility])___] : at the medical office located in  [Patient] : the patient [Self] : self [FreeTextEntry1] : 48 yo M with HIV on ART, DM (HgA1C 6.3), DLD, multivessel disease, reduced EF. Patient underwent CABG x 4 (LIMA- LAD, SVG- OM1, SVG- OM2, radial- PDA) on 9/10/19.  Post op EF improved to 35% from 25% preop. Repeat echo - EF 45%.\par Post-operatively, patient required diuresis for fluid overload. Patient was started on metoprolol for heart rate control, however due to borderline BP he was not able to tolerated Isordil or ACE-I as well. Patient was not started on statin due to elevated lfts. Previously followed with Dr. Griffin, but would like to switch. No CP, dyspnea, orthopnea or PND, Edema resolved. Was seen by dr. Harris for pulmonary evaluation - has elevated hemidiaphragm - likely post-op. Will have a follow-up with him. No labs yet.

## 2020-08-04 ENCOUNTER — APPOINTMENT (OUTPATIENT)
Dept: CARDIOLOGY | Facility: CLINIC | Age: 50
End: 2020-08-04
Payer: COMMERCIAL

## 2020-08-04 PROCEDURE — 99214 OFFICE O/P EST MOD 30 MIN: CPT

## 2020-08-04 PROCEDURE — 93000 ELECTROCARDIOGRAM COMPLETE: CPT

## 2020-08-04 NOTE — PHYSICAL EXAM
[General Appearance - Well Developed] : well developed [Normal Appearance] : normal appearance [Well Groomed] : well groomed [No Deformities] : no deformities [General Appearance - In No Acute Distress] : no acute distress [General Appearance - Well Nourished] : well nourished [Normal Conjunctiva] : the conjunctiva exhibited no abnormalities [Normal Oral Mucosa] : normal oral mucosa [No Oral Pallor] : no oral pallor [] : no respiratory distress [Auscultation Breath Sounds / Voice Sounds] : lungs were clear to auscultation bilaterally [Respiration, Rhythm And Depth] : normal respiratory rhythm and effort [Heart Sounds] : normal S1 and S2 [Heart Rate And Rhythm] : heart rate and rhythm were normal [Murmurs] : no murmurs present [Edema] : no peripheral edema present [FreeTextEntry1] : sternotomy scar [Abdomen Soft] : soft [Bowel Sounds] : normal bowel sounds [Abnormal Walk] : normal gait [Abdomen Tenderness] : non-tender [Cyanosis, Localized] : no localized cyanosis [Nail Clubbing] : no clubbing of the fingernails [Petechial Hemorrhages (___cm)] : no petechial hemorrhages [Skin Color & Pigmentation] : normal skin color and pigmentation [Oriented To Time, Place, And Person] : oriented to person, place, and time [No Venous Stasis] : no venous stasis [Affect] : the affect was normal

## 2020-08-04 NOTE — REASON FOR VISIT
[Follow-Up - Clinic] : a clinic follow-up of [CABG Follow-up] : bypass graft [Coronary Artery Disease] : coronary artery disease

## 2020-08-04 NOTE — DISCUSSION/SUMMARY
[FreeTextEntry1] : C/w ASA, Palvix\par Started rehab.\par Restart Crestor 20 mg. Add Vascepa. Check Labs in 4-6 weeks.\par Results of 2D echo noted. No need for AICD at tis time.\par C/w BB - change to succinate\par Low BP - unable to start Entresto or ARB. Will monitor, will consider again next visit\par \par GI follow-up. Consider PPI (Protonix) for David's - f/u with GI\par F/u with pulmonary.\par F/u in 3 months. Check labs before next visit.

## 2020-08-04 NOTE — HISTORY OF PRESENT ILLNESS
[FreeTextEntry1] : 50 yo M with HIV on ART, DM (HgA1C 6.3), DLD, recent admission to [facility] - ECG was abnormal, cardiac Cath c/w with multivessel disease, reduced EF. Patient underwent CABG x 4 (LIMA- LAD, SVG- OM1, SVG- OM2, radial- PDA) on 9/10/19. Patient tolerated procedure well, and had no issues coming off bypass.  Post op EF improved to 35% from 25% preop. Repeat echo - EF 45%.\par Post-operatively, patient required diuresis for fluid overload. Patient was started on metoprolol for heart rate control, however due to borderline BP he was not able to tolerated Isordil or ACE-I as well. Patient was not started on statin due to elevated LFT. Previously followed with Dr. Griffin, but would like to switch. No CP, dyspnea, orthopnea or PND, Edema resolved. + Constipation.\par + bloating. His LFTs were elevated, and his Crestor was stopped. His TG went up. LFT's actually increased despite stopping Crestor. he has severe fatty liver disease.

## 2020-08-12 ENCOUNTER — RX RENEWAL (OUTPATIENT)
Age: 50
End: 2020-08-12

## 2020-10-26 ENCOUNTER — OUTPATIENT (OUTPATIENT)
Dept: OUTPATIENT SERVICES | Facility: HOSPITAL | Age: 50
LOS: 1 days | Discharge: HOME | End: 2020-10-26

## 2020-10-26 DIAGNOSIS — Z90.49 ACQUIRED ABSENCE OF OTHER SPECIFIED PARTS OF DIGESTIVE TRACT: Chronic | ICD-10-CM

## 2020-10-30 ENCOUNTER — RX RENEWAL (OUTPATIENT)
Age: 50
End: 2020-10-30

## 2020-11-02 ENCOUNTER — LABORATORY RESULT (OUTPATIENT)
Age: 50
End: 2020-11-02

## 2020-11-03 ENCOUNTER — NON-APPOINTMENT (OUTPATIENT)
Age: 50
End: 2020-11-03

## 2020-11-03 ENCOUNTER — APPOINTMENT (OUTPATIENT)
Dept: CARDIOLOGY | Facility: CLINIC | Age: 50
End: 2020-11-03
Payer: COMMERCIAL

## 2020-11-03 VITALS
SYSTOLIC BLOOD PRESSURE: 118 MMHG | DIASTOLIC BLOOD PRESSURE: 71 MMHG | HEIGHT: 68 IN | BODY MASS INDEX: 30.31 KG/M2 | WEIGHT: 200 LBS | HEART RATE: 81 BPM | TEMPERATURE: 98.1 F

## 2020-11-03 PROCEDURE — 99072 ADDL SUPL MATRL&STAF TM PHE: CPT

## 2020-11-03 PROCEDURE — 99213 OFFICE O/P EST LOW 20 MIN: CPT

## 2020-11-03 PROCEDURE — 93000 ELECTROCARDIOGRAM COMPLETE: CPT

## 2020-11-03 NOTE — DISCUSSION/SUMMARY
[FreeTextEntry1] : C/w ASA, Palvix\par Started rehab.\par Restart Crestor 20 mg. C/w Vascepa and increase the dose, as his TG are still elevated. Check Labs in 3 months.\par Results of 2D echo noted. No need for AICD at tis time.\par C/w BB - changed to succinate\par Low BP - unable to start Entresto or ARB. Will monitor, will consider again next visit\par \par GI follow-up. Consider PPI (Protonix) for David's - f/u with GI\par F/u with pulmonary.\par F/u in 3 months. Check labs before next visit.

## 2020-11-03 NOTE — HISTORY OF PRESENT ILLNESS
[FreeTextEntry1] : 50 yo M with HIV on ART, DM (HgA1C 6.3), DLD, recent admission to [facility] - ECG was abnormal, cardiac Cath c/w with multivessel disease, reduced EF. Patient underwent CABG x 4 (LIMA- LAD, SVG- OM1, SVG- OM2, radial- PDA) on 9/10/19. Patient tolerated procedure well, and had no issues coming off bypass.  Post op EF improved to 35% from 25% preop. Repeat echo - EF 45-50%.\par Post-operatively, patient required diuresis for fluid overload. Patient was started on metoprolol for heart rate control, however due to borderline BP he was not able to tolerated Isordil or ACE-I as well. Patient was not started on statin due to elevated LFT. Previously followed with Dr. Griffin, but would like to switch. No CP, dyspnea, orthopnea or PND, Edema resolved. + Constipation.\par + bloating. His LFTs were elevated, and his Crestor was stopped. His TG went up. LFT's actually increased despite stopping Crestor. he has severe fatty liver disease.\par Had PET scan at [facility] - reportedly negative.

## 2020-11-03 NOTE — PHYSICAL EXAM
[General Appearance - Well Developed] : well developed [Normal Appearance] : normal appearance [Well Groomed] : well groomed [General Appearance - Well Nourished] : well nourished [No Deformities] : no deformities [General Appearance - In No Acute Distress] : no acute distress [Normal Conjunctiva] : the conjunctiva exhibited no abnormalities [] : no respiratory distress [Respiration, Rhythm And Depth] : normal respiratory rhythm and effort [Auscultation Breath Sounds / Voice Sounds] : lungs were clear to auscultation bilaterally [Heart Rate And Rhythm] : heart rate and rhythm were normal [Heart Sounds] : normal S1 and S2 [Murmurs] : no murmurs present [Edema] : no peripheral edema present [FreeTextEntry1] : sternotomy scar [Bowel Sounds] : normal bowel sounds [Abdomen Soft] : soft [Abdomen Tenderness] : non-tender [Abnormal Walk] : normal gait [Nail Clubbing] : no clubbing of the fingernails [Cyanosis, Localized] : no localized cyanosis [Petechial Hemorrhages (___cm)] : no petechial hemorrhages [Skin Color & Pigmentation] : normal skin color and pigmentation [No Venous Stasis] : no venous stasis [Oriented To Time, Place, And Person] : oriented to person, place, and time [Affect] : the affect was normal

## 2020-11-11 ENCOUNTER — RX RENEWAL (OUTPATIENT)
Age: 50
End: 2020-11-11

## 2021-02-02 ENCOUNTER — APPOINTMENT (OUTPATIENT)
Dept: CARDIOLOGY | Facility: CLINIC | Age: 51
End: 2021-02-02
Payer: COMMERCIAL

## 2021-02-02 VITALS
TEMPERATURE: 98.1 F | HEART RATE: 83 BPM | BODY MASS INDEX: 30.41 KG/M2 | DIASTOLIC BLOOD PRESSURE: 72 MMHG | WEIGHT: 200 LBS | SYSTOLIC BLOOD PRESSURE: 108 MMHG

## 2021-02-02 LAB
CHOLEST SERPL-MCNC: 146 MG/DL
HDLC SERPL-MCNC: 33 MG/DL
LDLC SERPL CALC-MCNC: 49 MG/DL
NONHDLC SERPL-MCNC: 113 MG/DL
TRIGL SERPL-MCNC: 321 MG/DL

## 2021-02-02 PROCEDURE — 93000 ELECTROCARDIOGRAM COMPLETE: CPT

## 2021-02-02 PROCEDURE — 99214 OFFICE O/P EST MOD 30 MIN: CPT

## 2021-02-02 PROCEDURE — 99072 ADDL SUPL MATRL&STAF TM PHE: CPT

## 2021-02-02 NOTE — DISCUSSION/SUMMARY
[Patient] : the patient [Minutes spent___] : for [unfilled] ~Uminutes [FreeTextEntry1] : C/w ASA, Palvix\par Started rehab.\par Restart Crestor 20 mg. C/w Vascepa and increase the dose to 3 a day, as his TG are still elevated. Check Labs in 6 months.\par Results of 2D echo noted. No need for AICD at tis time.\par C/w BB - changed to succinate\par Low BP - unable to start Entresto or ARB. \par \par GI follow-up. Consider PPI (Protonix) for David's - f/u with GI\par F/u with pulmonary.\par F/u in 3 months. Check labs before next visit.

## 2021-02-02 NOTE — REVIEW OF SYSTEMS
History     Chief Complaint:  Rash     HPI:   History provided by mother secondary to patient's age.    Amado Ochoa is a 12 month old, otherwise healthy, fully immunized male who presents with a rash. The patient's mother report that they picked the patient up from  this afternoon when they noticed splotchy red areas on his face, arms, and lower legs and feet bilaterally. They note that last week he had pineapple for the first time and his eyes puffed up shortly after eating this, but never progressed further. Today, the patient was given pineapple at . In addition, they state that he was ill over the weekend with nausea, vomiting, and diarrhea, but has never had a fever. They deny evidence of breathing issues, trouble swallowing, and have otherwise not used any new household products. Prior to arrival, the patient received Benadryl.    Allergies:  No known drug allergies      Medications:    Vitamin A, D & C     Past Medical History:    Cow's milk protein sensitivity    Past Surgical History:    Ear tube placement    Family History:    History reviewed. No pertinent family history.      Social History:  Immunization Status: Fully immunized.  Presents with mother at bedside.      Review of Systems   Constitutional: Negative for fever.   HENT: Negative for trouble swallowing.    Respiratory: Negative for apnea.    Skin: Positive for rash.   All other systems reviewed and are negative.      Physical Exam     Patient Vitals for the past 24 hrs:   Temp Temp src Pulse Resp SpO2 Weight   09/25/17 1824 96.4  F (35.8  C) Rectal - - - 10.7 kg (23 lb 10.8 oz)   09/25/17 1813 - - 116 28 100 % -      Physical Exam:  Nursing note and vitals reviewed.     General: Alert and oriented.   HEENT: Mucous membranes moist, no visible swelling to lips, tongue, or posterior oropharynx, no difficulty controlling secretions.  CV: Regular rate.  Resp: Clear throughout, no crackles or wheezing, no stridor.  GI: Abdomen  soft and non-tender.  MSK: No bony tenderness.  Skin: Scattered urticaria over bilateral upper extremities, left ankle, and left ear.  Neuro: Alert, acting appropriate for age.   Psych: Normal affect and mood.     Emergency Department Course     Emergency Department Course:  Past medical records, nursing notes, and vitals reviewed.  1828: I performed an exam of the patient as documented above. Clinical findings and plan explained to the mother. Patient discharged home with instructions regarding supportive care, medications, and reasons to return as well as the importance of close follow-up were reviewed.      Impression & Plan      Medical Decision Making:  Amado Ochoa is a 12 month old male who presented with generalized urticaria.  The precipitant appears to be unclear.  There is no oropharyngeal swelling, respiratory distress, or GI symptoms to suggest anaphylaxis.  I have recommended Benadryl and Prednisone given the broad distribution. Recommended close follow up with pediatrician in 2-3 days to ensure improving and also recommended discussing formal allergy testing with pediatrician. Advised to avoid pineapple in the interim as this may potentially be the cause. I advised the patient's mother to return for worse itching, oropharyngeal swelling, difficulty breathing, or for any other concerning symptoms. The patient 's mother was in agreement with plan and discharged in satisfactory condition with all questions answered.      Diagnosis:    ICD-10-CM    1. Urticaria L50.9      Disposition:  Discharged to home with Prednisone.    Discharge Medications:  New Prescriptions    PREDNISOLONE (ORAPRED/PRELONE) 15 MG/5ML SOLUTION    Take 3.3 mLs (9.9 mg) by mouth daily for 5 days       Alana Fitzgerald  9/25/2017   [facility] EMERGENCY DEPARTMENT    IAlana, am serving as a scribe at 6:28 PM on 9/25/2017 to document services personally performed by Marta Arellano PA-C based on my  observations and the provider's statements to me.       Marta Arellano PA-C  09/25/17 1953     [see HPI] : see HPI [Negative] : Heme/Lymph

## 2021-02-02 NOTE — HISTORY OF PRESENT ILLNESS
[FreeTextEntry1] : 51 yo M with HIV on ART, DM (HgA1C 7.3), DLD, had ACS in 2019, cardiac Cath c/w with multivessel disease, reduced EF. Patient underwent CABG x 4 (LIMA- LAD, SVG- OM1, SVG- OM2, radial- PDA) on 9/10/19. Patient tolerated procedure well, and had no issues coming off bypass.  Post op EF improved to 35% from 25% preop. Repeat echo - EF 45-50%.\par \par + bloating. His LFTs were elevated, and his Crestor was stopped. His TG went up. LFT's actually increased despite stopping Crestor. he has severe fatty liver disease.\par Had PET scan at [facility] - reportedly negative.

## 2021-02-10 ENCOUNTER — RX RENEWAL (OUTPATIENT)
Age: 51
End: 2021-02-10

## 2021-02-17 ENCOUNTER — NON-APPOINTMENT (OUTPATIENT)
Age: 51
End: 2021-02-17

## 2021-02-17 ENCOUNTER — RX RENEWAL (OUTPATIENT)
Age: 51
End: 2021-02-17

## 2021-02-26 ENCOUNTER — TRANSCRIPTION ENCOUNTER (OUTPATIENT)
Age: 51
End: 2021-02-26

## 2021-03-12 ENCOUNTER — RX RENEWAL (OUTPATIENT)
Age: 51
End: 2021-03-12

## 2021-06-22 ENCOUNTER — RX RENEWAL (OUTPATIENT)
Age: 51
End: 2021-06-22

## 2021-07-28 ENCOUNTER — RX RENEWAL (OUTPATIENT)
Age: 51
End: 2021-07-28

## 2021-07-29 ENCOUNTER — RX RENEWAL (OUTPATIENT)
Age: 51
End: 2021-07-29

## 2021-08-09 LAB
ALBUMIN SERPL ELPH-MCNC: 4.9 G/DL
ALP BLD-CCNC: 74 U/L
ALT SERPL-CCNC: 49 U/L
ANION GAP SERPL CALC-SCNC: 13 MMOL/L
AST SERPL-CCNC: 39 U/L
BASOPHILS # BLD AUTO: 0.04 K/UL
BASOPHILS NFR BLD AUTO: 0.7 %
BILIRUB SERPL-MCNC: 0.6 MG/DL
BUN SERPL-MCNC: 21 MG/DL
CALCIUM SERPL-MCNC: 9.5 MG/DL
CHLORIDE SERPL-SCNC: 104 MMOL/L
CHOLEST SERPL-MCNC: 145 MG/DL
CO2 SERPL-SCNC: 21 MMOL/L
CREAT SERPL-MCNC: 1.1 MG/DL
EOSINOPHIL # BLD AUTO: 0.13 K/UL
EOSINOPHIL NFR BLD AUTO: 2.2 %
ESTIMATED AVERAGE GLUCOSE: 151 MG/DL
GLUCOSE SERPL-MCNC: 143 MG/DL
HBA1C MFR BLD HPLC: 6.9 %
HCT VFR BLD CALC: 45.3 %
HDLC SERPL-MCNC: 35 MG/DL
HGB BLD-MCNC: 14.9 G/DL
IMM GRANULOCYTES NFR BLD AUTO: 0.3 %
LDLC SERPL CALC-MCNC: 77 MG/DL
LYMPHOCYTES # BLD AUTO: 1.66 K/UL
LYMPHOCYTES NFR BLD AUTO: 28.5 %
MAN DIFF?: NORMAL
MCHC RBC-ENTMCNC: 28.5 PG
MCHC RBC-ENTMCNC: 32.9 G/DL
MCV RBC AUTO: 86.6 FL
MONOCYTES # BLD AUTO: 0.44 K/UL
MONOCYTES NFR BLD AUTO: 7.5 %
NEUTROPHILS # BLD AUTO: 3.54 K/UL
NEUTROPHILS NFR BLD AUTO: 60.8 %
NONHDLC SERPL-MCNC: 110 MG/DL
PLATELET # BLD AUTO: 140 K/UL
POTASSIUM SERPL-SCNC: 4.5 MMOL/L
PROT SERPL-MCNC: 7.6 G/DL
RBC # BLD: 5.23 M/UL
RBC # FLD: 13.2 %
SODIUM SERPL-SCNC: 138 MMOL/L
TRIGL SERPL-MCNC: 240 MG/DL
WBC # FLD AUTO: 5.83 K/UL

## 2021-08-10 ENCOUNTER — APPOINTMENT (OUTPATIENT)
Dept: CARDIOLOGY | Facility: CLINIC | Age: 51
End: 2021-08-10
Payer: COMMERCIAL

## 2021-08-10 VITALS — HEART RATE: 78 BPM | DIASTOLIC BLOOD PRESSURE: 79 MMHG | TEMPERATURE: 97.9 F | SYSTOLIC BLOOD PRESSURE: 118 MMHG

## 2021-08-10 PROCEDURE — 99214 OFFICE O/P EST MOD 30 MIN: CPT

## 2021-08-10 PROCEDURE — 93000 ELECTROCARDIOGRAM COMPLETE: CPT

## 2021-08-10 NOTE — DISCUSSION/SUMMARY
[Patient] : the patient [Minutes Spent: ___] : for [unfilled] ~Uminutes [FreeTextEntry1] : C/w ASA, Palvix\par Started rehab.\par C/w Crestor 20 mg. C/w Vascepa and increase the dose to 3 a day, as his TG are still elevated. Check Labs in 6 months.\par Results of 2D echo noted. No need for AICD at tis time.\par C/w BB - changed to succinate\par Low BP - unable to start Entresto or ARB. \par \par GI follow-up. 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 C/w CPAP for now.\par F/u in 6 months.

## 2021-08-10 NOTE — HISTORY OF PRESENT ILLNESS
[FreeTextEntry1] : 49 yo M with HIV on ART, DM (HgA1C 6.9), DLD, had ACS in 2019, cardiac Cath c/w with multivessel disease, reduced EF. Patient underwent CABG x 4 (LIMA- LAD, SVG- OM1, SVG- OM2, radial- PDA) on 9/10/19. Patient tolerated procedure well, and had no issues coming off bypass.  Post op EF improved to 35% from 25% preop. Repeat echo - EF 45-50%.\par \par + bloating. His LFTs were elevated, and his Crestor was stopped. His TG went up. LFT's actually increased despite stopping Crestor. he has severe fatty liver disease. Restarted Crestor - Labs reviewed today - actually better. He reports his CPAP was recalled - awaiting appointment with Dr. Harris.

## 2021-08-10 NOTE — ASSESSMENT
[FreeTextEntry1] : CAD, s/p CABG for multivessel disease\par DM\par DL\par Normal BP.\par LFT's reviewed\par Cath report, CABG report, echo, hospital notes reviewed.

## 2021-11-14 ENCOUNTER — RX RENEWAL (OUTPATIENT)
Age: 51
End: 2021-11-14

## 2021-12-10 ENCOUNTER — RX RENEWAL (OUTPATIENT)
Age: 51
End: 2021-12-10

## 2022-02-07 ENCOUNTER — LABORATORY RESULT (OUTPATIENT)
Age: 52
End: 2022-02-07

## 2022-02-08 ENCOUNTER — APPOINTMENT (OUTPATIENT)
Dept: CARDIOLOGY | Facility: CLINIC | Age: 52
End: 2022-02-08
Payer: COMMERCIAL

## 2022-02-08 VITALS
SYSTOLIC BLOOD PRESSURE: 113 MMHG | BODY MASS INDEX: 29.62 KG/M2 | TEMPERATURE: 97.9 F | HEART RATE: 84 BPM | HEIGHT: 69 IN | WEIGHT: 200 LBS | DIASTOLIC BLOOD PRESSURE: 78 MMHG

## 2022-02-08 DIAGNOSIS — Z87.19 PERSONAL HISTORY OF OTHER DISEASES OF THE DIGESTIVE SYSTEM: ICD-10-CM

## 2022-02-08 DIAGNOSIS — K62.5 HEMORRHAGE OF ANUS AND RECTUM: ICD-10-CM

## 2022-02-08 PROCEDURE — 93000 ELECTROCARDIOGRAM COMPLETE: CPT

## 2022-02-08 PROCEDURE — 99214 OFFICE O/P EST MOD 30 MIN: CPT

## 2022-02-08 NOTE — DISCUSSION/SUMMARY
[Patient] : the patient [Minutes Spent: ___] : for [unfilled] ~Uminutes [FreeTextEntry1] : C/w ASA, Palvix\par Get Nuclear stress, given the recurrent symptoms, similar in quality to his angina.\par C/w Crestor 20 mg. C/w Vascepa and increase the dose to 4 a day, as his TG are still elevated. Check Labs in 6 months.\par Results of 2D echo noted. No need for AICD at tis time.\par C/w BB - changed to succinate\par Low BP - unable to start Entresto or ARB. \par Palpitations - get event monitor.\par \par GI follow-up. 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 C/w CPAP for now.\par F/u in 6 months.

## 2022-02-08 NOTE — HISTORY OF PRESENT ILLNESS
[FreeTextEntry1] : 52 yo M with HIV on ART, DM (HgA1C 6.9), DLD, had ACS in 2019, cardiac Cath c/w with multivessel disease, reduced EF. Patient underwent CABG x 4 (LIMA- LAD, SVG- OM1, SVG- OM2, radial- PDA) on 9/10/19. Patient tolerated procedure well, and had no issues coming off bypass.  Post op EF improved to 35% from 25% preop. Repeat echo - EF 45-50%.\par \par + bloating. His LFTs were elevated, and his Crestor was stopped. His TG went up. LFT's actually increased despite stopping Crestor. he has severe fatty liver disease. Restarted Crestor - Labs reviewed today - actually better. He reports his CPAP was recalled - got replacement. He reports episode of palpitations. Also has neck pain, similar to his MI.

## 2022-03-04 ENCOUNTER — OUTPATIENT (OUTPATIENT)
Dept: OUTPATIENT SERVICES | Facility: HOSPITAL | Age: 52
LOS: 1 days | Discharge: HOME | End: 2022-03-04
Payer: COMMERCIAL

## 2022-03-04 ENCOUNTER — RESULT REVIEW (OUTPATIENT)
Age: 52
End: 2022-03-04

## 2022-03-04 DIAGNOSIS — Z90.49 ACQUIRED ABSENCE OF OTHER SPECIFIED PARTS OF DIGESTIVE TRACT: Chronic | ICD-10-CM

## 2022-03-04 DIAGNOSIS — I25.10 ATHEROSCLEROTIC HEART DISEASE OF NATIVE CORONARY ARTERY WITHOUT ANGINA PECTORIS: ICD-10-CM

## 2022-03-04 PROCEDURE — 78452 HT MUSCLE IMAGE SPECT MULT: CPT | Mod: 26

## 2022-03-14 ENCOUNTER — RX RENEWAL (OUTPATIENT)
Age: 52
End: 2022-03-14

## 2022-03-29 ENCOUNTER — APPOINTMENT (OUTPATIENT)
Dept: CARDIOLOGY | Facility: CLINIC | Age: 52
End: 2022-03-29
Payer: COMMERCIAL

## 2022-03-29 VITALS
HEART RATE: 79 BPM | SYSTOLIC BLOOD PRESSURE: 130 MMHG | HEIGHT: 69 IN | TEMPERATURE: 98 F | RESPIRATION RATE: 16 BRPM | DIASTOLIC BLOOD PRESSURE: 75 MMHG | WEIGHT: 200 LBS | BODY MASS INDEX: 29.62 KG/M2

## 2022-03-29 PROCEDURE — 93000 ELECTROCARDIOGRAM COMPLETE: CPT

## 2022-03-29 PROCEDURE — 99214 OFFICE O/P EST MOD 30 MIN: CPT

## 2022-03-29 RX ORDER — ASPIRIN 81 MG/1
81 TABLET ORAL DAILY
Qty: 90 | Refills: 3 | Status: COMPLETED | COMMUNITY
Start: 2020-11-11 | End: 2022-03-29

## 2022-03-29 NOTE — DISCUSSION/SUMMARY
[Patient] : the patient [Minutes Spent: ___] : for [unfilled] ~Uminutes [FreeTextEntry1] : C/w ASA, Palvix\par Results of Nuclear stress discussed.\par C/w Crestor 20 mg. C/w Vascepa and increase the dose to 4 a day, as his TG are still elevated. Check Labs in 6 months. F/u with endocrine\par Results of 2D echo noted. No need for AICD at tis time.\par C/w BB - changed to succinate\par Start ARB. \par Palpitations - PVC on event monitor. C/w BB\par \par GI follow-up. 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 C/w CPAP for now.\par F/u in 6 months.

## 2022-04-11 ENCOUNTER — RX RENEWAL (OUTPATIENT)
Age: 52
End: 2022-04-11

## 2022-07-05 ENCOUNTER — RX RENEWAL (OUTPATIENT)
Age: 52
End: 2022-07-05

## 2022-07-13 NOTE — DISCHARGE NOTE PROVIDER - NSDCDCMDCOMP_GEN_ALL_CORE
This document is complete and the patient is ready for discharge. Drysol Counseling:  I discussed with the patient the risks of drysol/aluminum chloride including but not limited to skin rash, itching, irritation, burning.

## 2022-08-03 ENCOUNTER — RX RENEWAL (OUTPATIENT)
Age: 52
End: 2022-08-03

## 2022-08-08 NOTE — ED PROVIDER NOTE - ATTENDING CONTRIBUTION TO CARE
Yes
49 yo male with PMH DM, HIV on HARRT with reported high CD4 and low viral load presented c/o left sided neck pain and swelling x several days. Pt states pain was occurring and went to his dentist who filled a small cavity in his left back molar but pain has continued and now with fever x 2 days with T max 105. Denied any HA, dizziness, trismus, drooling or voice changes. No difficulty swallowing. Denied any CP, SOB or palpitations. No trauma or falls. Denied cough, URI sx, urinary complaints, abdominal or flank pain.     VITAL SIGNS: noted  CONSTITUTIONAL: Well-developed; well-nourished; in no acute distress  HEAD: Normocephalic; atraumatic  EYES: PERRL, EOM intact; conjunctiva and sclera clear  ENT: No nasal discharge; airway clear. MMM, oropharynx without erythema or exudates  NECK: Supple; + exquisite tenderness and swelling along left submental area extending up along jaw, no cellulitis or increased warmth, no dental abscess noted  CARD: S1, S2 normal; no murmurs, gallops, or rubs. Regular rate and rhythm  RESP: CTAB/L, no wheezes, rales or rhonchi  ABD: Normal bowel sounds; soft; non-distended; non-tender; no CVA tenderness  EXT: Normal ROM. No calf tenderness or edema. Distal pulses intact  NEURO: Alert, oriented. Grossly unremarkable. No focal deficits  SKIN: Skin exam is warm and dry  MS: No midline spinal tenderness

## 2022-09-19 ENCOUNTER — RX RENEWAL (OUTPATIENT)
Age: 52
End: 2022-09-19

## 2022-09-27 ENCOUNTER — APPOINTMENT (OUTPATIENT)
Dept: CARDIOLOGY | Facility: CLINIC | Age: 52
End: 2022-09-27

## 2022-09-27 VITALS
SYSTOLIC BLOOD PRESSURE: 110 MMHG | WEIGHT: 195 LBS | HEART RATE: 86 BPM | DIASTOLIC BLOOD PRESSURE: 78 MMHG | TEMPERATURE: 97.8 F | RESPIRATION RATE: 17 BRPM | BODY MASS INDEX: 29.55 KG/M2 | HEIGHT: 68 IN

## 2022-09-27 PROCEDURE — 99214 OFFICE O/P EST MOD 30 MIN: CPT | Mod: 25

## 2022-09-27 PROCEDURE — 93000 ELECTROCARDIOGRAM COMPLETE: CPT

## 2022-09-27 NOTE — DISCUSSION/SUMMARY
[Patient] : the patient [Minutes Spent: ___] : for [unfilled] ~Uminutes [FreeTextEntry1] : C/w ASA, Palvix\par Results of Nuclear stress discussed.\par C/w Crestor 20 mg. C/w Vascepa and increase the dose to 4 a day, as his TG are still elevated. Check Labs in 6 months. F/u with endocrine\par Nuclear is negative\par Results of 2D echo noted. No need for AICD at tis time.\par C/w BB - changed to succinate\par Start ARB. \par Palpitations - PVC on event monitor. C/w BB\par Repeat lasb before next visit.\par GI follow-up. 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 C/w CPAP for now.\par F/u in 6 months.

## 2022-09-27 NOTE — ASSESSMENT
[FreeTextEntry1] : CAD, s/p CABG for multivessel disease\par DM\par DL\par Normal BP.\par LFT's reviewed\par Cath report, CABG report, echo, hospital notes reviewed.\par Nuclear reviewed, discussed with the patient.

## 2022-09-27 NOTE — HISTORY OF PRESENT ILLNESS
[FreeTextEntry1] : 52 yo M with HIV on ART, DM (HgA1C 6.9), DLD, had ACS in 2019, cardiac Cath c/w with multivessel disease, reduced EF. Patient underwent CABG x 4 (LIMA- LAD, SVG- OM1, SVG- OM2, radial- PDA) on 9/10/19. Patient tolerated procedure well, and had no issues coming off bypass.  Post op EF improved to 35% from 25% preop. Repeat echo - EF 45-50%.\par \par + bloating. His LFTs were elevated, and his Crestor was stopped. His TG went up. LFT's actually increased despite stopping Crestor. he has severe fatty liver disease. Restarted Crestor - Labs reviewed today - actually better. He reports his CPAP was recalled - got replacement. He reports episode of palpitations.  Nuclear 3/4/22 - negative for ischemia, fixed defect.

## 2022-10-21 ENCOUNTER — RX RENEWAL (OUTPATIENT)
Age: 52
End: 2022-10-21

## 2022-11-29 ENCOUNTER — APPOINTMENT (OUTPATIENT)
Dept: ORTHOPEDIC SURGERY | Facility: CLINIC | Age: 52
End: 2022-11-29

## 2022-12-06 ENCOUNTER — APPOINTMENT (OUTPATIENT)
Dept: ORTHOPEDIC SURGERY | Facility: CLINIC | Age: 52
End: 2022-12-06
Payer: COMMERCIAL

## 2022-12-06 DIAGNOSIS — M25.511 PAIN IN RIGHT SHOULDER: ICD-10-CM

## 2022-12-06 PROCEDURE — 99203 OFFICE O/P NEW LOW 30 MIN: CPT

## 2022-12-12 ENCOUNTER — APPOINTMENT (OUTPATIENT)
Dept: MRI IMAGING | Facility: CLINIC | Age: 52
End: 2022-12-12
Payer: COMMERCIAL

## 2022-12-12 PROCEDURE — 73221 MRI JOINT UPR EXTREM W/O DYE: CPT | Mod: RT

## 2023-01-02 NOTE — HISTORY OF PRESENT ILLNESS
[de-identified] : Patient is here for evaluation of right shoulder pain\par Affecting quality of life\par Has pain and weakness with loss of rom\par Wakes up at night due to pain\par \par NAD\par Right shoulder:\par TTP ant GH joint, bicipital groove\par FF 0-140 (passive 175)\par ER 40\par IR L5\par Pain with terminal rom\par Weakness to abduction and ER\par Pos Impingement\par Pos Preciado\par Pos Cross Arm Adduction\par Negative instability\par Positive scapula dyskinesia\par \par XRay right shoulder negative for fracture, dislocation, arthritis\par \par Plan\par went over findings\par explained the xray\par needs an mri rigth shoulder to evaluate RC\par fu after imaging\par all questions answered

## 2023-01-18 ENCOUNTER — APPOINTMENT (OUTPATIENT)
Age: 53
End: 2023-01-18
Payer: COMMERCIAL

## 2023-01-18 VITALS
RESPIRATION RATE: 14 BRPM | SYSTOLIC BLOOD PRESSURE: 130 MMHG | BODY MASS INDEX: 30.92 KG/M2 | WEIGHT: 204 LBS | HEART RATE: 88 BPM | DIASTOLIC BLOOD PRESSURE: 80 MMHG | HEIGHT: 68 IN | OXYGEN SATURATION: 97 %

## 2023-01-18 DIAGNOSIS — R05.9 COUGH, UNSPECIFIED: ICD-10-CM

## 2023-01-18 PROCEDURE — 99214 OFFICE O/P EST MOD 30 MIN: CPT

## 2023-01-18 NOTE — PHYSICAL EXAM
[No Acute Distress] : no acute distress [Normal Oropharynx] : normal oropharynx [Normal Appearance] : normal appearance [No Neck Mass] : no neck mass [Normal Rate/Rhythm] : normal rate/rhythm [Normal S1, S2] : normal s1, s2 [No Murmurs] : no murmurs [No Abnormalities] : no abnormalities [Benign] : benign [Normal Gait] : normal gait [No Clubbing] : no clubbing [No Cyanosis] : no cyanosis [No Edema] : no edema [FROM] : FROM [Normal Color/ Pigmentation] : normal color/ pigmentation [No Focal Deficits] : no focal deficits [Oriented x3] : oriented x3 [Normal Affect] : normal affect [TextBox_68] : DEC BELEM [place]CHRAN BASE

## 2023-01-18 NOTE — DISCUSSION/SUMMARY
[FreeTextEntry1] : KALYANI COUNSELED ABOUT COMPLIANCE\par LLL ATELECTASIS/ CXR\par PFT\par WEIGHT LOSS

## 2023-01-29 LAB
ALBUMIN SERPL ELPH-MCNC: 4.4 G/DL
ALP BLD-CCNC: 67 U/L
ALT SERPL-CCNC: 74 U/L
ANION GAP SERPL CALC-SCNC: 11 MMOL/L
AST SERPL-CCNC: 43 U/L
BASOPHILS # BLD AUTO: 0.03 K/UL
BASOPHILS NFR BLD AUTO: 0.7 %
BILIRUB SERPL-MCNC: 0.4 MG/DL
BUN SERPL-MCNC: 13 MG/DL
CALCIUM SERPL-MCNC: 9.3 MG/DL
CHLORIDE SERPL-SCNC: 103 MMOL/L
CHOLEST SERPL-MCNC: 157 MG/DL
CO2 SERPL-SCNC: 25 MMOL/L
CREAT SERPL-MCNC: 0.86 MG/DL
EGFR: 105 ML/MIN/1.73M2
EOSINOPHIL # BLD AUTO: 0.1 K/UL
EOSINOPHIL NFR BLD AUTO: 2.2 %
ESTIMATED AVERAGE GLUCOSE: 171 MG/DL
GLUCOSE SERPL-MCNC: 160 MG/DL
HBA1C MFR BLD HPLC: 7.6 %
HCT VFR BLD CALC: 47.8 %
HDLC SERPL-MCNC: 35 MG/DL
HGB BLD-MCNC: 15.6 G/DL
IMM GRANULOCYTES NFR BLD AUTO: 0.4 %
LDLC SERPL CALC-MCNC: 90 MG/DL
LYMPHOCYTES # BLD AUTO: 1.59 K/UL
LYMPHOCYTES NFR BLD AUTO: 34.6 %
MAN DIFF?: NORMAL
MCHC RBC-ENTMCNC: 28.6 PG
MCHC RBC-ENTMCNC: 32.6 GM/DL
MCV RBC AUTO: 87.5 FL
MONOCYTES # BLD AUTO: 0.37 K/UL
MONOCYTES NFR BLD AUTO: 8 %
NEUTROPHILS # BLD AUTO: 2.49 K/UL
NEUTROPHILS NFR BLD AUTO: 54.1 %
NONHDLC SERPL-MCNC: 122 MG/DL
PLATELET # BLD AUTO: 156 K/UL
POTASSIUM SERPL-SCNC: 4.7 MMOL/L
PROT SERPL-MCNC: 6.9 G/DL
RBC # BLD: 5.46 M/UL
RBC # FLD: 12.8 %
SODIUM SERPL-SCNC: 138 MMOL/L
TRIGL SERPL-MCNC: 163 MG/DL
WBC # FLD AUTO: 4.6 K/UL

## 2023-02-07 NOTE — ASU PATIENT PROFILE, ADULT - FALL HARM RISK TYPE OF ASSESSMENT
[facility]    Kiara Rodriguez is a 62 y.o. male, here for evaluation of the following concerns:    Patient recently visited the ED on 02/01 for insomnia. Per the patient, he could not sleep 2 weeks prior to that visit. He was prescribed Hydroxyzine 25 mg nightly that he has been taking every night since then. He did not have disturbances in his sleep since then. Patient said that he has been under a lot of stress recently. His sister had passed away recently and he also was diagnosed with glaucoma (left sided visual deficits). He said that he is anxious about his left eye and regularly see the ophthalmologist.   .    Review of Systems   Constitutional:  Negative for fatigue, fever and unexpected weight change. HENT:  Negative for congestion and hearing loss. Eyes:  Positive for visual disturbance (decreased vision in his left eye). Negative for photophobia. Respiratory:  Negative for cough, choking, shortness of breath and wheezing. Cardiovascular:  Negative for chest pain, palpitations and leg swelling. Gastrointestinal:  Negative for abdominal pain, constipation, diarrhea, nausea and vomiting. Endocrine: Negative for cold intolerance and heat intolerance. Genitourinary:  Negative for difficulty urinating and dysuria. Musculoskeletal:  Negative for arthralgias and back pain. Neurological:  Negative for dizziness, light-headedness, numbness and headaches. Psychiatric/Behavioral:  Negative for agitation, confusion, decreased concentration and hallucinations. MEDICATIONS:  Prior to Visit Medications    Medication Sig Taking?  Authorizing Provider   lisinopril (PRINIVIL;ZESTRIL) 10 MG tablet Take 10 mg by mouth daily Yes Historical Provider, MD   amLODIPine (NORVASC) 10 MG tablet Take 10 mg by mouth daily Yes Historical Provider, MD   doxazosin (CARDURA) 2 MG tablet Take 2 mg by mouth nightly Yes Historical Provider, MD   timolol (TIMOPTIC) 0.5 % ophthalmic solution Place 1 drop into both eyes 2 times daily Yes Historical Provider, MD   brimonidine (ALPHAGAN) 0.2 % ophthalmic solution Place 1 drop into the left eye 3 times daily Yes Historical Provider, MD   dorzolamide (TRUSOPT) 2 % ophthalmic solution Place 2 drops into the left eye 3 times daily Yes Historical Provider, MD   hydrOXYzine HCl (ATARAX) 25 MG tablet Take 1 tablet by mouth nightly as needed (insomnia) Yes Abigail Walls MD        Vitals:    02/07/23 1523 02/07/23 1529 02/07/23 1530   BP: (!) 154/80 (!) 148/78 115/78  Comment: with his bp reader   Site: Right Upper Arm     Position: Sitting     Cuff Size: Large Adult     Pulse: 78     Resp: 16     Temp: 98.8 °F (37.1 °C)     TempSrc: Temporal     SpO2: 98%     Weight: 194 lb 1.6 oz (88 kg)        Estimated body mass index is 25.61 kg/m² as calculated from the following:    Height as of 2/1/23: 6' 1\" (1.854 m). Weight as of this encounter: 194 lb 1.6 oz (88 kg). Physical Exam  Constitutional:       Appearance: Normal appearance. HENT:      Head: Normocephalic and atraumatic. Eyes:      Extraocular Movements: Extraocular movements intact. Conjunctiva/sclera:      Left eye: Left conjunctiva is injected. Visual Fields: Right eye visual fields normal.      Comments: Left eye is blurry with decrease vision   Cardiovascular:      Rate and Rhythm: Normal rate and regular rhythm. Pulses: Normal pulses. Heart sounds: Normal heart sounds. Pulmonary:      Effort: Pulmonary effort is normal.      Breath sounds: Normal breath sounds. Abdominal:      General: Abdomen is flat. Bowel sounds are normal.      Palpations: Abdomen is soft. Musculoskeletal:         General: Normal range of motion. Skin:     General: Skin is warm. Capillary Refill: Capillary refill takes less than 2 seconds. Neurological:      General: No focal deficit present. Mental Status: He is alert and oriented to person, place, and time. Psychiatric:         Mood and Affect: Mood normal.         Behavior: Behavior normal.         Thought Content: Thought content normal.       ASSESSMENT/PLAN:     1. Situational anxiety   Patient has been under a lot of stress recently due to his new glaucoma diagnosis and his sister passing away. - Continue Hydroxyzine 25 mg PRN for sleep. Asked patient to try and wean it off  - Will continue to monitor patient for now but will consider starting SSRI if no improvement     2. Hyperglycemia   Blood glucose 211 in the ED.  - POCT glycosylated hemoglobin (Hb A1C) 5.6%  - Patient was instructed on healthy eating     3. Essential hypertension  BP today 154/80 and on repeat 140/90. Patient's BP cuff 115/78. Compliant with home meds. - Instructed patient to get a new BP cuff  - Continue home Lisinopril 10 mg daily and Amlodipine 10 mg daily  - DASH diet and regular exercise recommended to the patient  - Will consider medications adjustment if BP is still high in our next appointment    4. Glaucoma of left eye, unspecified glaucoma type  - Follows with an Ophthalmologist regularly. Asked the patient to bring a note from his ophthalmologist  - Continue management per ophthalmologist: Timolol, Brimonidine, Dorzolamide ophthalmic solutions      Return in about 3 months (around 5/7/2023). The patient was staffed with the teaching attending: Dr. Elzbieta Kaiser. An electronic signature was used to authenticate this note.     --Jason Chapman MD Admission

## 2023-02-28 ENCOUNTER — APPOINTMENT (OUTPATIENT)
Dept: CARDIOLOGY | Facility: CLINIC | Age: 53
End: 2023-02-28
Payer: COMMERCIAL

## 2023-02-28 VITALS
BODY MASS INDEX: 30.01 KG/M2 | RESPIRATION RATE: 14 BRPM | DIASTOLIC BLOOD PRESSURE: 79 MMHG | HEIGHT: 68 IN | WEIGHT: 198 LBS | SYSTOLIC BLOOD PRESSURE: 119 MMHG | TEMPERATURE: 97 F | HEART RATE: 83 BPM

## 2023-02-28 PROCEDURE — 93000 ELECTROCARDIOGRAM COMPLETE: CPT

## 2023-02-28 PROCEDURE — 99214 OFFICE O/P EST MOD 30 MIN: CPT | Mod: 25

## 2023-02-28 NOTE — DISCUSSION/SUMMARY
[Patient] : the patient [Minutes Spent: ___] : for [unfilled] ~Uminutes [FreeTextEntry1] : C/w ASA, Palvix\par Results of Nuclear stress discussed.\par STill with chest discomfort - will get a stress echo to assess\par C/w Crestor 20 mg. C/w Vascepa  Check Labs with endocrine and PMD\par Results of 2D echo noted. No need for AICD at tis time. Will f/u repeat test\par C/w BB - changed to succinate\par Started ARB. \par Palpitations - PVC on event monitor. C/w BB\par Repeat lasb before next visit.\par GI follow-up. 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 C/w CPAP for now.\par F/u in after the test

## 2023-02-28 NOTE — HISTORY OF PRESENT ILLNESS
[FreeTextEntry1] : 51 yo M with HIV on ART, DM (HgA1C 6.9), DLD, had ACS in 2019, cardiac Cath c/w with multivessel disease, reduced EF. Patient underwent CABG x 4 (LIMA- LAD, SVG- OM1, SVG- OM2, radial- PDA) on 9/10/19. Patient tolerated procedure well, and had no issues coming off bypass.  Post op EF improved to 35% from 25% preop. Repeat echo - EF 45-50%. Nuclear 3/22 - predominantly fixed defect with some kalin-infarct ischemia, EF 40%.\par \par Restarted Crestor - Labs reviewed today - actually better. He reports his CPAP was recalled - got replacement. He reports episode of atypical chest discomfort.

## 2023-03-17 ENCOUNTER — APPOINTMENT (OUTPATIENT)
Dept: CARDIOLOGY | Facility: CLINIC | Age: 53
End: 2023-03-17

## 2023-03-27 ENCOUNTER — APPOINTMENT (OUTPATIENT)
Dept: CARDIOLOGY | Facility: CLINIC | Age: 53
End: 2023-03-27
Payer: COMMERCIAL

## 2023-03-27 PROCEDURE — ZZZZZ: CPT

## 2023-03-27 PROCEDURE — 93325 DOPPLER ECHO COLOR FLOW MAPG: CPT

## 2023-03-27 PROCEDURE — 99214 OFFICE O/P EST MOD 30 MIN: CPT | Mod: 25

## 2023-03-27 PROCEDURE — 93320 DOPPLER ECHO COMPLETE: CPT

## 2023-03-27 PROCEDURE — 93351 STRESS TTE COMPLETE: CPT

## 2023-03-27 NOTE — DISCUSSION/SUMMARY
[Patient] : the patient [Minutes Spent: ___] : for [unfilled] ~Uminutes [FreeTextEntry1] : C/w ASA, Palvix\par Results of stress echo (abnormal, but similar findings to prior Nuclear stress) discussed. Options of cath vs. medical therapy discussed. Given good exertional tolerance, similar abnormalities on the stress would manage medically at this time.\par C/w Crestor 20 mg. C/w Vascepa  Check Labs with endocrine and PMD\par Results of 2D echo noted - EF improved. No need for AICD at tis time. \par C/w BB - changed to succinate\par Started ARB. \par Palpitations - PVC on event monitor. C/w BB\par Repeat lasb before next visit.\par GI follow-up. 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 C/w CPAP for now.\par F/u in 6 months or if any symptoms - immediately

## 2023-06-01 ENCOUNTER — APPOINTMENT (OUTPATIENT)
Dept: ENDOCRINOLOGY | Facility: CLINIC | Age: 53
End: 2023-06-01
Payer: COMMERCIAL

## 2023-06-01 VITALS
HEIGHT: 68 IN | BODY MASS INDEX: 29.55 KG/M2 | TEMPERATURE: 97.4 F | WEIGHT: 195 LBS | SYSTOLIC BLOOD PRESSURE: 128 MMHG | HEART RATE: 80 BPM | OXYGEN SATURATION: 98 % | DIASTOLIC BLOOD PRESSURE: 80 MMHG

## 2023-06-01 DIAGNOSIS — Z83.3 FAMILY HISTORY OF DIABETES MELLITUS: ICD-10-CM

## 2023-06-01 PROCEDURE — 99204 OFFICE O/P NEW MOD 45 MIN: CPT

## 2023-06-01 NOTE — PHYSICAL EXAM
[Alert] : alert [Healthy Appearance] : healthy appearance [No Acute Distress] : no acute distress [No Proptosis] : no proptosis [No Lid Lag] : no lid lag [Thyroid Not Enlarged] : the thyroid was not enlarged [No Thyroid Nodules] : no palpable thyroid nodules [No Respiratory Distress] : no respiratory distress [No Accessory Muscle Use] : no accessory muscle use [Clear to Auscultation] : lungs were clear to auscultation bilaterally [No Murmurs] : no murmurs [Regular Rhythm] : with a regular rhythm [No Edema] : no peripheral edema [Not Tender] : non-tender [Not Distended] : not distended [Soft] : abdomen soft [No CVA Tenderness] : no ~M costovertebral angle tenderness [No Stigmata of Cushings Syndrome] : no stigmata of Cushings Syndrome [Abdominal Striae] : no abdominal striae [Acanthosis Nigricans] : no acanthosis nigricans [Hirsutism] : no hirsutism [No Tremors] : no tremors [Oriented x3] : oriented to person, place, and time [de-identified] : left red eye

## 2023-06-01 NOTE — REVIEW OF SYSTEMS
[Fatigue] : no fatigue [Recent Weight Gain (___ Lbs)] : recent weight gain: [unfilled] lbs [Recent Weight Loss (___ Lbs)] : no recent weight loss [Visual Field Defect] : no visual field defect [Blurred Vision] : no blurred vision [Dysphagia] : no dysphagia [Neck Pain] : no neck pain [Dysphonia] : no dysphonia [Chest Pain] : no chest pain [Palpitations] : no palpitations [Fast Heart Rate] : heart rate is not fast [Lower Ext Edema] : no lower extremity edema [Shortness Of Breath] : no shortness of breath [SOB on Exertion] : no shortness of breath on exertion [Nausea] : no nausea [Vomiting] : no vomiting [Polyuria] : polyuria [As Noted in HPI] : as noted in HPI [Acanthosis] : acanthosis [Pain/Numbness of Digits] : no pain/numbness of digits [Cold Intolerance] : no cold intolerance [Heat Intolerance] : no heat intolerance

## 2023-06-01 NOTE — HISTORY OF PRESENT ILLNESS
[FreeTextEntry1] : Mr. BRISEIDA SIMON  Is  a 52 year  old male with HIV  on ART ,, DL ,  type 2 DM  , fatty liver CAD  s/p CABG ( 2019 ) CHF , sleep apnea  who presented for evaluation of type 2 Diabetes:\par \par \par \par Diagnosis: 5 years ago \par Current Regimen:farxiga 5 mg daily   , janumet 50/1000 mg daily \par Previous regimens: none \par Compliance:  good\par SMBG/CGM :  150 \par Hypoglycemia:no \par Polyuria/polydipsia :yes \par Weight change/BMI: stable 195-200 \par Diet: carb consistent \par Exercise: active at work \par HBa1c trend: 7.6%\par \par \par .prevention  \par Statin: rosuvastatin 10 mg daily , vascepa 2 g BID \par ACE/ARB :losartan 25 mg daily \par Eye examination: not for 2 years now \par Neuropathy: no \par \par

## 2023-06-01 NOTE — ASSESSMENT
[Diabetes Foot Care] : diabetes foot care [Long Term Vascular Complications] : long term vascular complications of diabetes [Carbohydrate Consistent Diet] : carbohydrate consistent diet [Importance of Diet and Exercise] : importance of diet and exercise to improve glycemic control, achieve weight loss and improve cardiovascular health [Exercise/Effect on Glucose] : exercise/effect on glucose [Self Monitoring of Blood Glucose] : self monitoring of blood glucose [Injection Technique, Storage, Sharps Disposal] : injection technique, storage, and sharps disposal [Retinopathy Screening] : Patient was referred to ophthalmology for retinopathy screening [FreeTextEntry1] : Mr. BRISEIDA SIMON  Is  a 52 year  old male with HIV  on ART ,, DL ,  type 2 DM  , fatty liver CAD  s/p CABG ( 2019 ) CHF , sleep apnea  who presented for evaluation of type 2 Diabetes:\par \par \par \par # uncontrolled type  2 DM , DL, overweight / fatty liver \par - A1c 7.6%( 2/2023) on farxiga 5 mg daily   , janumet 50/1000 mg daily \par - having recurrent yeast infection and likely from farxiga \par - STOP farxiga and janumet \par -  start Ozempic 0.25 mg Q week and increase to 0.5 mg Q week if tolerated , reviewed injection technique \par reviewed possible side effects and contraindications as well as close monitoring of viral load given he is on HIV meds (   avoid med absorption problem with GLP1 agonist \par - start metformin 1000 mg daily , will up if tolerated \par - continue  rosuvastatin 10 mg daily , vascepa 2 g BID \par - continue  :losartan 25 mg daily \par - refer for dilated eye exam \par - podiatry follow up

## 2023-07-14 ENCOUNTER — OUTPATIENT (OUTPATIENT)
Dept: OUTPATIENT SERVICES | Facility: HOSPITAL | Age: 53
LOS: 1 days | End: 2023-07-14
Payer: COMMERCIAL

## 2023-07-14 ENCOUNTER — RESULT REVIEW (OUTPATIENT)
Age: 53
End: 2023-07-14

## 2023-07-14 DIAGNOSIS — Z90.49 ACQUIRED ABSENCE OF OTHER SPECIFIED PARTS OF DIGESTIVE TRACT: Chronic | ICD-10-CM

## 2023-07-14 DIAGNOSIS — R06.02 SHORTNESS OF BREATH: ICD-10-CM

## 2023-07-14 PROCEDURE — 71046 X-RAY EXAM CHEST 2 VIEWS: CPT

## 2023-07-14 PROCEDURE — 71046 X-RAY EXAM CHEST 2 VIEWS: CPT | Mod: 26

## 2023-07-15 DIAGNOSIS — R06.02 SHORTNESS OF BREATH: ICD-10-CM

## 2023-07-18 ENCOUNTER — APPOINTMENT (OUTPATIENT)
Dept: PULMONOLOGY | Facility: CLINIC | Age: 53
End: 2023-07-18
Payer: COMMERCIAL

## 2023-07-18 VITALS
DIASTOLIC BLOOD PRESSURE: 80 MMHG | RESPIRATION RATE: 14 BRPM | SYSTOLIC BLOOD PRESSURE: 120 MMHG | WEIGHT: 196 LBS | HEART RATE: 95 BPM | OXYGEN SATURATION: 99 % | BODY MASS INDEX: 29.7 KG/M2 | HEIGHT: 68 IN

## 2023-07-18 DIAGNOSIS — J98.11 ATELECTASIS: ICD-10-CM

## 2023-07-18 PROCEDURE — 99213 OFFICE O/P EST LOW 20 MIN: CPT

## 2023-07-18 NOTE — HISTORY OF PRESENT ILLNESS
[TextBox_4] : SP CXR LINGULAR OPACITY\par EX SMOKER STOPPED SMOKING 4 Y AGO SMOKED MORE THAN 20 PACK/ Y\par SOB ON EXERTION

## 2023-07-18 NOTE — DISCUSSION/SUMMARY
[FreeTextEntry1] : [facility]ALY[facility] COUNSELED ABOUT COMPLIANCE\par LLL OPACITY/ EX SMOKER/ CHEST CT\par PFT\par WEIGHT LOSS

## 2023-07-30 ENCOUNTER — OUTPATIENT (OUTPATIENT)
Dept: OUTPATIENT SERVICES | Facility: HOSPITAL | Age: 53
LOS: 1 days | End: 2023-07-30
Payer: COMMERCIAL

## 2023-07-30 ENCOUNTER — RESULT REVIEW (OUTPATIENT)
Age: 53
End: 2023-07-30

## 2023-07-30 DIAGNOSIS — Z90.49 ACQUIRED ABSENCE OF OTHER SPECIFIED PARTS OF DIGESTIVE TRACT: Chronic | ICD-10-CM

## 2023-07-30 DIAGNOSIS — J98.11 ATELECTASIS: ICD-10-CM

## 2023-07-30 PROCEDURE — 71250 CT THORAX DX C-: CPT | Mod: 26

## 2023-07-30 PROCEDURE — 71250 CT THORAX DX C-: CPT

## 2023-07-31 DIAGNOSIS — J98.11 ATELECTASIS: ICD-10-CM

## 2023-08-22 ENCOUNTER — APPOINTMENT (OUTPATIENT)
Dept: CARDIOLOGY | Facility: CLINIC | Age: 53
End: 2023-08-22
Payer: COMMERCIAL

## 2023-08-22 VITALS
TEMPERATURE: 98 F | WEIGHT: 195 LBS | HEIGHT: 69 IN | SYSTOLIC BLOOD PRESSURE: 110 MMHG | DIASTOLIC BLOOD PRESSURE: 70 MMHG | BODY MASS INDEX: 28.88 KG/M2 | HEART RATE: 81 BPM

## 2023-08-22 PROCEDURE — 93000 ELECTROCARDIOGRAM COMPLETE: CPT

## 2023-08-22 PROCEDURE — 99214 OFFICE O/P EST MOD 30 MIN: CPT | Mod: 25

## 2023-08-22 RX ORDER — ASPIRIN 81 MG/1
81 TABLET, COATED ORAL
Qty: 90 | Refills: 3 | Status: ACTIVE | COMMUNITY
Start: 2022-03-14

## 2023-08-22 RX ORDER — DAPAGLIFLOZIN 5 MG/1
5 TABLET, FILM COATED ORAL DAILY
Refills: 0 | Status: COMPLETED | COMMUNITY
End: 2023-08-22

## 2023-08-22 RX ORDER — ICOSAPENT ETHYL 1000 MG/1
1 CAPSULE ORAL 3 TIMES DAILY
Qty: 540 | Refills: 3 | Status: COMPLETED | COMMUNITY
Start: 2022-08-03 | End: 2023-08-22

## 2023-08-22 RX ORDER — ROSUVASTATIN CALCIUM 10 MG/1
10 TABLET, FILM COATED ORAL DAILY
Qty: 90 | Refills: 3 | Status: ACTIVE | COMMUNITY
Start: 2021-03-12

## 2023-08-22 RX ORDER — SITAGLIPTIN AND METFORMIN HYDROCHLORIDE 50; 1000 MG/1; MG/1
50-1000 TABLET, FILM COATED ORAL TWICE DAILY
Refills: 0 | Status: COMPLETED | COMMUNITY
End: 2023-08-22

## 2023-08-22 NOTE — ASSESSMENT
[FreeTextEntry1] : CAD, s/p CABG for multivessel disease DM DL Normal BP. LFT's reviewed KAL[facility] Lung scarring - f/u by pulmonary Cath report, CABG report, echo, hospital notes reviewed. CT chest reviewed Nuclear reviewed, stress echo reviewed, discussed with the patient.

## 2023-08-22 NOTE — PHYSICAL EXAM
[General Appearance - Well Developed] : well developed [Normal Appearance] : normal appearance [Well Groomed] : well groomed [General Appearance - Well Nourished] : well nourished [No Deformities] : no deformities [General Appearance - In No Acute Distress] : no acute distress [Normal Conjunctiva] : the conjunctiva exhibited no abnormalities [] : no respiratory distress [Respiration, Rhythm And Depth] : normal respiratory rhythm and effort [Auscultation Breath Sounds / Voice Sounds] : lungs were clear to auscultation bilaterally [Heart Rate And Rhythm] : heart rate and rhythm were normal [Heart Sounds] : normal S1 and S2 [Murmurs] : no murmurs present [Edema] : no peripheral edema present [Bowel Sounds] : normal bowel sounds [FreeTextEntry1] : sternotomy scar [Abdomen Soft] : soft [Abdomen Tenderness] : non-tender [Abnormal Walk] : normal gait [Nail Clubbing] : no clubbing of the fingernails [Cyanosis, Localized] : no localized cyanosis [Petechial Hemorrhages (___cm)] : no petechial hemorrhages [Skin Color & Pigmentation] : normal skin color and pigmentation [No Venous Stasis] : no venous stasis [Oriented To Time, Place, And Person] : oriented to person, place, and time [Affect] : the affect was normal

## 2023-08-22 NOTE — HISTORY OF PRESENT ILLNESS
[FreeTextEntry1] : 53 yo M with HIV on ART, DM (HgA1C 6.9), DLD, had ACS in 2019, cardiac Cath c/w with multivessel disease, reduced EF. Patient underwent CABG x 4 (LIMA- LAD, SVG- OM1, SVG- OM2, radial- PDA) on 9/10/19. Patient tolerated procedure well, and had no issues coming off bypass.  Post op EF improved to 35% from 25% preop. Repeat echo - EF 45-50%. Nuclear 3/22 - predominantly fixed defect with some kalin-infarct ischemia, EF 40%.  Restarted Crestor - Labs reviewed. Scheduled for repeat Labs for lipids and A1c.  On stress test walked 9 minutes on Prashant w/o angina.

## 2023-08-22 NOTE — DISCUSSION/SUMMARY
[Patient] : the patient [Minutes Spent: ___] : for [unfilled] ~Uminutes [FreeTextEntry1] : C/w ASA, Palvix Medical therapy for CAD C/w Crestor 20 mg. C/w Vascepa  Check Labs with endocrine and PMD - will get a copy Results of 2D echo noted - EF improved. No need for AICD at tis time.  C/w BB - changed to succinate Started ARB.  Palpitations - PVC on event monitor. C/w BB Repeat lasb before next visit. GI follow-up. Consider PPI (Protonix) for David's - f/u with GI F/u with pulmonary. C/w CPAP for now. F/u in 6 months or if any symptoms - immediately

## 2023-09-11 LAB
ESTIMATED AVERAGE GLUCOSE: 183 MG/DL
HBA1C MFR BLD HPLC: 8 %
TSH SERPL-ACNC: 2.59 UIU/ML

## 2023-09-12 LAB
ALBUMIN SERPL ELPH-MCNC: 4.7 G/DL
ALP BLD-CCNC: 61 U/L
ALT SERPL-CCNC: 106 U/L
ANION GAP SERPL CALC-SCNC: 15 MMOL/L
AST SERPL-CCNC: 70 U/L
BILIRUB SERPL-MCNC: 0.6 MG/DL
BUN SERPL-MCNC: 13 MG/DL
CALCIUM SERPL-MCNC: 9.1 MG/DL
CHLORIDE SERPL-SCNC: 98 MMOL/L
CHOLEST SERPL-MCNC: 165 MG/DL
CO2 SERPL-SCNC: 20 MMOL/L
CREAT SERPL-MCNC: 0.9 MG/DL
CREAT SPEC-SCNC: 131 MG/DL
EGFR: 103 ML/MIN/1.73M2
GLUCOSE SERPL-MCNC: 162 MG/DL
HDLC SERPL-MCNC: 39 MG/DL
LDLC SERPL CALC-MCNC: 75 MG/DL
MICROALBUMIN 24H UR DL<=1MG/L-MCNC: 23.8 MG/DL
MICROALBUMIN/CREAT 24H UR-RTO: 182 MG/G
NONHDLC SERPL-MCNC: 126 MG/DL
POTASSIUM SERPL-SCNC: 4.5 MMOL/L
PROT SERPL-MCNC: 7 G/DL
SODIUM SERPL-SCNC: 133 MMOL/L
TRIGL SERPL-MCNC: 255 MG/DL

## 2023-09-13 ENCOUNTER — APPOINTMENT (OUTPATIENT)
Dept: ENDOCRINOLOGY | Facility: CLINIC | Age: 53
End: 2023-09-13
Payer: COMMERCIAL

## 2023-09-13 VITALS
OXYGEN SATURATION: 98 % | BODY MASS INDEX: 28.88 KG/M2 | WEIGHT: 195 LBS | TEMPERATURE: 97.4 F | HEIGHT: 69 IN | HEART RATE: 78 BPM | DIASTOLIC BLOOD PRESSURE: 80 MMHG | SYSTOLIC BLOOD PRESSURE: 124 MMHG

## 2023-09-13 DIAGNOSIS — R74.01 ELEVATION OF LEVELS OF LIVER TRANSAMINASE LEVELS: ICD-10-CM

## 2023-09-13 PROCEDURE — 99214 OFFICE O/P EST MOD 30 MIN: CPT

## 2023-09-15 NOTE — PATIENT PROFILE ADULT - NSPROGENARRIVEDFROM_GEN_A_NUR
pt admitted for OBS, cardiac monitor in place. VSS. pt in no distress. IVL intact. pt awaiting repeat trop and EKG. Dinner given. Safety and comfort measures in place. will cont to monitor., home

## 2023-10-05 RX ORDER — ICOSAPENT ETHYL 1 G/1
1 CAPSULE ORAL
Qty: 360 | Refills: 2 | Status: ACTIVE | COMMUNITY
Start: 2020-08-04 | End: 1900-01-01

## 2023-11-21 ENCOUNTER — RX RENEWAL (OUTPATIENT)
Age: 53
End: 2023-11-21

## 2023-12-08 LAB
ALBUMIN SERPL ELPH-MCNC: 4.6 G/DL
ALP BLD-CCNC: 100 U/L
ALT SERPL-CCNC: 124 U/L
ANION GAP SERPL CALC-SCNC: 15 MMOL/L
AST SERPL-CCNC: 79 U/L
BASOPHILS # BLD AUTO: 0.04 K/UL
BASOPHILS NFR BLD AUTO: 0.8 %
BILIRUB SERPL-MCNC: 0.4 MG/DL
BUN SERPL-MCNC: 12 MG/DL
CALCIUM SERPL-MCNC: 9.2 MG/DL
CHLORIDE SERPL-SCNC: 98 MMOL/L
CHOLEST SERPL-MCNC: 142 MG/DL
CO2 SERPL-SCNC: 22 MMOL/L
CREAT SERPL-MCNC: 0.9 MG/DL
CREAT SPEC-SCNC: 170 MG/DL
EGFR: 103 ML/MIN/1.73M2
EOSINOPHIL # BLD AUTO: 0.13 K/UL
EOSINOPHIL NFR BLD AUTO: 2.5 %
ESTIMATED AVERAGE GLUCOSE: 206 MG/DL
GLUCOSE SERPL-MCNC: 253 MG/DL
HBA1C MFR BLD HPLC: 8.8 %
HCT VFR BLD CALC: 45.9 %
HDLC SERPL-MCNC: 34 MG/DL
HGB BLD-MCNC: 15.4 G/DL
IMM GRANULOCYTES NFR BLD AUTO: 0.4 %
LDLC SERPL CALC-MCNC: 50 MG/DL
LYMPHOCYTES # BLD AUTO: 1.84 K/UL
LYMPHOCYTES NFR BLD AUTO: 35.7 %
MAN DIFF?: NORMAL
MCHC RBC-ENTMCNC: 29.3 PG
MCHC RBC-ENTMCNC: 33.6 G/DL
MCV RBC AUTO: 87.4 FL
MICROALBUMIN 24H UR DL<=1MG/L-MCNC: 76.4 MG/DL
MICROALBUMIN/CREAT 24H UR-RTO: 448 MG/G
MONOCYTES # BLD AUTO: 0.43 K/UL
MONOCYTES NFR BLD AUTO: 8.3 %
NEUTROPHILS # BLD AUTO: 2.7 K/UL
NEUTROPHILS NFR BLD AUTO: 52.3 %
NONHDLC SERPL-MCNC: 108 MG/DL
PLATELET # BLD AUTO: 139 K/UL
POTASSIUM SERPL-SCNC: 4.9 MMOL/L
PROT SERPL-MCNC: 7 G/DL
RBC # BLD: 5.25 M/UL
RBC # FLD: 12.1 %
SODIUM SERPL-SCNC: 135 MMOL/L
TRIGL SERPL-MCNC: 291 MG/DL
WBC # FLD AUTO: 5.16 K/UL

## 2023-12-19 ENCOUNTER — APPOINTMENT (OUTPATIENT)
Dept: ENDOCRINOLOGY | Facility: CLINIC | Age: 53
End: 2023-12-19
Payer: COMMERCIAL

## 2023-12-19 VITALS
SYSTOLIC BLOOD PRESSURE: 118 MMHG | BODY MASS INDEX: 28.88 KG/M2 | DIASTOLIC BLOOD PRESSURE: 78 MMHG | HEIGHT: 69 IN | OXYGEN SATURATION: 98 % | HEART RATE: 75 BPM | WEIGHT: 195 LBS

## 2023-12-19 DIAGNOSIS — R80.9 PROTEINURIA, UNSPECIFIED: ICD-10-CM

## 2023-12-19 PROCEDURE — 99213 OFFICE O/P EST LOW 20 MIN: CPT

## 2023-12-19 RX ORDER — TIRZEPATIDE 7.5 MG/.5ML
7.5 INJECTION, SOLUTION SUBCUTANEOUS
Qty: 1 | Refills: 5 | Status: ACTIVE | COMMUNITY
Start: 2023-12-19 | End: 1900-01-01

## 2023-12-19 RX ORDER — LOSARTAN POTASSIUM 25 MG/1
25 TABLET, FILM COATED ORAL
Qty: 90 | Refills: 3 | Status: DISCONTINUED | COMMUNITY
Start: 2022-03-29 | End: 2023-12-19

## 2023-12-19 RX ORDER — SEMAGLUTIDE 0.68 MG/ML
2 INJECTION, SOLUTION SUBCUTANEOUS
Qty: 1 | Refills: 2 | Status: DISCONTINUED | COMMUNITY
Start: 2023-06-01 | End: 2023-12-19

## 2023-12-19 NOTE — PHYSICAL EXAM
[Alert] : alert [Healthy Appearance] : healthy appearance [No Acute Distress] : no acute distress [No Proptosis] : no proptosis [No Lid Lag] : no lid lag [Thyroid Not Enlarged] : the thyroid was not enlarged [No Thyroid Nodules] : no palpable thyroid nodules [No Respiratory Distress] : no respiratory distress [No Accessory Muscle Use] : no accessory muscle use [Clear to Auscultation] : lungs were clear to auscultation bilaterally [No Murmurs] : no murmurs [Regular Rhythm] : with a regular rhythm [No Edema] : no peripheral edema [No CVA Tenderness] : no ~M costovertebral angle tenderness [No Stigmata of Cushings Syndrome] : no stigmata of Cushings Syndrome [Oriented x3] : oriented to person, place, and time [Abdominal Striae] : no abdominal striae [Acanthosis Nigricans] : no acanthosis nigricans [Hirsutism] : no hirsutism [de-identified] : BMI 28

## 2023-12-19 NOTE — DATA REVIEWED
[FreeTextEntry1] : 1/2023: A1c 7.6%   AST 43  ALT 74 glucose 160 Tg 163  LDL 90   9/2023: A1c 8%  TSH 2.59  Tg 255 LDL 75  glucose 162   crea 0.9  AST 70   12/2023: 8.8%  Tg 291  LDL 50 glucose 253   AST 79

## 2023-12-19 NOTE — HISTORY OF PRESENT ILLNESS
[FreeTextEntry1] : Mr. BRISEIDA SIMON  Is  a 53 year  old male with HIV  on ART ,, DL ,  type 2 DM  , fatty liver CAD  s/p CABG ( 2019 ) CHF , sleep apnea  who presented for follow up  evaluation of type 2 Diabetes:    Diagnosis: 5 years ago  Current Regimen:  Ozempic 2 mg Q week, metformin 1000 mg BID Previous regimens: janumet / farxiga  Compliance:  good SMBG/CGM :  remain above target  Hypoglycemia:no  Polyuria/polydipsia :yes  Weight change/BMI: stable 195-200 , DID not lose yet with ozempic  Diet: carb consistent  Exercise: active at work  HBa1c trend: 7.6%..8% (9/2023) ...8.8% (12/2023)   .prevention   Statin: rosuvastatin 10 mg daily , vascepa 2 g BID  ACE/ARB :losartan 50 mg daily  Eye examination: due Neuropathy: no

## 2023-12-19 NOTE — ASSESSMENT
[Diabetes Foot Care] : diabetes foot care [Long Term Vascular Complications] : long term vascular complications of diabetes [Carbohydrate Consistent Diet] : carbohydrate consistent diet [Importance of Diet and Exercise] : importance of diet and exercise to improve glycemic control, achieve weight loss and improve cardiovascular health [Exercise/Effect on Glucose] : exercise/effect on glucose [Self Monitoring of Blood Glucose] : self monitoring of blood glucose [Injection Technique, Storage, Sharps Disposal] : injection technique, storage, and sharps disposal [Retinopathy Screening] : Patient was referred to ophthalmology for retinopathy screening [FreeTextEntry1] : Mr. BRISEIDA SIMON Is a 53 year old male with HIV on ART , DL , type 2 DM , fatty liver CAD s/p CABG ( 2019 ) CHF , sleep apnea who presented for follow up evaluation of type 2 Diabetes:    # uncontrolled type 2 DM , DL, overweight / fatty liver - A1c 8.8 % on Ozempic 2  mg Q week, metformin 1000 mg BID - STOP OZEMPIC  - start mounjaro 7.5 mg Q week and will up to 10 mg if no improvement in SMBG  - having recurrent yeast infection, Off farxiga - continue metformin 1000 mg BID - continue rosuvastatin 10 mg daily , vascepa 2 g BID -ACR high, increased Losartan to 50 mg daily and still high, followed by nephrology, will aim for better DM control , monitor BP  -dilated eye exam - podiatry follow up. - discussed dietary changes especially with fruits

## 2023-12-19 NOTE — REVIEW OF SYSTEMS
[Polyuria] : polyuria [As Noted in HPI] : as noted in HPI [Acanthosis] : acanthosis [Fatigue] : no fatigue [Recent Weight Gain (___ Lbs)] : no recent weight gain [Recent Weight Loss (___ Lbs)] : no recent weight loss [Visual Field Defect] : no visual field defect [Blurred Vision] : no blurred vision [Dysphagia] : no dysphagia [Neck Pain] : no neck pain [Dysphonia] : no dysphonia [Chest Pain] : no chest pain [Palpitations] : no palpitations [Fast Heart Rate] : heart rate is not fast [Lower Ext Edema] : no lower extremity edema [Shortness Of Breath] : no shortness of breath [SOB on Exertion] : no shortness of breath on exertion [Nausea] : no nausea [Vomiting] : no vomiting [Pain/Numbness of Digits] : no pain/numbness of digits [Cold Intolerance] : no cold intolerance [Heat Intolerance] : no heat intolerance

## 2024-01-04 ENCOUNTER — RX RENEWAL (OUTPATIENT)
Age: 54
End: 2024-01-04

## 2024-01-06 ENCOUNTER — RX RENEWAL (OUTPATIENT)
Age: 54
End: 2024-01-06

## 2024-01-06 RX ORDER — BICTEGRAVIR SODIUM, EMTRICITABINE, AND TENOFOVIR ALAFENAMIDE FUMARATE 50; 200; 25 MG/1; MG/1; MG/1
50-200-25 TABLET ORAL DAILY
Qty: 30 | Refills: 5 | Status: ACTIVE | COMMUNITY
Start: 2023-11-21 | End: 1900-01-01

## 2024-01-08 ENCOUNTER — RX RENEWAL (OUTPATIENT)
Age: 54
End: 2024-01-08

## 2024-02-05 NOTE — ED PROVIDER NOTE - CROS ED ENMT ALL NEG
[Use of Plain Language] : use of plain language [Adequate] : adequate [None] : none [] : I have reviewed management goals with caretaker and provided a copy of care plan - - -

## 2024-02-09 ENCOUNTER — APPOINTMENT (OUTPATIENT)
Dept: PULMONOLOGY | Facility: CLINIC | Age: 54
End: 2024-02-09
Payer: COMMERCIAL

## 2024-02-09 VITALS
WEIGHT: 190 LBS | HEART RATE: 65 BPM | DIASTOLIC BLOOD PRESSURE: 70 MMHG | OXYGEN SATURATION: 98 % | SYSTOLIC BLOOD PRESSURE: 120 MMHG | BODY MASS INDEX: 28.06 KG/M2

## 2024-02-09 DIAGNOSIS — R93.89 ABNORMAL FINDINGS ON DIAGNOSTIC IMAGING OF OTHER SPECIFIED BODY STRUCTURES: ICD-10-CM

## 2024-02-09 PROCEDURE — G2211 COMPLEX E/M VISIT ADD ON: CPT

## 2024-02-09 PROCEDURE — 99213 OFFICE O/P EST LOW 20 MIN: CPT

## 2024-02-09 NOTE — DISCUSSION/SUMMARY
[FreeTextEntry1] : KALYANI COMPLIANT AND BENEFITING LLL OPACITY/ EX SMOKER/ CHEST CT FUP PFT WEIGHT LOSS

## 2024-02-20 ENCOUNTER — RESULT CHARGE (OUTPATIENT)
Age: 54
End: 2024-02-20

## 2024-02-20 ENCOUNTER — APPOINTMENT (OUTPATIENT)
Dept: CARDIOLOGY | Facility: CLINIC | Age: 54
End: 2024-02-20
Payer: COMMERCIAL

## 2024-02-20 VITALS
BODY MASS INDEX: 28.73 KG/M2 | DIASTOLIC BLOOD PRESSURE: 64 MMHG | HEART RATE: 104 BPM | HEIGHT: 69 IN | WEIGHT: 194 LBS | SYSTOLIC BLOOD PRESSURE: 102 MMHG

## 2024-02-20 DIAGNOSIS — I25.10 ATHEROSCLEROTIC HEART DISEASE OF NATIVE CORONARY ARTERY W/OUT ANGINA PECTORIS: ICD-10-CM

## 2024-02-20 DIAGNOSIS — G47.33 OBSTRUCTIVE SLEEP APNEA (ADULT) (PEDIATRIC): ICD-10-CM

## 2024-02-20 DIAGNOSIS — Z95.1 PRESENCE OF AORTOCORONARY BYPASS GRAFT: ICD-10-CM

## 2024-02-20 PROCEDURE — G2211 COMPLEX E/M VISIT ADD ON: CPT

## 2024-02-20 PROCEDURE — 93000 ELECTROCARDIOGRAM COMPLETE: CPT

## 2024-02-20 PROCEDURE — 99214 OFFICE O/P EST MOD 30 MIN: CPT

## 2024-02-20 RX ORDER — METOPROLOL SUCCINATE 25 MG/1
25 TABLET, EXTENDED RELEASE ORAL
Qty: 90 | Refills: 3 | Status: COMPLETED | COMMUNITY
Start: 2019-10-07 | End: 2024-02-20

## 2024-02-20 NOTE — ASSESSMENT
[FreeTextEntry1] : CAD, s/p CABG for multivessel disease DM DL Normal BP. LFT's reviewed K[facility] Lung scarring - f/u by pulmonary Cath report, CABG report, echo, hospital notes reviewed. CT chest reviewed Nuclear reviewed, stress echo reviewed, discussed with the patient. Sinus tachy - increase BB

## 2024-02-20 NOTE — DISCUSSION/SUMMARY
[FreeTextEntry1] : C/w ASA, [facility] Medical therapy for CAD C/w Crestor 20 mg. C/w Vascepa  Check Labs with endocrine and PMD - will get a copy - needs better control of DM - discussed. Results of 2D echo noted - EF improved. No need for AICD at tis time.  C/w BB - changed to succinate 100 mg Started ARB.  Palpitations - PVC on event monitor. C/w BB, compliance discussed. Repeat labs before next visit. GI follow-up. Consider PPI (Protonix) for David's - f/u with GI F/u with pulmonary. C/w CPAP for now. F/u in 6 months or if any symptoms - immediately

## 2024-02-20 NOTE — HISTORY OF PRESENT ILLNESS
[FreeTextEntry1] : 52 yo M with HIV on ART, DM (HgA1C 6.9), DLD, had ACS in 2019, cardiac Cath c/w with multivessel disease, reduced EF. Patient underwent CABG x 4 (LIMA- LAD, SVG- OM1, SVG- OM2, radial- PDA) on 9/10/19. Patient tolerated procedure well, and had no issues coming off bypass.  Post op EF improved to 35% from 25% preop. Repeat echo - EF 45-50%. Nuclear 3/22 - predominantly fixed defect with some kalin-infarct ischemia, EF 40%.  Restarted Crestor - Labs reviewed. Scheduled for repeat Labs for lipids and A1c - last 8.8%, meds were changed.  On stress test walked 9 minutes on Prashant w/o angina. Rides bike for over 5 miles with no issues. HR is elevated today - did not take metoprolol this AM.

## 2024-02-26 ENCOUNTER — RX RENEWAL (OUTPATIENT)
Age: 54
End: 2024-02-26

## 2024-02-26 RX ORDER — CLOPIDOGREL BISULFATE 75 MG/1
75 TABLET, FILM COATED ORAL
Qty: 45 | Refills: 2 | Status: ACTIVE | COMMUNITY
Start: 2019-09-19 | End: 1900-01-01

## 2024-03-14 ENCOUNTER — APPOINTMENT (OUTPATIENT)
Dept: ENDOCRINOLOGY | Facility: CLINIC | Age: 54
End: 2024-03-14

## 2024-03-24 ENCOUNTER — RESULT REVIEW (OUTPATIENT)
Age: 54
End: 2024-03-24

## 2024-03-24 ENCOUNTER — OUTPATIENT (OUTPATIENT)
Dept: OUTPATIENT SERVICES | Facility: HOSPITAL | Age: 54
LOS: 1 days | End: 2024-03-24
Payer: COMMERCIAL

## 2024-03-24 DIAGNOSIS — Z00.8 ENCOUNTER FOR OTHER GENERAL EXAMINATION: ICD-10-CM

## 2024-03-24 DIAGNOSIS — R91.1 SOLITARY PULMONARY NODULE: ICD-10-CM

## 2024-03-24 DIAGNOSIS — Z90.49 ACQUIRED ABSENCE OF OTHER SPECIFIED PARTS OF DIGESTIVE TRACT: Chronic | ICD-10-CM

## 2024-03-24 PROCEDURE — 71250 CT THORAX DX C-: CPT

## 2024-03-24 PROCEDURE — 71250 CT THORAX DX C-: CPT | Mod: 26

## 2024-03-25 DIAGNOSIS — R91.1 SOLITARY PULMONARY NODULE: ICD-10-CM

## 2024-03-26 ENCOUNTER — RX RENEWAL (OUTPATIENT)
Age: 54
End: 2024-03-26

## 2024-03-26 RX ORDER — METOPROLOL SUCCINATE 100 MG/1
100 TABLET, EXTENDED RELEASE ORAL DAILY
Qty: 90 | Refills: 0 | Status: ACTIVE | COMMUNITY
Start: 2019-10-07 | End: 1900-01-01

## 2024-03-30 ENCOUNTER — TRANSCRIPTION ENCOUNTER (OUTPATIENT)
Age: 54
End: 2024-03-30

## 2024-03-30 RX ORDER — METFORMIN HYDROCHLORIDE 1000 MG/1
1000 TABLET, COATED ORAL TWICE DAILY
Qty: 60 | Refills: 5 | Status: ACTIVE | COMMUNITY
Start: 2023-06-01 | End: 1900-01-01

## 2024-04-12 ENCOUNTER — LABORATORY RESULT (OUTPATIENT)
Age: 54
End: 2024-04-12

## 2024-04-15 LAB
ALBUMIN SERPL ELPH-MCNC: 4.4 G/DL
ALP BLD-CCNC: 89 U/L
ALT SERPL-CCNC: 60 U/L
ANION GAP SERPL CALC-SCNC: 15 MMOL/L
AST SERPL-CCNC: 41 U/L
BILIRUB SERPL-MCNC: 0.4 MG/DL
BUN SERPL-MCNC: 11 MG/DL
C PEPTIDE SERPL-MCNC: 4.8 NG/ML
CALCIUM SERPL-MCNC: 8.9 MG/DL
CHLORIDE SERPL-SCNC: 101 MMOL/L
CHOLEST SERPL-MCNC: 135 MG/DL
CO2 SERPL-SCNC: 22 MMOL/L
CREAT SERPL-MCNC: 0.8 MG/DL
CREAT SPEC-SCNC: 156 MG/DL
EGFR: 106 ML/MIN/1.73M2
ESTIMATED AVERAGE GLUCOSE: 200 MG/DL
GLUCOSE SERPL-MCNC: 193 MG/DL
HBA1C MFR BLD HPLC: 8.6 %
HCT VFR BLD CALC: 43 %
HDLC SERPL-MCNC: 35 MG/DL
HGB BLD-MCNC: 14.5 G/DL
LDLC SERPL CALC-MCNC: 56 MG/DL
MCHC RBC-ENTMCNC: 29.6 PG
MCHC RBC-ENTMCNC: 33.7 G/DL
MCV RBC AUTO: 87.8 FL
MICROALBUMIN 24H UR DL<=1MG/L-MCNC: 30.4 MG/DL
MICROALBUMIN/CREAT 24H UR-RTO: 194 MG/G
NONHDLC SERPL-MCNC: 100 MG/DL
PLATELET # BLD AUTO: 141 K/UL
PMV BLD AUTO: 0 /100 WBCS
PMV BLD: 9.4 FL
POTASSIUM SERPL-SCNC: 4.3 MMOL/L
PROT SERPL-MCNC: 7 G/DL
RBC # BLD: 4.9 M/UL
RBC # FLD: 12.6 %
SODIUM SERPL-SCNC: 138 MMOL/L
TRIGL SERPL-MCNC: 219 MG/DL
VIT B12 SERPL-MCNC: 549 PG/ML
WBC # FLD AUTO: 5.4 K/UL

## 2024-04-16 ENCOUNTER — APPOINTMENT (OUTPATIENT)
Dept: ENDOCRINOLOGY | Facility: CLINIC | Age: 54
End: 2024-04-16
Payer: COMMERCIAL

## 2024-04-16 VITALS
SYSTOLIC BLOOD PRESSURE: 100 MMHG | WEIGHT: 195 LBS | BODY MASS INDEX: 28.88 KG/M2 | DIASTOLIC BLOOD PRESSURE: 70 MMHG | OXYGEN SATURATION: 98 % | HEART RATE: 100 BPM | HEIGHT: 69 IN

## 2024-04-16 DIAGNOSIS — E78.00 PURE HYPERCHOLESTEROLEMIA, UNSPECIFIED: ICD-10-CM

## 2024-04-16 DIAGNOSIS — E66.3 OVERWEIGHT: ICD-10-CM

## 2024-04-16 DIAGNOSIS — E11.9 TYPE 2 DIABETES MELLITUS W/OUT COMPLICATIONS: ICD-10-CM

## 2024-04-16 PROCEDURE — 99213 OFFICE O/P EST LOW 20 MIN: CPT

## 2024-04-16 RX ORDER — BLOOD-GLUCOSE SENSOR
EACH MISCELLANEOUS
Qty: 3 | Refills: 4 | Status: ACTIVE | COMMUNITY
Start: 2024-04-16 | End: 1900-01-01

## 2024-04-16 RX ORDER — TIRZEPATIDE 10 MG/.5ML
10 INJECTION, SOLUTION SUBCUTANEOUS
Qty: 1 | Refills: 5 | Status: ACTIVE | COMMUNITY
Start: 2024-04-16 | End: 1900-01-01

## 2024-04-16 NOTE — PHYSICAL EXAM
[Alert] : alert [Healthy Appearance] : healthy appearance [No Acute Distress] : no acute distress [No Proptosis] : no proptosis [No Lid Lag] : no lid lag [Thyroid Not Enlarged] : the thyroid was not enlarged [No Thyroid Nodules] : no palpable thyroid nodules [No Respiratory Distress] : no respiratory distress [No Accessory Muscle Use] : no accessory muscle use [Clear to Auscultation] : lungs were clear to auscultation bilaterally [No Murmurs] : no murmurs [Regular Rhythm] : with a regular rhythm [No Edema] : no peripheral edema [No CVA Tenderness] : no ~M costovertebral angle tenderness [No Stigmata of Cushings Syndrome] : no stigmata of Cushings Syndrome [Oriented x3] : oriented to person, place, and time [Abdominal Striae] : no abdominal striae [Acanthosis Nigricans] : no acanthosis nigricans [Hirsutism] : no hirsutism [de-identified] : BMI 28

## 2024-04-16 NOTE — HISTORY OF PRESENT ILLNESS
[FreeTextEntry1] : Mr. BRISEIDA SIMON  Is  a 53 year  old male with HIV  on ART ,, DL ,  type 2 DM  , fatty liver CAD  s/p CABG ( 2019 ) CHF , sleep apnea  who presented for follow up evaluation of type 2 Diabetes:    Diagnosis: 5 years ago  Current Regimen: Mounjaro 7.5 mg Q week , metformin 1000 mg BID Previous regimens: janumet / farxiga  Compliance:  good SMBG/CGM :  remain above target  Hypoglycemia:no  Polyuria/polydipsia :yes  Weight change/BMI: stable 195-200 , DID not lose yet Diet: carb consistent  Exercise: active at work  HBa1c trend: 7.6%..8% (9/2023) ...8.8% (12/2023)...8.6%( 4/2024   .prevention   Statin: rosuvastatin 10 mg daily , vascepa 2 g BID  ACE/ARB :losartan 50 mg daily  Eye examination: due Neuropathy: no

## 2024-04-16 NOTE — DATA REVIEWED
[FreeTextEntry1] : 1/2023: A1c 7.6%   AST 43  ALT 74 glucose 160 Tg 163  LDL 90   9/2023: A1c 8%  TSH 2.59  Tg 255 LDL 75  glucose 162   crea 0.9  AST 70   12/2023: 8.8%  Tg 291  LDL 50 glucose 253   AST 79     4/2024: A1c 8.6% LDL 56  Tg 219 glucose 193  Crea 0.8   b12 549   c peptid 4.8

## 2024-04-16 NOTE — ASSESSMENT
[Diabetes Foot Care] : diabetes foot care [Long Term Vascular Complications] : long term vascular complications of diabetes [Carbohydrate Consistent Diet] : carbohydrate consistent diet [Importance of Diet and Exercise] : importance of diet and exercise to improve glycemic control, achieve weight loss and improve cardiovascular health [Exercise/Effect on Glucose] : exercise/effect on glucose [Self Monitoring of Blood Glucose] : self monitoring of blood glucose [Injection Technique, Storage, Sharps Disposal] : injection technique, storage, and sharps disposal [Retinopathy Screening] : Patient was referred to ophthalmology for retinopathy screening [FreeTextEntry1] : Mr. BRISEIDA SIMON Is a 53 year old male with HIV on ART , DL , type 2 DM , fatty liver CAD s/p CABG ( 2019 ) CHF , sleep apnea who presented for follow up evaluation of type 2 Diabetes:    # uncontrolled type 2 DM , DL, overweight / fatty liver - A1c 8.6 % on Mounjaro 7.5 mg Q week , metformin 1000 mg BID - increase  mounjaro to 10  mg Q week  - having recurrent yeast infection, Off farxiga - continue metformin 1000 mg BID - will highly benefit from using CGM to monitor glucose , send dexcom  - continue rosuvastatin 10 mg daily , vascepa 2 g BID -ACR improved on  Losartan to 50 mg daily   followed by nephrology, will aim for better DM control , monitor BP  -dilated eye exam - podiatry follow up. - discussed dietary changes especially with fruits

## 2024-05-30 ENCOUNTER — RX RENEWAL (OUTPATIENT)
Age: 54
End: 2024-05-30

## 2024-05-30 RX ORDER — LOSARTAN POTASSIUM 50 MG/1
50 TABLET, FILM COATED ORAL DAILY
Qty: 30 | Refills: 1 | Status: ACTIVE | COMMUNITY
Start: 2023-09-13 | End: 1900-01-01

## 2024-07-11 ENCOUNTER — RX RENEWAL (OUTPATIENT)
Age: 54
End: 2024-07-11

## 2024-08-12 ENCOUNTER — RX RENEWAL (OUTPATIENT)
Age: 54
End: 2024-08-12

## 2024-08-13 ENCOUNTER — RX RENEWAL (OUTPATIENT)
Age: 54
End: 2024-08-13

## 2024-08-19 ENCOUNTER — APPOINTMENT (OUTPATIENT)
Dept: ENDOCRINOLOGY | Facility: CLINIC | Age: 54
End: 2024-08-19
Payer: COMMERCIAL

## 2024-08-19 VITALS
HEART RATE: 85 BPM | SYSTOLIC BLOOD PRESSURE: 118 MMHG | HEIGHT: 69 IN | OXYGEN SATURATION: 98 % | DIASTOLIC BLOOD PRESSURE: 60 MMHG | BODY MASS INDEX: 28.88 KG/M2 | WEIGHT: 195 LBS

## 2024-08-19 DIAGNOSIS — R80.9 PROTEINURIA, UNSPECIFIED: ICD-10-CM

## 2024-08-19 PROCEDURE — 99213 OFFICE O/P EST LOW 20 MIN: CPT

## 2024-08-19 RX ORDER — BLOOD-GLUCOSE SENSOR
EACH MISCELLANEOUS
Qty: 2 | Refills: 5 | Status: ACTIVE | COMMUNITY
Start: 2024-08-19 | End: 1900-01-01

## 2024-08-19 RX ORDER — LOSARTAN POTASSIUM 100 MG/1
100 TABLET, FILM COATED ORAL
Qty: 90 | Refills: 2 | Status: ACTIVE | COMMUNITY
Start: 2024-08-19 | End: 1900-01-01

## 2024-08-19 RX ORDER — TIRZEPATIDE 12.5 MG/.5ML
12.5 INJECTION, SOLUTION SUBCUTANEOUS
Qty: 1 | Refills: 5 | Status: ACTIVE | COMMUNITY
Start: 2024-08-19 | End: 1900-01-01

## 2024-08-19 NOTE — ASSESSMENT
[Diabetes Foot Care] : diabetes foot care [Long Term Vascular Complications] : long term vascular complications of diabetes [Carbohydrate Consistent Diet] : carbohydrate consistent diet [Importance of Diet and Exercise] : importance of diet and exercise to improve glycemic control, achieve weight loss and improve cardiovascular health [Exercise/Effect on Glucose] : exercise/effect on glucose [Self Monitoring of Blood Glucose] : self monitoring of blood glucose [Injection Technique, Storage, Sharps Disposal] : injection technique, storage, and sharps disposal [Retinopathy Screening] : Patient was referred to ophthalmology for retinopathy screening [FreeTextEntry1] : Mr. BRISEIDA SIMON Is a 53 year old male with HIV on ART , DL , type 2 DM , fatty liver CAD s/p CABG ( 2019 ) CHF , sleep apnea who presented for follow up evaluation of type 2 Diabetes:    # uncontrolled type 2 DM , DL, overweight / fatty liver - A1c 7.6 % down from 8.6  on Mounjaro 10 mg Q week , metformin 1000 mg BID - increase  mounjaro to 12.5   mg Q week  - having recurrent yeast infection, Off farxiga - continue metformin 1000 mg BID - will highly benefit from using CGM to monitor glucose , dexcom not covered will try gabi  - continue rosuvastatin 10 mg daily , vascepa 2 g BID, Tg remain high  -ACR improved then worsened , can up  Losartan to 100 mg daily  , advised to follow up with nephrology if no improvement ( previously seen )  -dilated eye exam - podiatry follow up. - discussed dietary changes especially with fruits

## 2024-08-19 NOTE — HISTORY OF PRESENT ILLNESS
[FreeTextEntry1] : Mr. BRISEIDA SIMON  Is  a 53 year  old male with HIV  on ART ,, DL ,  type 2 DM  , fatty liver CAD  s/p CABG ( 2019 ) CHF , sleep apnea  who presented for follow up evaluation of type 2 Diabetes:    Diagnosis: 5 years ago  Current Regimen: Mounjaro 10 mg Q week , metformin 1000 mg BID Previous regimens: janumet / farxiga ( had yeast infection )  Compliance:  good SMBG/CGM :   Hypoglycemia:no  Polyuria/polydipsia :yes  Weight change/BMI: stable 170-250, DID not lose yet Diet: carb consistent  Exercise: active at work  HBa1c trend: 7.6%..8% (9/2023) ...8.8% (12/2023)...8.6%( 4/2024)....7.6%( 8/2024)    .prevention   Statin: rosuvastatin 10 mg daily , vascepa 2 g BID  ACE/ARB :losartan 50 mg daily  Eye examination: due Neuropathy: no

## 2024-08-19 NOTE — PHYSICAL EXAM
[Alert] : alert [Healthy Appearance] : healthy appearance [No Acute Distress] : no acute distress [No Proptosis] : no proptosis [No Lid Lag] : no lid lag [Thyroid Not Enlarged] : the thyroid was not enlarged [No Thyroid Nodules] : no palpable thyroid nodules [No Respiratory Distress] : no respiratory distress [No Accessory Muscle Use] : no accessory muscle use [Clear to Auscultation] : lungs were clear to auscultation bilaterally [No Murmurs] : no murmurs [Regular Rhythm] : with a regular rhythm [No Edema] : no peripheral edema [No CVA Tenderness] : no ~M costovertebral angle tenderness [No Stigmata of Cushings Syndrome] : no stigmata of Cushings Syndrome [Oriented x3] : oriented to person, place, and time [Abdominal Striae] : no abdominal striae [Acanthosis Nigricans] : no acanthosis nigricans [Hirsutism] : no hirsutism [de-identified] : BMI 28

## 2024-08-19 NOTE — DATA REVIEWED
[FreeTextEntry1] : 1/2023: A1c 7.6%   AST 43  ALT 74 glucose 160 Tg 163  LDL 90   9/2023: A1c 8%  TSH 2.59  Tg 255 LDL 75  glucose 162   crea 0.9  AST 70   12/2023: 8.8%  Tg 291  LDL 50 glucose 253   AST 79     4/2024: A1c 8.6% LDL 56  Tg 219 glucose 193  Crea 0.8   b12 549   c peptid 4.8  8/2024: A1c 7.6%glucose 214 crea 0.76  GFr 108    TSh 2.57   Ft4 1.3  LDL 57  Tg 422 
no...

## 2024-08-20 ENCOUNTER — RESULT CHARGE (OUTPATIENT)
Age: 54
End: 2024-08-20

## 2024-08-20 ENCOUNTER — APPOINTMENT (OUTPATIENT)
Dept: CARDIOLOGY | Facility: CLINIC | Age: 54
End: 2024-08-20
Payer: COMMERCIAL

## 2024-08-20 VITALS
DIASTOLIC BLOOD PRESSURE: 60 MMHG | SYSTOLIC BLOOD PRESSURE: 94 MMHG | WEIGHT: 195 LBS | BODY MASS INDEX: 28.88 KG/M2 | HEIGHT: 69 IN | HEART RATE: 73 BPM

## 2024-08-20 DIAGNOSIS — I25.10 ATHEROSCLEROTIC HEART DISEASE OF NATIVE CORONARY ARTERY W/OUT ANGINA PECTORIS: ICD-10-CM

## 2024-08-20 DIAGNOSIS — E11.9 TYPE 2 DIABETES MELLITUS W/OUT COMPLICATIONS: ICD-10-CM

## 2024-08-20 DIAGNOSIS — E78.00 PURE HYPERCHOLESTEROLEMIA, UNSPECIFIED: ICD-10-CM

## 2024-08-20 DIAGNOSIS — Z95.1 PRESENCE OF AORTOCORONARY BYPASS GRAFT: ICD-10-CM

## 2024-08-20 PROCEDURE — G2211 COMPLEX E/M VISIT ADD ON: CPT

## 2024-08-20 PROCEDURE — 93000 ELECTROCARDIOGRAM COMPLETE: CPT

## 2024-08-20 PROCEDURE — 99214 OFFICE O/P EST MOD 30 MIN: CPT

## 2024-08-20 NOTE — HISTORY OF PRESENT ILLNESS
[FreeTextEntry1] : 52 yo M with HIV on ART, DM (HgA1C 6.9), DLD, had ACS in 2019, cardiac Cath c/w with multivessel disease, reduced EF. Patient underwent CABG x 4 (LIMA- LAD, SVG- OM1, SVG- OM2, radial- PDA) on 9/10/19. Patient tolerated procedure well, and had no issues coming off bypass.  Post op EF improved to 35% from 25% preop. Repeat echo - EF 45-50%. Nuclear 3/22 - predominantly fixed defect with some kalin-infarct ischemia, EF 40%. Restarted Crestor On stress test walked 9 minutes on Prashant w/o angina. Rides bike for over 5 miles with no issues.  Denies chest pain, no SOB, no edema.  Started on Mounjaro for Diabetic management.  B/p 94/60 no dizziness.  no tachycardia on metoprolol 100mg  08/18/24 HGbA1C 7.6 improved from 8.6  Chol 157 Trig 422 LDL 57 on Crestor 10mg and Vascepa

## 2024-08-20 NOTE — DISCUSSION/SUMMARY
[FreeTextEntry1] : C/w ASA, [facility] Medical therapy for CAD C/w Crestor 20 mg. C/w Vascepa  Check Labs with endocrine and PMD - will get a copy - needs better control of DM - discussed. F/u with endocrine. HgA1c goal below 7%. Results of 2D echo noted - EF improved. No need for AICD at tis time.  C/w BB - changed to succinate 100 mg Started ARB.  Palpitations - PVC on event monitor. C/w BB, compliance discussed. Repeat labs before next visit. GI follow-up. Consider PPI (Protonix) for David's - f/u with GI F/u with pulmonary. C/w CPAP for now. F/u in 6 months or if any symptoms - immediately

## 2024-08-20 NOTE — PHYSICAL EXAM
[General Appearance - Well Developed] : well developed [Normal Appearance] : normal appearance [Well Groomed] : well groomed [General Appearance - Well Nourished] : well nourished [No Deformities] : no deformities [General Appearance - In No Acute Distress] : no acute distress [Normal Conjunctiva] : the conjunctiva exhibited no abnormalities [] : no respiratory distress [Respiration, Rhythm And Depth] : normal respiratory rhythm and effort [Auscultation Breath Sounds / Voice Sounds] : lungs were clear to auscultation bilaterally [Heart Rate And Rhythm] : heart rate and rhythm were normal [Heart Sounds] : normal S1 and S2 [Murmurs] : no murmurs present [Edema] : no peripheral edema present [Bowel Sounds] : normal bowel sounds [Abdomen Soft] : soft [Abdomen Tenderness] : non-tender [Abnormal Walk] : normal gait [Nail Clubbing] : no clubbing of the fingernails [Cyanosis, Localized] : no localized cyanosis [Petechial Hemorrhages (___cm)] : no petechial hemorrhages [No Venous Stasis] : no venous stasis [Skin Color & Pigmentation] : normal skin color and pigmentation [Oriented To Time, Place, And Person] : oriented to person, place, and time [Affect] : the affect was normal [FreeTextEntry1] : sternotomy scar

## 2024-08-20 NOTE — ASSESSMENT
[FreeTextEntry1] : CAD, s/p CABG for multivessel disease DM DL Normal BP. LFT's reviewed KALYANI Lung scarring - f/u by pulmonary Cath report, CABG report, echo, hospital notes reviewed. CT chest reviewed Nuclear reviewed, stress echo reviewed, discussed with the patient. Sinus tachy - increased BB

## 2024-09-07 ENCOUNTER — RX RENEWAL (OUTPATIENT)
Age: 54
End: 2024-09-07

## 2024-09-23 ENCOUNTER — APPOINTMENT (OUTPATIENT)
Dept: PULMONOLOGY | Facility: CLINIC | Age: 54
End: 2024-09-23
Payer: COMMERCIAL

## 2024-09-23 VITALS
BODY MASS INDEX: 28.88 KG/M2 | RESPIRATION RATE: 12 BRPM | OXYGEN SATURATION: 97 % | DIASTOLIC BLOOD PRESSURE: 80 MMHG | SYSTOLIC BLOOD PRESSURE: 150 MMHG | HEIGHT: 69 IN | WEIGHT: 195 LBS | HEART RATE: 83 BPM

## 2024-09-23 DIAGNOSIS — J98.11 ATELECTASIS: ICD-10-CM

## 2024-09-23 DIAGNOSIS — G47.33 OBSTRUCTIVE SLEEP APNEA (ADULT) (PEDIATRIC): ICD-10-CM

## 2024-09-23 PROCEDURE — G2211 COMPLEX E/M VISIT ADD ON: CPT | Mod: NC

## 2024-09-23 PROCEDURE — 99213 OFFICE O/P EST LOW 20 MIN: CPT

## 2024-09-23 NOTE — DISCUSSION/SUMMARY
[FreeTextEntry1] : KALYANI COMPLIANT AND BENEFITING LLL OPACITY/ EX SMOKER/ CHEST CT NOTED PFT WEIGHT LOSS

## 2024-10-02 ENCOUNTER — RX RENEWAL (OUTPATIENT)
Age: 54
End: 2024-10-02

## 2024-11-13 ENCOUNTER — RX RENEWAL (OUTPATIENT)
Age: 54
End: 2024-11-13

## 2024-11-13 RX ORDER — TIRZEPATIDE 12.5 MG/.5ML
12.5 INJECTION, SOLUTION SUBCUTANEOUS
Qty: 2 | Refills: 4 | Status: ACTIVE | COMMUNITY
Start: 2024-11-13 | End: 1900-01-01

## 2024-11-25 ENCOUNTER — TRANSCRIPTION ENCOUNTER (OUTPATIENT)
Age: 54
End: 2024-11-25

## 2025-01-21 ENCOUNTER — APPOINTMENT (OUTPATIENT)
Dept: ENDOCRINOLOGY | Facility: CLINIC | Age: 55
End: 2025-01-21
Payer: COMMERCIAL

## 2025-01-21 VITALS
SYSTOLIC BLOOD PRESSURE: 120 MMHG | BODY MASS INDEX: 28.88 KG/M2 | HEART RATE: 90 BPM | HEIGHT: 69 IN | OXYGEN SATURATION: 99 % | DIASTOLIC BLOOD PRESSURE: 70 MMHG | WEIGHT: 195 LBS

## 2025-01-21 VITALS — BODY MASS INDEX: 28.21 KG/M2 | WEIGHT: 191 LBS

## 2025-01-21 DIAGNOSIS — E78.00 PURE HYPERCHOLESTEROLEMIA, UNSPECIFIED: ICD-10-CM

## 2025-01-21 DIAGNOSIS — E11.9 TYPE 2 DIABETES MELLITUS W/OUT COMPLICATIONS: ICD-10-CM

## 2025-01-21 DIAGNOSIS — R80.9 PROTEINURIA, UNSPECIFIED: ICD-10-CM

## 2025-01-21 PROCEDURE — 99213 OFFICE O/P EST LOW 20 MIN: CPT

## 2025-01-21 RX ORDER — GLIMEPIRIDE 2 MG/1
2 TABLET ORAL
Qty: 30 | Refills: 5 | Status: ACTIVE | COMMUNITY
Start: 2025-01-21 | End: 1900-01-01

## 2025-01-21 RX ORDER — FENOFIBRATE 54 MG/1
54 TABLET ORAL DAILY
Qty: 30 | Refills: 5 | Status: ACTIVE | COMMUNITY
Start: 2025-01-21 | End: 1900-01-01

## 2025-01-23 ENCOUNTER — APPOINTMENT (OUTPATIENT)
Dept: ORTHOPEDIC SURGERY | Facility: CLINIC | Age: 55
End: 2025-01-23
Payer: COMMERCIAL

## 2025-01-23 ENCOUNTER — RX RENEWAL (OUTPATIENT)
Age: 55
End: 2025-01-23

## 2025-01-23 DIAGNOSIS — M25.511 PAIN IN RIGHT SHOULDER: ICD-10-CM

## 2025-01-23 PROCEDURE — 99214 OFFICE O/P EST MOD 30 MIN: CPT

## 2025-02-18 ENCOUNTER — NON-APPOINTMENT (OUTPATIENT)
Age: 55
End: 2025-02-18

## 2025-02-18 ENCOUNTER — APPOINTMENT (OUTPATIENT)
Dept: CARDIOLOGY | Facility: CLINIC | Age: 55
End: 2025-02-18
Payer: COMMERCIAL

## 2025-02-18 ENCOUNTER — RESULT CHARGE (OUTPATIENT)
Age: 55
End: 2025-02-18

## 2025-02-18 VITALS
HEIGHT: 69 IN | DIASTOLIC BLOOD PRESSURE: 78 MMHG | HEART RATE: 93 BPM | SYSTOLIC BLOOD PRESSURE: 105 MMHG | BODY MASS INDEX: 28.29 KG/M2 | WEIGHT: 191 LBS

## 2025-02-18 DIAGNOSIS — E78.00 PURE HYPERCHOLESTEROLEMIA, UNSPECIFIED: ICD-10-CM

## 2025-02-18 DIAGNOSIS — E11.9 TYPE 2 DIABETES MELLITUS W/OUT COMPLICATIONS: ICD-10-CM

## 2025-02-18 DIAGNOSIS — Z95.1 PRESENCE OF AORTOCORONARY BYPASS GRAFT: ICD-10-CM

## 2025-02-18 DIAGNOSIS — I25.10 ATHEROSCLEROTIC HEART DISEASE OF NATIVE CORONARY ARTERY W/OUT ANGINA PECTORIS: ICD-10-CM

## 2025-02-18 PROCEDURE — 93000 ELECTROCARDIOGRAM COMPLETE: CPT

## 2025-02-18 PROCEDURE — G2211 COMPLEX E/M VISIT ADD ON: CPT | Mod: NC

## 2025-02-18 PROCEDURE — 99214 OFFICE O/P EST MOD 30 MIN: CPT

## 2025-02-28 ENCOUNTER — RX RENEWAL (OUTPATIENT)
Age: 55
End: 2025-02-28

## 2025-03-12 ENCOUNTER — RX RENEWAL (OUTPATIENT)
Age: 55
End: 2025-03-12

## 2025-04-06 ENCOUNTER — EMERGENCY (EMERGENCY)
Facility: HOSPITAL | Age: 55
LOS: 0 days | Discharge: HOME | End: 2025-04-06
Attending: EMERGENCY MEDICINE
Payer: COMMERCIAL

## 2025-04-06 VITALS
RESPIRATION RATE: 18 BRPM | TEMPERATURE: 98 F | DIASTOLIC BLOOD PRESSURE: 61 MMHG | OXYGEN SATURATION: 98 % | SYSTOLIC BLOOD PRESSURE: 110 MMHG | HEART RATE: 92 BPM

## 2025-04-06 DIAGNOSIS — Z90.49 ACQUIRED ABSENCE OF OTHER SPECIFIED PARTS OF DIGESTIVE TRACT: Chronic | ICD-10-CM

## 2025-04-06 LAB
ALBUMIN SERPL ELPH-MCNC: 4.7 G/DL — SIGNIFICANT CHANGE UP (ref 3.5–5.2)
ALP SERPL-CCNC: 72 U/L — SIGNIFICANT CHANGE UP (ref 30–115)
ALT FLD-CCNC: 40 U/L — SIGNIFICANT CHANGE UP (ref 0–41)
ANION GAP SERPL CALC-SCNC: 11 MMOL/L — SIGNIFICANT CHANGE UP (ref 7–14)
APPEARANCE UR: CLEAR — SIGNIFICANT CHANGE UP
AST SERPL-CCNC: 27 U/L — SIGNIFICANT CHANGE UP (ref 0–41)
BACTERIA # UR AUTO: NEGATIVE /HPF — SIGNIFICANT CHANGE UP
BASOPHILS # BLD AUTO: 0.03 K/UL — SIGNIFICANT CHANGE UP (ref 0–0.2)
BASOPHILS NFR BLD AUTO: 0.5 % — SIGNIFICANT CHANGE UP (ref 0–1)
BILIRUB SERPL-MCNC: 0.4 MG/DL — SIGNIFICANT CHANGE UP (ref 0.2–1.2)
BILIRUB UR-MCNC: NEGATIVE — SIGNIFICANT CHANGE UP
BUN SERPL-MCNC: 15 MG/DL — SIGNIFICANT CHANGE UP (ref 10–20)
CALCIUM SERPL-MCNC: 9.3 MG/DL — SIGNIFICANT CHANGE UP (ref 8.4–10.5)
CAST: 0 /LPF — SIGNIFICANT CHANGE UP (ref 0–4)
CHLORIDE SERPL-SCNC: 100 MMOL/L — SIGNIFICANT CHANGE UP (ref 98–110)
CO2 SERPL-SCNC: 26 MMOL/L — SIGNIFICANT CHANGE UP (ref 17–32)
COLOR SPEC: YELLOW — SIGNIFICANT CHANGE UP
CREAT SERPL-MCNC: 0.9 MG/DL — SIGNIFICANT CHANGE UP (ref 0.7–1.5)
DIFF PNL FLD: NEGATIVE — SIGNIFICANT CHANGE UP
EGFR: 102 ML/MIN/1.73M2 — SIGNIFICANT CHANGE UP
EGFR: 102 ML/MIN/1.73M2 — SIGNIFICANT CHANGE UP
EOSINOPHIL # BLD AUTO: 0.11 K/UL — SIGNIFICANT CHANGE UP (ref 0–0.7)
EOSINOPHIL NFR BLD AUTO: 1.8 % — SIGNIFICANT CHANGE UP (ref 0–8)
GLUCOSE SERPL-MCNC: 221 MG/DL — HIGH (ref 70–99)
GLUCOSE UR QL: >=1000 MG/DL
HCT VFR BLD CALC: 41.1 % — LOW (ref 42–52)
HGB BLD-MCNC: 14.2 G/DL — SIGNIFICANT CHANGE UP (ref 14–18)
IMM GRANULOCYTES NFR BLD AUTO: 0.3 % — SIGNIFICANT CHANGE UP (ref 0.1–0.3)
KETONES UR-MCNC: NEGATIVE MG/DL — SIGNIFICANT CHANGE UP
LEUKOCYTE ESTERASE UR-ACNC: NEGATIVE — SIGNIFICANT CHANGE UP
LIDOCAIN IGE QN: 29 U/L — SIGNIFICANT CHANGE UP (ref 7–60)
LYMPHOCYTES # BLD AUTO: 2.86 K/UL — SIGNIFICANT CHANGE UP (ref 1.2–3.4)
LYMPHOCYTES # BLD AUTO: 45.9 % — SIGNIFICANT CHANGE UP (ref 20.5–51.1)
MCHC RBC-ENTMCNC: 29 PG — SIGNIFICANT CHANGE UP (ref 27–31)
MCHC RBC-ENTMCNC: 34.5 G/DL — SIGNIFICANT CHANGE UP (ref 32–37)
MCV RBC AUTO: 84 FL — SIGNIFICANT CHANGE UP (ref 80–94)
MONOCYTES # BLD AUTO: 0.49 K/UL — SIGNIFICANT CHANGE UP (ref 0.1–0.6)
MONOCYTES NFR BLD AUTO: 7.9 % — SIGNIFICANT CHANGE UP (ref 1.7–9.3)
NEUTROPHILS # BLD AUTO: 2.72 K/UL — SIGNIFICANT CHANGE UP (ref 1.4–6.5)
NEUTROPHILS NFR BLD AUTO: 43.6 % — SIGNIFICANT CHANGE UP (ref 42.2–75.2)
NITRITE UR-MCNC: NEGATIVE — SIGNIFICANT CHANGE UP
NRBC BLD AUTO-RTO: 0 /100 WBCS — SIGNIFICANT CHANGE UP (ref 0–0)
PH UR: 6.5 — SIGNIFICANT CHANGE UP (ref 5–8)
PLATELET # BLD AUTO: 133 K/UL — SIGNIFICANT CHANGE UP (ref 130–400)
PMV BLD: 8.9 FL — SIGNIFICANT CHANGE UP (ref 7.4–10.4)
POTASSIUM SERPL-MCNC: 4.3 MMOL/L — SIGNIFICANT CHANGE UP (ref 3.5–5)
POTASSIUM SERPL-SCNC: 4.3 MMOL/L — SIGNIFICANT CHANGE UP (ref 3.5–5)
PROT SERPL-MCNC: 7.5 G/DL — SIGNIFICANT CHANGE UP (ref 6–8)
PROT UR-MCNC: 100 MG/DL
RBC # BLD: 4.89 M/UL — SIGNIFICANT CHANGE UP (ref 4.7–6.1)
RBC # FLD: 12.6 % — SIGNIFICANT CHANGE UP (ref 11.5–14.5)
RBC CASTS # UR COMP ASSIST: 2 /HPF — SIGNIFICANT CHANGE UP (ref 0–4)
SODIUM SERPL-SCNC: 137 MMOL/L — SIGNIFICANT CHANGE UP (ref 135–146)
SP GR SPEC: >1.03 — HIGH (ref 1–1.03)
SPERM AB SPEC-ACNC: PRESENT
SQUAMOUS # UR AUTO: 1 /HPF — SIGNIFICANT CHANGE UP (ref 0–5)
UROBILINOGEN FLD QL: 1 MG/DL — SIGNIFICANT CHANGE UP (ref 0.2–1)
WBC # BLD: 6.23 K/UL — SIGNIFICANT CHANGE UP (ref 4.8–10.8)
WBC # FLD AUTO: 6.23 K/UL — SIGNIFICANT CHANGE UP (ref 4.8–10.8)
WBC UR QL: 6 /HPF — HIGH (ref 0–5)

## 2025-04-06 PROCEDURE — 87077 CULTURE AEROBIC IDENTIFY: CPT

## 2025-04-06 PROCEDURE — 80053 COMPREHEN METABOLIC PANEL: CPT

## 2025-04-06 PROCEDURE — 74177 CT ABD & PELVIS W/CONTRAST: CPT | Mod: 26

## 2025-04-06 PROCEDURE — 87186 SC STD MICRODIL/AGAR DIL: CPT

## 2025-04-06 PROCEDURE — 96374 THER/PROPH/DIAG INJ IV PUSH: CPT | Mod: XU

## 2025-04-06 PROCEDURE — 99285 EMERGENCY DEPT VISIT HI MDM: CPT

## 2025-04-06 PROCEDURE — 96361 HYDRATE IV INFUSION ADD-ON: CPT

## 2025-04-06 PROCEDURE — 85025 COMPLETE CBC W/AUTO DIFF WBC: CPT

## 2025-04-06 PROCEDURE — 36415 COLL VENOUS BLD VENIPUNCTURE: CPT

## 2025-04-06 PROCEDURE — 81001 URINALYSIS AUTO W/SCOPE: CPT

## 2025-04-06 PROCEDURE — 87086 URINE CULTURE/COLONY COUNT: CPT

## 2025-04-06 PROCEDURE — 99284 EMERGENCY DEPT VISIT MOD MDM: CPT | Mod: 25

## 2025-04-06 PROCEDURE — 83690 ASSAY OF LIPASE: CPT

## 2025-04-06 PROCEDURE — 74177 CT ABD & PELVIS W/CONTRAST: CPT | Mod: MC

## 2025-04-06 RX ORDER — TIZANIDINE 4 MG/1
2 TABLET ORAL
Qty: 30 | Refills: 0
Start: 2025-04-06 | End: 2025-04-10

## 2025-04-06 RX ORDER — KETOROLAC TROMETHAMINE 30 MG/ML
15 INJECTION, SOLUTION INTRAMUSCULAR; INTRAVENOUS ONCE
Refills: 0 | Status: DISCONTINUED | OUTPATIENT
Start: 2025-04-06 | End: 2025-04-06

## 2025-04-06 RX ORDER — LIDOCAINE HYDROCHLORIDE 20 MG/ML
1 JELLY TOPICAL ONCE
Refills: 0 | Status: COMPLETED | OUTPATIENT
Start: 2025-04-06 | End: 2025-04-06

## 2025-04-06 RX ORDER — LIDOCAINE HYDROCHLORIDE 20 MG/ML
1 JELLY TOPICAL
Qty: 2 | Refills: 0
Start: 2025-04-06 | End: 2025-04-10

## 2025-04-06 RX ORDER — METHOCARBAMOL 500 MG/1
1000 TABLET, FILM COATED ORAL ONCE
Refills: 0 | Status: COMPLETED | OUTPATIENT
Start: 2025-04-06 | End: 2025-04-06

## 2025-04-06 RX ADMIN — KETOROLAC TROMETHAMINE 15 MILLIGRAM(S): 30 INJECTION, SOLUTION INTRAMUSCULAR; INTRAVENOUS at 20:25

## 2025-04-06 RX ADMIN — METHOCARBAMOL 1000 MILLIGRAM(S): 500 TABLET, FILM COATED ORAL at 21:12

## 2025-04-06 RX ADMIN — Medication 1000 MILLILITER(S): at 20:25

## 2025-04-06 RX ADMIN — Medication 1000 MILLILITER(S): at 22:59

## 2025-04-06 RX ADMIN — LIDOCAINE HYDROCHLORIDE 1 PATCH: 20 JELLY TOPICAL at 21:12

## 2025-04-06 NOTE — ED PROVIDER NOTE - PHYSICAL EXAMINATION
Vital Signs: I have reviewed the initial vital signs.  Constitutional: appears stated age, no acute distress  Eyes: Sclera clear, EOMI.  Cardiovascular: S1 and S2, regular rate, regular rhythm, well-perfused extremities, radial pulses equal and 2+, pedal pulses 2+ and equal  Respiratory: unlabored respiratory effort, clear to auscultation bilaterally no wheezing, rales, or rhonchi  Gastrointestinal:  abdomen soft, non-tender, + right cva tenderness to palpation   : external genitalia WNL and without lesions. testicles descended bilaterally. +cremasteric reflexes bilaterally. no hernias on valsalva. testicles non tender to palpation. no discharge. Chaperoned by JANAE Castro  Musculoskeletal: supple neck, no lower extremity edema  Integumentary: warm, dry, no rash  Neurologic: awake, alert, oriented x3, extremities’ motor and sensory functions grossly intact

## 2025-04-06 NOTE — ED PROVIDER NOTE - DIFFERENTIAL DIAGNOSIS
Pancreatitis, hepatic failure, obstructive uropathy, diverticulitis, colitis, abscess, bowel obstruction, bowel perforation. Electrolyte abnormalities, VALENTIN, and GI bleeding. Differential Diagnosis

## 2025-04-06 NOTE — ED PROVIDER NOTE - PATIENT PORTAL LINK FT
You can access the FollowMyHealth Patient Portal offered by [facility] by registering at the following website: [email or web address]. By joining [email or web address]’s FollowMyHealth portal, you will also be able to view your health information using other applications (apps) compatible with our system.

## 2025-04-06 NOTE — ED PROVIDER NOTE - NSFOLLOWUPINSTRUCTIONS_ED_ALL_ED_FT
Follow-up with your PCP in 1-3 days as well as urology and physical therapy - Our Emergency Department Referral Coordinators will be reaching out to you in the next 24-48 hours from 9:00am to 5:00pm to schedule a follow up appointment. Please expect a phone call from the hospital in that time frame. If you do not receive a call or if you have any questions or concerns, you can reach them at   [phone].    Back Pain    Back pain is very common in adults. The cause of back pain is rarely dangerous and the pain often gets better over time. The cause of your back pain may not be known and may include strain of muscles or ligaments, degeneration of the spinal disks, or arthritis. Occasionally the pain may radiate down your leg(s). Over-the-counter medicines to reduce pain and inflammation are often the most helpful. Stretching and remaining active frequently helps the healing process.     SEEK IMMEDIATE MEDICAL CARE IF YOU HAVE ANY OF THE FOLLOWING SYMPTOMS: bowel or bladder control problems, unusual weakness or numbness in your arms or legs, nausea or vomiting, abdominal pain, fever, dizziness/lightheadedness.

## 2025-04-06 NOTE — ED ADULT NURSE NOTE - NSFALLUNIVINTERV_ED_ALL_ED
Bed/Stretcher in lowest position, wheels locked, appropriate side rails in place/Call bell, personal items and telephone in reach/Instruct patient to call for assistance before getting out of bed/chair/stretcher/Non-slip footwear applied when patient is off stretcher/Kinde to call system/Physically safe environment - no spills, clutter or unnecessary equipment/Purposeful proactive rounding/Room/bathroom lighting operational, light cord in reach

## 2025-04-06 NOTE — ED PROVIDER NOTE - OBJECTIVE STATEMENT
54-year-old male with past medical history HTN, HLD, HIV (follows with Dr. River), CABG x 4 presents with complaints of back pain.  Patient reports he had some mild back pain last week which resolved on its own.  States this morning at 9 AM he bent down to pick something up and felt a pain to the right lower back that has been constant since then.  States the pain radiates from the right lower back to the right groin.  States he had 1 self-limited episode of hematuria last week which resolved on its own.  Denies fever/chills, chest pain, shortness of breath, abdominal pain, nausea/vomiting/diarrhea, change in bowel/bladder habits, dysuria.

## 2025-04-06 NOTE — ED PROVIDER NOTE - ATTENDING APP SHARED VISIT CONTRIBUTION OF CARE
I have personally performed a history and physical exam on this patient. I have personally directed the management of the patient.  Patient stated that, he had blood in the urine last week, and now has lower back pain, no trauma.   Back pain is better at rest and worse with movement. Denies lower ext pain/paresthesias/numbness/weakness. Denies any changes in bladder/bowel habits. No rash.   Vitals reviewed.   Patient is awake, alert, answering questions appropriately, appears comfortable and not in any distress.  Lungs: CTA, no wheezing, no crackles.  Abd: +BS, NT, ND, soft, no rebound, no guarding. No CVA tenderness, no rash.  Back: No swelling, no redness, no midline vertebral column tenderness, no saddle anasthesia, distally NVI.  Ext: B/L lower ext appears symmetrical, no swelling, no redness, no crepitus, no pain on ROM of the joints, no deformity, and are distally NVI.  CNS: awake, alert, o x 3, no focal neurologic deficits.  A/P: Lower back pain, with h/o hematuria,   Labs, CT, symptomatic treatment,   reevaluation.

## 2025-04-09 LAB
-  AMPICILLIN: SIGNIFICANT CHANGE UP
-  CIPROFLOXACIN: SIGNIFICANT CHANGE UP
-  LEVOFLOXACIN: SIGNIFICANT CHANGE UP
-  NITROFURANTOIN: SIGNIFICANT CHANGE UP
-  TETRACYCLINE: SIGNIFICANT CHANGE UP
-  VANCOMYCIN: SIGNIFICANT CHANGE UP
CULTURE RESULTS: ABNORMAL
METHOD TYPE: SIGNIFICANT CHANGE UP
ORGANISM # SPEC MICROSCOPIC CNT: ABNORMAL
ORGANISM # SPEC MICROSCOPIC CNT: SIGNIFICANT CHANGE UP
SPECIMEN SOURCE: SIGNIFICANT CHANGE UP

## 2025-04-09 RX ORDER — NITROFURANTOIN MACROCRYSTAL 100 MG
1 CAPSULE ORAL
Refills: 0
Start: 2025-04-09

## 2025-04-09 RX ORDER — NITROFURANTOIN MACROCRYSTAL 100 MG
1 CAPSULE ORAL
Qty: 14 | Refills: 0
Start: 2025-04-09 | End: 2025-04-15

## 2025-04-09 NOTE — CHART NOTE - NSCHARTNOTEFT_GEN_A_CORE
[facility] MRN [id number] / urology & PT / Left message 4/7 - JL / Pt wants both urology & PT 4/8 - JL / Sent to [facility] 4/8 - JL / Appointment made - [facility]     SPECIALTY: physical therapy  DATE: April 9, 2025

## 2025-04-24 ENCOUNTER — APPOINTMENT (OUTPATIENT)
Dept: UROLOGY | Facility: CLINIC | Age: 55
End: 2025-04-24
Payer: COMMERCIAL

## 2025-04-24 DIAGNOSIS — I25.10 ATHEROSCLEROTIC HEART DISEASE OF NATIVE CORONARY ARTERY W/OUT ANGINA PECTORIS: ICD-10-CM

## 2025-04-24 DIAGNOSIS — E11.9 TYPE 2 DIABETES MELLITUS W/OUT COMPLICATIONS: ICD-10-CM

## 2025-04-24 DIAGNOSIS — Z95.1 PRESENCE OF AORTOCORONARY BYPASS GRAFT: ICD-10-CM

## 2025-04-24 DIAGNOSIS — R31.0 GROSS HEMATURIA: ICD-10-CM

## 2025-04-24 PROCEDURE — G2211 COMPLEX E/M VISIT ADD ON: CPT | Mod: NC

## 2025-04-24 PROCEDURE — 99204 OFFICE O/P NEW MOD 45 MIN: CPT

## 2025-04-28 ENCOUNTER — RX RENEWAL (OUTPATIENT)
Age: 55
End: 2025-04-28

## 2025-04-28 ENCOUNTER — TRANSCRIPTION ENCOUNTER (OUTPATIENT)
Age: 55
End: 2025-04-28

## 2025-05-08 NOTE — PRE-ANESTHESIA EVALUATION ADULT - NSANTHAPLANRD_GEN_ALL_CORE
Update obtained from PCC the patient discharged 5/7/25 to [facility]. This Admin Coordinator will continue to monitor via chart review.      general

## 2025-05-20 ENCOUNTER — RESULT CHARGE (OUTPATIENT)
Age: 55
End: 2025-05-20

## 2025-05-20 ENCOUNTER — APPOINTMENT (OUTPATIENT)
Dept: CARDIOLOGY | Facility: CLINIC | Age: 55
End: 2025-05-20
Payer: COMMERCIAL

## 2025-05-20 VITALS
HEART RATE: 80 BPM | DIASTOLIC BLOOD PRESSURE: 60 MMHG | WEIGHT: 190 LBS | HEIGHT: 69 IN | BODY MASS INDEX: 28.14 KG/M2 | SYSTOLIC BLOOD PRESSURE: 102 MMHG

## 2025-05-20 DIAGNOSIS — I25.10 ATHEROSCLEROTIC HEART DISEASE OF NATIVE CORONARY ARTERY W/OUT ANGINA PECTORIS: ICD-10-CM

## 2025-05-20 DIAGNOSIS — Z95.1 PRESENCE OF AORTOCORONARY BYPASS GRAFT: ICD-10-CM

## 2025-05-20 PROCEDURE — 99214 OFFICE O/P EST MOD 30 MIN: CPT

## 2025-05-20 PROCEDURE — 93000 ELECTROCARDIOGRAM COMPLETE: CPT

## 2025-05-20 PROCEDURE — G2211 COMPLEX E/M VISIT ADD ON: CPT | Mod: NC

## 2025-05-27 ENCOUNTER — APPOINTMENT (OUTPATIENT)
Dept: ENDOCRINOLOGY | Facility: CLINIC | Age: 55
End: 2025-05-27
Payer: COMMERCIAL

## 2025-05-27 ENCOUNTER — OUTPATIENT (OUTPATIENT)
Dept: OUTPATIENT SERVICES | Facility: HOSPITAL | Age: 55
LOS: 1 days | End: 2025-05-27
Payer: COMMERCIAL

## 2025-05-27 VITALS
TEMPERATURE: 98 F | OXYGEN SATURATION: 98 % | HEART RATE: 86 BPM | RESPIRATION RATE: 18 BRPM | DIASTOLIC BLOOD PRESSURE: 71 MMHG | HEIGHT: 68 IN | SYSTOLIC BLOOD PRESSURE: 105 MMHG | WEIGHT: 195.11 LBS

## 2025-05-27 VITALS
WEIGHT: 195 LBS | BODY MASS INDEX: 28.8 KG/M2 | OXYGEN SATURATION: 95 % | SYSTOLIC BLOOD PRESSURE: 100 MMHG | HEART RATE: 89 BPM | DIASTOLIC BLOOD PRESSURE: 64 MMHG

## 2025-05-27 DIAGNOSIS — E78.00 PURE HYPERCHOLESTEROLEMIA, UNSPECIFIED: ICD-10-CM

## 2025-05-27 DIAGNOSIS — M75.101 UNSPECIFIED ROTATOR CUFF TEAR OR RUPTURE OF RIGHT SHOULDER, NOT SPECIFIED AS TRAUMATIC: ICD-10-CM

## 2025-05-27 DIAGNOSIS — Z90.49 ACQUIRED ABSENCE OF OTHER SPECIFIED PARTS OF DIGESTIVE TRACT: Chronic | ICD-10-CM

## 2025-05-27 DIAGNOSIS — Z95.1 PRESENCE OF AORTOCORONARY BYPASS GRAFT: Chronic | ICD-10-CM

## 2025-05-27 DIAGNOSIS — E11.9 TYPE 2 DIABETES MELLITUS W/OUT COMPLICATIONS: ICD-10-CM

## 2025-05-27 DIAGNOSIS — Z01.818 ENCOUNTER FOR OTHER PREPROCEDURAL EXAMINATION: ICD-10-CM

## 2025-05-27 LAB
A1C WITH ESTIMATED AVERAGE GLUCOSE RESULT: 8.4 % — HIGH (ref 4–5.6)
ESTIMATED AVERAGE GLUCOSE: 194 MG/DL — HIGH (ref 68–114)

## 2025-05-27 PROCEDURE — 83036 HEMOGLOBIN GLYCOSYLATED A1C: CPT

## 2025-05-27 PROCEDURE — 99213 OFFICE O/P EST LOW 20 MIN: CPT

## 2025-05-27 PROCEDURE — 99214 OFFICE O/P EST MOD 30 MIN: CPT | Mod: 25

## 2025-05-27 PROCEDURE — 36415 COLL VENOUS BLD VENIPUNCTURE: CPT

## 2025-05-27 RX ORDER — ROSUVASTATIN CALCIUM 20 MG/1
1 TABLET, FILM COATED ORAL
Refills: 0 | DISCHARGE

## 2025-05-27 RX ORDER — TIRZEPATIDE 7.5 MG/.5ML
12.5 INJECTION, SOLUTION SUBCUTANEOUS
Refills: 0 | DISCHARGE

## 2025-05-27 RX ORDER — ICOSAPENT ETHYL 500 MG/1
2 CAPSULE ORAL
Refills: 0 | DISCHARGE

## 2025-05-27 RX ORDER — METOPROLOL SUCCINATE 50 MG/1
1 TABLET, EXTENDED RELEASE ORAL
Refills: 0 | DISCHARGE

## 2025-05-27 RX ORDER — GLIMEPIRIDE 4 MG/1
1 TABLET ORAL
Refills: 0 | DISCHARGE

## 2025-05-27 RX ORDER — METFORMIN HYDROCHLORIDE 850 MG/1
1 TABLET ORAL
Refills: 0 | DISCHARGE

## 2025-05-27 RX ORDER — GLIMEPIRIDE 4 MG/1
4 TABLET ORAL
Qty: 30 | Refills: 6 | Status: ACTIVE | COMMUNITY
Start: 2025-05-27 | End: 1900-01-01

## 2025-05-27 RX ORDER — LOSARTAN POTASSIUM 100 MG/1
100 TABLET, FILM COATED ORAL
Refills: 0 | DISCHARGE

## 2025-05-27 NOTE — H&P PST ADULT - HISTORY OF PRESENT ILLNESS
54 year old male presents for RIGHT SHOULDER ARTHROSCOPY, ROTATOR CUFF REPAIR, DECOMPRESSION, DISTAL CLAVICLE EXCISION, OPEN BICEPS TENODESIS, POSSIBLE ALLOGRAFT AUGMENTATION on 6/4/25 with Dr. Russell under general anesthesia     Patient is here c/o right shoulder pain. Pt states affecting quality of life. Pt has pain and weakness with loss of rom. Pain wakes up at night due to pain. MRI of the right shoulder significant for full-thickness tear of the supraspinatus with 4 cm retraction, bursitis, partial subscap tear, AC arthritis, biceps tenosynovitis and subluxation medially, labral tear.       PATIENT / GUARDIAN CURRENTLY DENIES CHEST PAIN  SHORTNESS OF BREATH  PALPITATIONS,  DYSURIA, OR UPPER RESPIRATORY INFECTION IN PAST 2 WEEKS  Patient / Guardian verbalized understanding of instructions and was given the opportunity to ask questions and have them answered.  As per patient, this is their complete medical and surgical history, including medications both prescribed or over the counter.  written and verbal instructions with teach back on chlorhexidine shampoo provided,  pt verbalized understanding with returned demonstration    Anesthesia Alert  NO--Difficult Airway  NO--History of neck surgery or radiation  NO--Limited ROM of neck  NO--History of Malignant hyperthermia  NO--Personal or family history of Pseudocholinesterase deficiency  NO--Prior Anesthesia Complication  NO--Latex Allergy  NO--Loose teeth  NO--History of Rheumatoid Arthritis  YES--KALYANI- Compliant with CPAP   YES--Bleeding risk- plavix   NO--Other_____  Mallampati airway: Class III     Revised Cardiac Risk Index for Pre-Operative Risk from [facility]  on 5/27/2025  ** All calculations should be rechecked by clinician prior to use **    RESULT SUMMARY:  2 points  RCRI Score    10.1 %  Risk of major cardiac event      INPUTS:  High-risk surgery —> 0 = No  History of ischemic heart disease —> 1 = Yes  History of congestive heart failure —> 1 = Yes  History of cerebrovascular disease —> 0 = No  Pre-operative treatment with insulin —> 0 = No  Pre-operative creatinine >2 mg/dL / 176.8 µmol/L —> 0 = No    [facility] Activity Status Index (DASI) from [facility]  on 5/27/2025  ** All calculations should be rechecked by clinician prior to use **    RESULT SUMMARY:  58.2 points  The higher the score (maximum 58.2), the higher the functional status.    9.89 METs        INPUTS:  Take care of self —> 2.75 = Yes  Walk indoors —> 1.75 = Yes  Walk 1&ndash;2 blocks on level ground —> 2.75 = Yes  Climb a flight of stairs or walk up a hill —> 5.5 = Yes  Run a short distance —> 8 = Yes  Do light work around the house —> 2.7 = Yes  Do moderate work around the house —> 3.5 = Yes  Do heavy work around the house —> 8 = Yes  Do yardwork —> 4.5 = Yes  Have sexual relations —> 5.25 = Yes  Participate in moderate recreational activities —> 6 = Yes  Participate in strenuous sports —> 7.5 = Yes

## 2025-05-27 NOTE — H&P PST ADULT - REASON FOR ADMISSION
54 year old male presents for RIGHT SHOULDER ARTHROSCOPY, ROTATOR CUFF REPAIR, DECOMPRESSION, DISTAL CLAVICLE EXCISION, OPEN BICEPS TENODESIS, POSSIBLE ALLOGRAFT AUGMENTATION on 6/4/25 with Dr. Russell under general anesthesia

## 2025-05-28 DIAGNOSIS — Z01.818 ENCOUNTER FOR OTHER PREPROCEDURAL EXAMINATION: ICD-10-CM

## 2025-05-28 DIAGNOSIS — M75.101 UNSPECIFIED ROTATOR CUFF TEAR OR RUPTURE OF RIGHT SHOULDER, NOT SPECIFIED AS TRAUMATIC: ICD-10-CM

## 2025-06-02 ENCOUNTER — APPOINTMENT (OUTPATIENT)
Dept: UROLOGY | Facility: CLINIC | Age: 55
End: 2025-06-02

## 2025-06-04 ENCOUNTER — APPOINTMENT (OUTPATIENT)
Dept: ORTHOPEDIC SURGERY | Facility: AMBULATORY SURGERY CENTER | Age: 55
End: 2025-06-04

## 2025-06-09 ENCOUNTER — RX RENEWAL (OUTPATIENT)
Age: 55
End: 2025-06-09

## 2025-06-12 ENCOUNTER — APPOINTMENT (OUTPATIENT)
Dept: ORTHOPEDIC SURGERY | Facility: CLINIC | Age: 55
End: 2025-06-12

## 2025-07-11 ENCOUNTER — TRANSCRIPTION ENCOUNTER (OUTPATIENT)
Age: 55
End: 2025-07-11

## 2025-07-23 ENCOUNTER — RX RENEWAL (OUTPATIENT)
Age: 55
End: 2025-07-23

## 2025-08-19 ENCOUNTER — APPOINTMENT (OUTPATIENT)
Dept: CARDIOLOGY | Facility: CLINIC | Age: 55
End: 2025-08-19

## 2025-08-26 ENCOUNTER — APPOINTMENT (OUTPATIENT)
Dept: ENDOCRINOLOGY | Facility: CLINIC | Age: 55
End: 2025-08-26
Payer: COMMERCIAL

## 2025-08-26 VITALS
HEIGHT: 69 IN | OXYGEN SATURATION: 98 % | RESPIRATION RATE: 18 BRPM | DIASTOLIC BLOOD PRESSURE: 70 MMHG | HEART RATE: 85 BPM | BODY MASS INDEX: 28.88 KG/M2 | WEIGHT: 195 LBS | SYSTOLIC BLOOD PRESSURE: 118 MMHG

## 2025-08-26 DIAGNOSIS — K59.00 CONSTIPATION, UNSPECIFIED: ICD-10-CM

## 2025-08-26 DIAGNOSIS — I25.10 ATHEROSCLEROTIC HEART DISEASE OF NATIVE CORONARY ARTERY W/OUT ANGINA PECTORIS: ICD-10-CM

## 2025-08-26 DIAGNOSIS — E11.9 TYPE 2 DIABETES MELLITUS W/OUT COMPLICATIONS: ICD-10-CM

## 2025-08-26 DIAGNOSIS — E66.3 OVERWEIGHT: ICD-10-CM

## 2025-08-26 DIAGNOSIS — E78.00 PURE HYPERCHOLESTEROLEMIA, UNSPECIFIED: ICD-10-CM

## 2025-08-26 PROCEDURE — 99204 OFFICE O/P NEW MOD 45 MIN: CPT

## 2025-08-26 RX ORDER — LINACLOTIDE 72 UG/1
72 CAPSULE, GELATIN COATED ORAL DAILY
Qty: 30 | Refills: 6 | Status: ACTIVE | COMMUNITY
Start: 2025-08-26 | End: 1900-01-01